# Patient Record
Sex: FEMALE | Race: BLACK OR AFRICAN AMERICAN | NOT HISPANIC OR LATINO | Employment: FULL TIME | URBAN - METROPOLITAN AREA
[De-identification: names, ages, dates, MRNs, and addresses within clinical notes are randomized per-mention and may not be internally consistent; named-entity substitution may affect disease eponyms.]

---

## 2018-04-16 ENCOUNTER — OFFICE VISIT (OUTPATIENT)
Dept: ALLERGY | Facility: CLINIC | Age: 43
End: 2018-04-16
Payer: COMMERCIAL

## 2018-04-16 VITALS
HEIGHT: 64 IN | BODY MASS INDEX: 29.5 KG/M2 | DIASTOLIC BLOOD PRESSURE: 74 MMHG | SYSTOLIC BLOOD PRESSURE: 110 MMHG | WEIGHT: 172.81 LBS

## 2018-04-16 DIAGNOSIS — J31.0 RHINITIS, UNSPECIFIED CHRONICITY, UNSPECIFIED TYPE: Primary | ICD-10-CM

## 2018-04-16 DIAGNOSIS — Z91.018 FOOD ALLERGY: ICD-10-CM

## 2018-04-16 PROCEDURE — 99244 OFF/OP CNSLTJ NEW/EST MOD 40: CPT | Mod: 25,S$GLB,, | Performed by: STUDENT IN AN ORGANIZED HEALTH CARE EDUCATION/TRAINING PROGRAM

## 2018-04-16 PROCEDURE — 99999 PR PBB SHADOW E&M-NEW PATIENT-LVL III: CPT | Mod: PBBFAC,,, | Performed by: STUDENT IN AN ORGANIZED HEALTH CARE EDUCATION/TRAINING PROGRAM

## 2018-04-16 PROCEDURE — 95004 PERQ TESTS W/ALRGNC XTRCS: CPT | Mod: S$GLB,,, | Performed by: STUDENT IN AN ORGANIZED HEALTH CARE EDUCATION/TRAINING PROGRAM

## 2018-04-16 RX ORDER — EPINEPHRINE 0.3 MG/.3ML
1 INJECTION SUBCUTANEOUS ONCE AS NEEDED
Qty: 1 ML | Refills: 0 | Status: SHIPPED | OUTPATIENT
Start: 2018-04-16 | End: 2018-05-10

## 2018-04-16 RX ORDER — AZELASTINE 1 MG/ML
2 SPRAY, METERED NASAL 2 TIMES DAILY PRN
Qty: 30 ML | Refills: 11 | Status: SHIPPED | OUTPATIENT
Start: 2018-04-16 | End: 2023-07-17

## 2018-04-16 NOTE — LETTER
April 16, 2018      Bernarda Alvarado, MICAH  [address]  [facility] [address]           [facility] - Allergy/ Immunology  [address]  [facility] [address]  Phone: [phone]  Fax: [phone]          Patient: Sara Franco   MR Number: [id number]   YOB: 1975   Date of Visit: 4/16/2018       Dear Bernarda Alvarado:    Thank you for referring Sara Franco to me for evaluation. Attached you will find relevant portions of my assessment and plan of care.    If you have questions, please do not hesitate to call me. I look forward to following Sara Franco along with you.    Sincerely,    Idania Philip MD    Enclosure  CC:  No Recipients    If you would like to receive this communication electronically, please contact [email or web address] or [phone] to request more information on [employer] Link access.    For providers and/or their staff who would like to refer a patient to [facility], please contact us through our one-stop-shop provider referral line, [facility], at [phone].    If you feel you have received this communication in error or would no longer like to receive these types of communications, please e-mail [email or web address]

## 2018-04-16 NOTE — PATIENT INSTRUCTIONS
Testing  Blood work for tilapia, catfish and a few other allergens today       Check portal in one week for results       Contact me with questions or concerns          Food allergies  Highly allergic to fish  Also showed a positive test to oyster  And positive to Hazelnut. -- which is probably a cross reaction from a pollen    Continue to avoid all fish, including canned tuna in oil.  Recommend an EpiPen    OK to eat hazelnut flavored coffees and candies.  Avoid eating tree nuts for now.          Airborne allergies  Recommend Astelin as needed before or during symptoms  2 squirts each nostril, up to twice a day        Do not snort (because it tastes bad)      Recommend these dust avoidance measures:  No stuffed animals on the bed at night.  No feathers or down on the bed  Dust proof covers on all pillows that stay on the bed at night.  Take a pillow cover (or your pillow) for vacations  Dust proof cover on mattress.    Recommend for pollen allergy  Do not mow grass  Take Zyrtec and/or Benadryl before outdoor activities    Return in 1-3 weeks  Call sooner if questions or problems

## 2018-05-08 NOTE — PROGRESS NOTES
Allergy Clinic Note  [facility]    Subjective:      Patient ID: Sara Franco is a 43 y.o. female.    Chief Complaint: Follow-up    Allergy problem list  Perennial AR:  Grass, dust > others  Fish allergy m/b mouth itching  Itching +/- rash  Positive skin tests and Immunocap to tree nuts and peanut    History of Present Illness: 43-year-old female with allergic rhinitis (grass, dust, ? others) and fish ALLERGY returns to follow-up symptoms, to follow-up test results, and to devise a longterm treatment plan.    Skin testing at last visit was complicated by overlapping flares, hence we decided to check serum specific IgE results for deciding on a treatment plan.    At last visit, I added Astelin (2 SEN BID), instructed her to avoid all fin fish, and prescribed an EpiPen. I also instructed her on dust and grass avoidance measures.    She reports improvement in all symptoms, particularly decreased runny nose.  The Zyrtec does cause drowsiness so she has altered the scheduled to 1 tablet every other night.  No adverse reactions to the Astelin.  Overall she is satisfied with her symptom level of control, but is interested in exploring options for sublingual immunotherapy to dust mite.    She was unable to fill EpiPen secondary to cost    No new problems or complaints    Additional History:   She is a lifetime nonsmoker and has no exposure to passive smoke. Past medical, family, and social histories are unchanged.    There is no problem list on file for this patient.    Current Outpatient Prescriptions on File Prior to Visit   Medication Sig Dispense Refill    azelastine (ASTELIN) 137 mcg (0.1 %) nasal spray 2 sprays (274 mcg total) by Nasal route 2 (two) times daily as needed for Rhinitis. Donot snort (because it tastes bad) 30 mL 11    [DISCONTINUED] EPINEPHrine (EPIPEN) 0.3 mg/0.3 mL AtIn Inject 0.3 mLs (0.3 mg total) into the muscle once as needed. 1 mL 0     No current facility-administered medications  "on file prior to visit.          Review of Systems   Constitutional: Negative for chills and fever.   HENT: Negative for ear discharge and nosebleeds.    Eyes: Negative for discharge and redness.   Respiratory: Negative for hemoptysis, sputum production and stridor.    Gastrointestinal: Negative for blood in stool, melena and vomiting.   Genitourinary: Negative for hematuria.   Skin: Negative for itching and rash.   Neurological: Negative for seizures and loss of consciousness.       Objective:   /76 (BP Location: Left arm, Patient Position: Sitting)   Ht 5' 4" (1.626 m)   Wt 78.4 kg (172 lb 13.5 oz)   BMI 29.67 kg/m²       Physical Exam   Constitutional: She is oriented to person, place, and time and well-developed, well-nourished, and in no distress.   HENT:   Head: Normocephalic and atraumatic.   Nose: Nose normal.   Mouth/Throat: No oropharyngeal exudate.   Nares pink with mild to moderate turbinate swelling.  No erosions   Eyes: Conjunctivae are normal. No scleral icterus.   Neck: Neck supple.   Cardiovascular: Normal rate, regular rhythm and intact distal pulses.    Pulmonary/Chest: Effort normal. No stridor. No respiratory distress.   Abdominal: She exhibits no distension.   Musculoskeletal: She exhibits no edema or deformity.   Neurological: She is alert and oriented to person, place, and time.   Skin: No rash noted. No erythema.   Psychiatric: Affect and judgment normal.       Data: aeroallergen testing by the serum Immunocap method  Class VI  Mahendra grass  Class V   Bermuda grass  Class IV   dust mite (DF)  Class III    dust mite (DP), English plantain A, oak, marsh elder, ragweed  Class II Cedar, pecan pollen, cockroach, Aspergillus mold  Class I dog  Class 0 cat, Alternaria mold  Total serum IgE 1475    Selected food aeroallergen testing by the serum Immunocap method  Class IV  catfish, codfish, tilapia, trout  Class III   peanut, tuna, salmon, flounder  Class II    hazelnut, almond, [place] " nut, cashew, walnut, oyster  Class 0   pecan  Assessment:     1. Seasonal allergic rhinitis due to pollen    2. Allergic rhinitis due to house dust mite    3. Elevated IgE level    4. Fish allergy    5. Abnormal laboratory test result        Plan:     Diagnoses and all orders for this visit:    Seasonal allergic rhinitis due to pollen - improved  Continue current meds    Allergic rhinitis due to house dust mite - improved  Request placed for Odactra  Request placed for AUVI Q    Elevated IgE level - noted    Fish allergy - confirmed by skin testing and serum  Avoid all fish except canned tuna in water    Abnormal laboratory test result  Low level reactivity to nuts appears to be false positive  This is not surprising given her total IgE of over 1400  No restrictions on tree nuts or peanuts        Patient Instructions   Avoid all fin fish        except canned tuna in water    No other food restrictions    I submitted a request for epinephrine injector.  Expect a call from an 274 area code.    I also submitted a request for the dust tablets taken under the tongue.  If approved, we can discuss further.  First dose would be given here.          Follow-up X if symptoms worsen or fail to improve,   or first Odactra dose.  or in about a year      Idania Philip MD

## 2018-05-10 ENCOUNTER — OFFICE VISIT (OUTPATIENT)
Dept: ALLERGY | Facility: CLINIC | Age: 43
End: 2018-05-10
Payer: COMMERCIAL

## 2018-05-10 VITALS
BODY MASS INDEX: 29.5 KG/M2 | DIASTOLIC BLOOD PRESSURE: 76 MMHG | HEIGHT: 64 IN | SYSTOLIC BLOOD PRESSURE: 118 MMHG | WEIGHT: 172.81 LBS

## 2018-05-10 DIAGNOSIS — R89.9 ABNORMAL LABORATORY TEST RESULT: ICD-10-CM

## 2018-05-10 DIAGNOSIS — J30.89 ALLERGIC RHINITIS DUE TO HOUSE DUST MITE: ICD-10-CM

## 2018-05-10 DIAGNOSIS — J30.1 SEASONAL ALLERGIC RHINITIS DUE TO POLLEN: Primary | ICD-10-CM

## 2018-05-10 DIAGNOSIS — R76.8 ELEVATED IGE LEVEL: ICD-10-CM

## 2018-05-10 DIAGNOSIS — Z91.013 FISH ALLERGY: ICD-10-CM

## 2018-05-10 PROCEDURE — 3008F BODY MASS INDEX DOCD: CPT | Mod: CPTII,S$GLB,, | Performed by: STUDENT IN AN ORGANIZED HEALTH CARE EDUCATION/TRAINING PROGRAM

## 2018-05-10 PROCEDURE — 99999 PR PBB SHADOW E&M-EST. PATIENT-LVL III: CPT | Mod: PBBFAC,,, | Performed by: STUDENT IN AN ORGANIZED HEALTH CARE EDUCATION/TRAINING PROGRAM

## 2018-05-10 PROCEDURE — 99213 OFFICE O/P EST LOW 20 MIN: CPT | Mod: S$GLB,,, | Performed by: STUDENT IN AN ORGANIZED HEALTH CARE EDUCATION/TRAINING PROGRAM

## 2018-05-10 RX ORDER — BIOTIN 1 MG
1000 TABLET ORAL 3 TIMES DAILY
COMMUNITY
End: 2023-12-04 | Stop reason: CLARIF

## 2018-05-10 RX ORDER — CETIRIZINE HYDROCHLORIDE 10 MG/1
10 TABLET ORAL DAILY
COMMUNITY

## 2018-05-10 RX ORDER — EPINEPHRINE 0.3 MG/.3ML
1 INJECTION SUBCUTANEOUS ONCE
Qty: 0.3 ML | Refills: 3 | Status: SHIPPED | OUTPATIENT
Start: 2018-05-10 | End: 2018-05-10

## 2018-05-10 NOTE — PATIENT INSTRUCTIONS
Avoid all fin fish        except canned tuna in water    No other food restrictions    I submitted a request for epinephrine injector.  Expect a call from an [id number] area code.    I also submitted a request for the dust tablets taken under the tongue.  If approved, we can discuss further.  First dose would be given here.

## 2019-07-18 ENCOUNTER — HOSPITAL ENCOUNTER (OUTPATIENT)
Dept: RADIOLOGY | Facility: OTHER | Age: 44
Discharge: HOME | End: 2019-07-18
Attending: NURSE PRACTITIONER
Payer: COMMERCIAL

## 2019-07-18 DIAGNOSIS — R10.2 PELVIC AND PERINEAL PAIN: ICD-10-CM

## 2019-07-18 PROCEDURE — 76830 TRANSVAGINAL US NON-OB: CPT | Mod: 26,,, | Performed by: RADIOLOGY

## 2019-07-18 PROCEDURE — 76856 US EXAM PELVIC COMPLETE: CPT | Mod: 26,,, | Performed by: RADIOLOGY

## 2019-07-18 PROCEDURE — 76830 TRANSVAGINAL US NON-OB: CPT | Mod: TC

## 2019-07-18 PROCEDURE — 76830 US PELVIS COMP WITH TRANSVAG NON-OB (XPD): ICD-10-PCS | Mod: 26,,, | Performed by: RADIOLOGY

## 2019-07-18 PROCEDURE — 76856 US PELVIS COMP WITH TRANSVAG NON-OB (XPD): ICD-10-PCS | Mod: 26,,, | Performed by: RADIOLOGY

## 2019-08-05 ENCOUNTER — HOSPITAL ENCOUNTER (OUTPATIENT)
Dept: RADIOLOGY | Facility: HOSPITAL | Age: 44
Discharge: HOME | End: 2019-08-05
Attending: NURSE PRACTITIONER
Payer: COMMERCIAL

## 2019-08-05 VITALS — BODY MASS INDEX: 29.52 KG/M2 | WEIGHT: 172 LBS

## 2019-08-05 DIAGNOSIS — Z12.31 ENCOUNTER FOR SCREENING MAMMOGRAM FOR MALIGNANT NEOPLASM OF BREAST: ICD-10-CM

## 2019-08-05 PROCEDURE — 77067 SCR MAMMO BI INCL CAD: CPT | Mod: TC

## 2019-08-05 PROCEDURE — 77067 SCR MAMMO BI INCL CAD: CPT | Mod: 26,,, | Performed by: RADIOLOGY

## 2019-08-05 PROCEDURE — 77067 MAMMO DIGITAL SCREENING BILAT WITH CAD: ICD-10-PCS | Mod: 26,,, | Performed by: RADIOLOGY

## 2019-08-29 ENCOUNTER — OFFICE VISIT (OUTPATIENT)
Dept: OBSTETRICS AND GYNECOLOGY | Facility: CLINIC | Age: 44
End: 2019-08-29
Payer: COMMERCIAL

## 2019-08-29 VITALS
BODY MASS INDEX: 28.81 KG/M2 | DIASTOLIC BLOOD PRESSURE: 66 MMHG | WEIGHT: 168.75 LBS | HEIGHT: 64 IN | SYSTOLIC BLOOD PRESSURE: 102 MMHG

## 2019-08-29 DIAGNOSIS — D21.9 LEIOMYOMA: Primary | ICD-10-CM

## 2019-08-29 PROCEDURE — 3008F BODY MASS INDEX DOCD: CPT | Mod: CPTII,S$GLB,, | Performed by: OBSTETRICS & GYNECOLOGY

## 2019-08-29 PROCEDURE — 3008F PR BODY MASS INDEX (BMI) DOCUMENTED: ICD-10-PCS | Mod: CPTII,S$GLB,, | Performed by: OBSTETRICS & GYNECOLOGY

## 2019-08-29 PROCEDURE — 99203 PR OFFICE/OUTPT VISIT, NEW, LEVL III, 30-44 MIN: ICD-10-PCS | Mod: S$GLB,,, | Performed by: OBSTETRICS & GYNECOLOGY

## 2019-08-29 PROCEDURE — 99999 PR PBB SHADOW E&M-EST. PATIENT-LVL III: ICD-10-PCS | Mod: PBBFAC,,, | Performed by: OBSTETRICS & GYNECOLOGY

## 2019-08-29 PROCEDURE — 99999 PR PBB SHADOW E&M-EST. PATIENT-LVL III: CPT | Mod: PBBFAC,,, | Performed by: OBSTETRICS & GYNECOLOGY

## 2019-08-29 PROCEDURE — 99203 OFFICE O/P NEW LOW 30 MIN: CPT | Mod: S$GLB,,, | Performed by: OBSTETRICS & GYNECOLOGY

## 2019-08-29 NOTE — PROGRESS NOTES
History & Physical  Gynecology      SUBJECTIVE:     Chief Complaint: Ovarian Cyst and Fibroids       History of Present Illness:  Ms. Franco is a 44 yr old female who presents for pelvic and perineal pain. She underwent an ultrasound which showed multiple small fibroids (largest 1.8cm) and a benign left adnexal cyst. Today she denies constant pelvic pain. She reports regular cycles with 5 days of bleeding and cramping. Currently sexually active with pain with different positions but overall tolerable. No other complaints.     FINDINGS:  Uterus:    Size: 9.3 x 5.1 x 5.2 cm    Masses: 3 fibroids are identified.  There is a 1.5 cm fibroid in an anterior subserosal location.  1.8 cm fibroid in the posterior uterus, intramural.  8 mm fibroid in the posterior subserosal uterus.  Endometrium: Normal in this pre menopausal patient, measuring 5 mm.  Right ovary:  Size: 2.3 x 1.5 x 2.2 cm  Appearance: Normal  Vascular flow: Normal.  Left ovary:  Size: 4.1 x 2.3 x 4.0 cm  Appearance: Benign appearing cyst.  Vascular Flow: Normal.  Free Fluid:  None.    Review of patient's allergies indicates:  No Known Allergies    Past Medical History:   Diagnosis Date    Allergy      Past Surgical History:   Procedure Laterality Date    TUBAL LIGATION       OB History        3    Para   3    Term   3            AB        Living           SAB        TAB        Ectopic        Multiple        Live Births                   Family History   Problem Relation Age of Onset    Eczema Daughter     Allergic rhinitis Daughter     Breast cancer Maternal Aunt     Colon cancer Neg Hx     Ovarian cancer Neg Hx      Social History     Tobacco Use    Smoking status: Never Smoker    Smokeless tobacco: Never Used   Substance Use Topics    Alcohol use: Yes     Comment: socially     Drug use: No       Current Outpatient Medications   Medication Sig    allerg xt,D.farinae-D.pteronys (ODACTRA) 12 SQ-HDM Subl Place 1 tablet under the tongue  once daily.    azelastine (ASTELIN) 137 mcg (0.1 %) nasal spray 2 sprays (274 mcg total) by Nasal route 2 (two) times daily as needed for Rhinitis. Donot snort (because it tastes bad)    biotin 1 mg tablet Take 1,000 mcg by mouth 3 (three) times daily.    cetirizine (ZYRTEC) 10 MG tablet Take 10 mg by mouth once daily.    EPINEPHrine (AUVI-Q) 0.3 mg/0.3 mL AtIn Inject 0.3 mLs (0.3 mg total) into the muscle once.    etodolac (LODINE) 400 MG tablet Take 1 tablet (400 mg total) by mouth 2 (two) times daily as needed (Pain). Take with food     No current facility-administered medications for this visit.          Review of Systems:  Review of Systems   Constitutional: Negative for activity change, fatigue, fever and unexpected weight change.   Respiratory: Negative for cough and shortness of breath.    Cardiovascular: Negative for chest pain and palpitations.   Gastrointestinal: Negative for abdominal pain, constipation, diarrhea and nausea.   Endocrine: Negative for hot flashes.   Genitourinary: Positive for pelvic pain. Negative for dyspareunia, dysuria, menorrhagia, menstrual problem and vaginal discharge.   Musculoskeletal: Negative for back pain.   Integumentary:  Negative for nipple discharge.   Neurological: Negative for headaches.   Psychiatric/Behavioral: The patient is not nervous/anxious.    Breast: Negative for nipple discharge       OBJECTIVE:     Physical Exam:  Physical Exam   Constitutional: She is oriented to person, place, and time. She appears well-developed and well-nourished.   HENT:   Head: Normocephalic and atraumatic.   Neck: Normal range of motion. Neck supple.   Cardiovascular: Normal rate, regular rhythm, normal heart sounds and intact distal pulses.   Pulmonary/Chest: Effort normal and breath sounds normal.   Abdominal: Soft. Bowel sounds are normal. There is no tenderness. There is no guarding.   Genitourinary: There is no rash, tenderness, lesion or injury on the right labia. There is  no rash, tenderness, lesion or injury on the left labia. Uterus is not deviated, not enlarged, not fixed and not tender. Cervix exhibits no motion tenderness, no discharge and no friability. Right adnexum displays no mass, no tenderness and no fullness. Left adnexum displays no mass, no tenderness and no fullness. No erythema, tenderness or bleeding in the vagina. No foreign body in the vagina. No signs of injury around the vagina. No vaginal discharge found.   Genitourinary Comments: Posterior uterine fibroid   Neurological: She is alert and oriented to person, place, and time.   Skin: Skin is warm and dry.   Psychiatric: She has a normal mood and affect.   Vitals reviewed.        ASSESSMENT:       ICD-10-CM ICD-9-CM    1. Leiomyoma D21.9 215.9           Plan:      Discussed treatment for uterine fibroids including expectant management, medical therapy and surgery  Currently without heavy bleeding and pain only with cycles. Will proceed with expectant management at this time  RTC PRN    Counseling time: 15 minutes    Agustin Rogers

## 2019-08-29 NOTE — LETTER
August 29, 2019      Taty Cat, NP  [address] [facility] [address]           [facility]  [address], [facility] [address]  [facility] [address]  Phone: [phone]  Fax: [phone]          Patient: Sara Franco   MR Number: [id number]   YOB: 1975   Date of Visit: 8/29/2019       Dear Taty Cat:    Thank you for referring Sara Franco to me for evaluation. Attached you will find relevant portions of my assessment and plan of care.    If you have questions, please do not hesitate to call me. I look forward to following Sara Franco along with you.    Sincerely,    Agustin Rogers MD    Enclosure  CC:  No Recipients    If you would like to receive this communication electronically, please contact [email or web address] or [phone] to request more information on [employer] Link access.    For providers and/or their staff who would like to refer a patient to [facility], please contact us through our one-stop-shop provider referral line, [facility], at [phone].    If you feel you have received this communication in error or would no longer like to receive these types of communications, please e-mail [email or web address]

## 2020-08-17 ENCOUNTER — HOSPITAL ENCOUNTER (OUTPATIENT)
Dept: RADIOLOGY | Facility: OTHER | Age: 45
Discharge: HOME | End: 2020-08-17
Attending: NURSE PRACTITIONER
Payer: COMMERCIAL

## 2020-08-17 DIAGNOSIS — M25.561 PAIN IN RIGHT KNEE: ICD-10-CM

## 2020-08-17 PROCEDURE — 73562 X-RAY EXAM OF KNEE 3: CPT | Mod: TC,FY,RT

## 2020-08-17 PROCEDURE — 73562 XR KNEE 3 VIEW RIGHT: ICD-10-PCS | Mod: 26,RT,, | Performed by: RADIOLOGY

## 2020-08-17 PROCEDURE — 73562 X-RAY EXAM OF KNEE 3: CPT | Mod: 26,RT,, | Performed by: RADIOLOGY

## 2021-04-28 ENCOUNTER — PATIENT MESSAGE (OUTPATIENT)
Dept: RESEARCH | Facility: HOSPITAL | Age: 46
End: 2021-04-28

## 2021-09-30 ENCOUNTER — HOSPITAL ENCOUNTER (OUTPATIENT)
Dept: RADIOLOGY | Facility: OTHER | Age: 46
Discharge: HOME | End: 2021-09-30
Attending: NURSE PRACTITIONER
Payer: COMMERCIAL

## 2021-09-30 DIAGNOSIS — Z12.31 ENCOUNTER FOR SCREENING MAMMOGRAM FOR MALIGNANT NEOPLASM OF BREAST: ICD-10-CM

## 2021-09-30 DIAGNOSIS — D25.9 LEIOMYOMA OF UTERUS, UNSPECIFIED: ICD-10-CM

## 2021-09-30 PROCEDURE — 76856 US EXAM PELVIC COMPLETE: CPT | Mod: 26,,, | Performed by: RADIOLOGY

## 2021-09-30 PROCEDURE — 77067 SCR MAMMO BI INCL CAD: CPT | Mod: TC

## 2021-09-30 PROCEDURE — 76856 US EXAM PELVIC COMPLETE: CPT | Mod: TC

## 2021-09-30 PROCEDURE — 77067 MAMMO DIGITAL SCREENING BILAT WITH TOMO: ICD-10-PCS | Mod: 26,,, | Performed by: RADIOLOGY

## 2021-09-30 PROCEDURE — 77063 MAMMO DIGITAL SCREENING BILAT WITH TOMO: ICD-10-PCS | Mod: 26,,, | Performed by: RADIOLOGY

## 2021-09-30 PROCEDURE — 76830 TRANSVAGINAL US NON-OB: CPT | Mod: 26,,, | Performed by: RADIOLOGY

## 2021-09-30 PROCEDURE — 76830 US PELVIS COMP WITH TRANSVAG NON-OB (XPD): ICD-10-PCS | Mod: 26,,, | Performed by: RADIOLOGY

## 2021-09-30 PROCEDURE — 77067 SCR MAMMO BI INCL CAD: CPT | Mod: 26,,, | Performed by: RADIOLOGY

## 2021-09-30 PROCEDURE — 77063 BREAST TOMOSYNTHESIS BI: CPT | Mod: 26,,, | Performed by: RADIOLOGY

## 2021-09-30 PROCEDURE — 76856 US PELVIS COMP WITH TRANSVAG NON-OB (XPD): ICD-10-PCS | Mod: 26,,, | Performed by: RADIOLOGY

## 2021-10-22 ENCOUNTER — OFFICE VISIT (OUTPATIENT)
Dept: OBSTETRICS AND GYNECOLOGY | Facility: CLINIC | Age: 46
End: 2021-10-22
Payer: COMMERCIAL

## 2021-10-22 VITALS
DIASTOLIC BLOOD PRESSURE: 70 MMHG | SYSTOLIC BLOOD PRESSURE: 130 MMHG | WEIGHT: 177.38 LBS | BODY MASS INDEX: 30.44 KG/M2

## 2021-10-22 DIAGNOSIS — D25.1 FIBROIDS, INTRAMURAL: Primary | ICD-10-CM

## 2021-10-22 PROCEDURE — 3075F SYST BP GE 130 - 139MM HG: CPT | Mod: CPTII,S$GLB,, | Performed by: OBSTETRICS & GYNECOLOGY

## 2021-10-22 PROCEDURE — 3078F PR MOST RECENT DIASTOLIC BLOOD PRESSURE < 80 MM HG: ICD-10-PCS | Mod: CPTII,S$GLB,, | Performed by: OBSTETRICS & GYNECOLOGY

## 2021-10-22 PROCEDURE — 3008F BODY MASS INDEX DOCD: CPT | Mod: CPTII,S$GLB,, | Performed by: OBSTETRICS & GYNECOLOGY

## 2021-10-22 PROCEDURE — 99999 PR PBB SHADOW E&M-EST. PATIENT-LVL III: CPT | Mod: PBBFAC,,, | Performed by: OBSTETRICS & GYNECOLOGY

## 2021-10-22 PROCEDURE — 3078F DIAST BP <80 MM HG: CPT | Mod: CPTII,S$GLB,, | Performed by: OBSTETRICS & GYNECOLOGY

## 2021-10-22 PROCEDURE — 3008F PR BODY MASS INDEX (BMI) DOCUMENTED: ICD-10-PCS | Mod: CPTII,S$GLB,, | Performed by: OBSTETRICS & GYNECOLOGY

## 2021-10-22 PROCEDURE — 99999 PR PBB SHADOW E&M-EST. PATIENT-LVL III: ICD-10-PCS | Mod: PBBFAC,,, | Performed by: OBSTETRICS & GYNECOLOGY

## 2021-10-22 PROCEDURE — 99213 PR OFFICE/OUTPT VISIT, EST, LEVL III, 20-29 MIN: ICD-10-PCS | Mod: S$GLB,,, | Performed by: OBSTETRICS & GYNECOLOGY

## 2021-10-22 PROCEDURE — 1159F PR MEDICATION LIST DOCUMENTED IN MEDICAL RECORD: ICD-10-PCS | Mod: CPTII,S$GLB,, | Performed by: OBSTETRICS & GYNECOLOGY

## 2021-10-22 PROCEDURE — 1160F PR REVIEW ALL MEDS BY PRESCRIBER/CLIN PHARMACIST DOCUMENTED: ICD-10-PCS | Mod: CPTII,S$GLB,, | Performed by: OBSTETRICS & GYNECOLOGY

## 2021-10-22 PROCEDURE — 1160F RVW MEDS BY RX/DR IN RCRD: CPT | Mod: CPTII,S$GLB,, | Performed by: OBSTETRICS & GYNECOLOGY

## 2021-10-22 PROCEDURE — 3075F PR MOST RECENT SYSTOLIC BLOOD PRESS GE 130-139MM HG: ICD-10-PCS | Mod: CPTII,S$GLB,, | Performed by: OBSTETRICS & GYNECOLOGY

## 2021-10-22 PROCEDURE — 1159F MED LIST DOCD IN RCRD: CPT | Mod: CPTII,S$GLB,, | Performed by: OBSTETRICS & GYNECOLOGY

## 2021-10-22 PROCEDURE — 99213 OFFICE O/P EST LOW 20 MIN: CPT | Mod: S$GLB,,, | Performed by: OBSTETRICS & GYNECOLOGY

## 2021-10-22 RX ORDER — FLUTICASONE PROPIONATE 50 MCG
SPRAY, SUSPENSION (ML) NASAL
COMMUNITY
End: 2023-07-17

## 2021-10-22 RX ORDER — ONDANSETRON 4 MG/1
TABLET, ORALLY DISINTEGRATING ORAL
COMMUNITY
End: 2023-07-17

## 2021-10-22 RX ORDER — IBUPROFEN 800 MG/1
TABLET ORAL
COMMUNITY
End: 2022-04-22

## 2022-05-18 ENCOUNTER — PATIENT MESSAGE (OUTPATIENT)
Dept: UROLOGY | Facility: CLINIC | Age: 47
End: 2022-05-18
Payer: COMMERCIAL

## 2022-05-18 ENCOUNTER — TELEPHONE (OUTPATIENT)
Dept: UROLOGY | Facility: CLINIC | Age: 47
End: 2022-05-18
Payer: COMMERCIAL

## 2022-06-08 ENCOUNTER — TELEPHONE (OUTPATIENT)
Dept: UROLOGY | Facility: CLINIC | Age: 47
End: 2022-06-08
Payer: COMMERCIAL

## 2022-06-16 ENCOUNTER — OFFICE VISIT (OUTPATIENT)
Dept: UROLOGY | Facility: CLINIC | Age: 47
End: 2022-06-16
Payer: COMMERCIAL

## 2022-06-16 VITALS — HEART RATE: 62 BPM | SYSTOLIC BLOOD PRESSURE: 134 MMHG | DIASTOLIC BLOOD PRESSURE: 67 MMHG

## 2022-06-16 DIAGNOSIS — R31.0 GROSS HEMATURIA: Primary | ICD-10-CM

## 2022-06-16 DIAGNOSIS — N39.0 FREQUENT UTI: ICD-10-CM

## 2022-06-16 LAB
BILIRUB SERPL-MCNC: NEGATIVE MG/DL
BLOOD URINE, POC: NEGATIVE
CLARITY, POC UA: CLEAR
COLOR, POC UA: YELLOW
GLUCOSE UR QL STRIP: NORMAL
KETONES UR QL STRIP: NEGATIVE
LEUKOCYTE ESTERASE URINE, POC: NEGATIVE
MICROSCOPIC COMMENT: NORMAL
NITRITE, POC UA: NEGATIVE
PH, POC UA: 5
POC RESIDUAL URINE VOLUME: 0 ML (ref 0–100)
PROTEIN, POC: NEGATIVE
RBC #/AREA URNS AUTO: 1 /HPF (ref 0–4)
SPECIFIC GRAVITY, POC UA: 1.03
SQUAMOUS #/AREA URNS AUTO: 4 /HPF
UROBILINOGEN, POC UA: NORMAL
WBC #/AREA URNS AUTO: 1 /HPF (ref 0–5)

## 2022-06-16 PROCEDURE — 51798 US URINE CAPACITY MEASURE: CPT | Mod: S$GLB,,, | Performed by: NURSE PRACTITIONER

## 2022-06-16 PROCEDURE — 1160F RVW MEDS BY RX/DR IN RCRD: CPT | Mod: CPTII,S$GLB,, | Performed by: NURSE PRACTITIONER

## 2022-06-16 PROCEDURE — 99203 PR OFFICE/OUTPT VISIT, NEW, LEVL III, 30-44 MIN: ICD-10-PCS | Mod: S$GLB,,, | Performed by: NURSE PRACTITIONER

## 2022-06-16 PROCEDURE — 51798 POCT BLADDER SCAN: ICD-10-PCS | Mod: S$GLB,,, | Performed by: NURSE PRACTITIONER

## 2022-06-16 PROCEDURE — 1159F PR MEDICATION LIST DOCUMENTED IN MEDICAL RECORD: ICD-10-PCS | Mod: CPTII,S$GLB,, | Performed by: NURSE PRACTITIONER

## 2022-06-16 PROCEDURE — 1159F MED LIST DOCD IN RCRD: CPT | Mod: CPTII,S$GLB,, | Performed by: NURSE PRACTITIONER

## 2022-06-16 PROCEDURE — 3075F PR MOST RECENT SYSTOLIC BLOOD PRESS GE 130-139MM HG: ICD-10-PCS | Mod: CPTII,S$GLB,, | Performed by: NURSE PRACTITIONER

## 2022-06-16 PROCEDURE — 3078F PR MOST RECENT DIASTOLIC BLOOD PRESSURE < 80 MM HG: ICD-10-PCS | Mod: CPTII,S$GLB,, | Performed by: NURSE PRACTITIONER

## 2022-06-16 PROCEDURE — 87086 URINE CULTURE/COLONY COUNT: CPT | Performed by: NURSE PRACTITIONER

## 2022-06-16 PROCEDURE — 81002 URINALYSIS NONAUTO W/O SCOPE: CPT | Mod: S$GLB,,, | Performed by: NURSE PRACTITIONER

## 2022-06-16 PROCEDURE — 3075F SYST BP GE 130 - 139MM HG: CPT | Mod: CPTII,S$GLB,, | Performed by: NURSE PRACTITIONER

## 2022-06-16 PROCEDURE — 99203 OFFICE O/P NEW LOW 30 MIN: CPT | Mod: S$GLB,,, | Performed by: NURSE PRACTITIONER

## 2022-06-16 PROCEDURE — 3078F DIAST BP <80 MM HG: CPT | Mod: CPTII,S$GLB,, | Performed by: NURSE PRACTITIONER

## 2022-06-16 PROCEDURE — 1160F PR REVIEW ALL MEDS BY PRESCRIBER/CLIN PHARMACIST DOCUMENTED: ICD-10-PCS | Mod: CPTII,S$GLB,, | Performed by: NURSE PRACTITIONER

## 2022-06-16 PROCEDURE — 81002 POCT URINE DIPSTICK WITHOUT MICROSCOPE: ICD-10-PCS | Mod: S$GLB,,, | Performed by: NURSE PRACTITIONER

## 2022-06-16 PROCEDURE — 81001 URINALYSIS AUTO W/SCOPE: CPT | Performed by: NURSE PRACTITIONER

## 2022-06-16 NOTE — PROGRESS NOTES
Subjective:      Sara Franco is a 47 y.o. female who was self-referred for evaluation of her hematuria.      Treated for a UTI in the ED in April with gross hematuria, dysuria, frequency and urgency. UA was + for RBCs but the urine was not cultured. Nitrite +; however was taking azo. Completed rx for Cipro.    Reports complete resolution of her symptoms today. Reports several UTIs/year. Distant history of pyelonephritis. Denies a history of nephrolithiasis.     Component      Latest Ref Rng & Units 4/22/2022   RBC, UA      0 - 4 /hpf 50 (H)   WBC, UA      0 - 5 /hpf 4   Bacteria, UA      None-Occ /hpf Rare   Squam Epithel, UA      /hpf 3   Hyaline Casts, UA      0-1/lpf /lpf 0   Microscopic Comment       SEE COMMENT     The following portions of the patient's history were reviewed and updated as appropriate: allergies, current medications, past family history, past medical history, past social history, past surgical history and problem list.    Review of Systems  Constitutional: no fever or chills  ENT: no nasal congestion or sore throat  Respiratory: no cough or shortness of breath  Cardiovascular: no chest pain or palpitations  Gastrointestinal: no nausea or vomiting, tolerating diet  Genitourinary: as per HPI  Hematologic/Lymphatic: no easy bruising or lymphadenopathy  Musculoskeletal: no arthralgias or myalgias  Neurological: no seizures or tremors  Behavioral/Psych: no auditory or visual hallucinations     Objective:   Vital Signs:   Vitals:    06/16/22 1148   BP: 134/67   Pulse: 62     Physical Exam   General: alert and oriented, no acute distress  Head: normocephalic, atraumatic  Neck: supple, normal ROM  Respiratory: Symmetric expansion, non-labored breathing  Cardiovascular: regular rate and rhythm  Abdomen: soft, non tender, non distended  Pelvic: deferred  Skin: normal coloration and turgor, no rashes, no suspicious skin lesions noted  Neuro: alert and oriented x3, no gross deficits  Psych: normal  judgment and insight, normal mood/affect and non-anxious    Lab Review   Urinalysis demonstrates negative for all components  PVR: 0 mL  No results found for: WBC, HGB, HCT, MCV, PLT  No results found for: CREATININE, BUN    Imaging   None    Assessment:   Gross hematuria  Frequent UTI    Plan:   Sara was seen today for dysuria, urinary tract infection and hematuria.    Diagnoses and all orders for this visit:    Gross hematuria  -     POCT URINE DIPSTICK WITHOUT MICROSCOPE  -     POCT Bladder Scan  -     Urine culture  -     Urinalysis Microscopic    Frequent UTI    Plan:  --UA neg today, sent for culture and micro UA   --Discussed the AUA guidelines for microhematuria  --Will notify with results!

## 2022-06-18 LAB
BACTERIA UR CULT: NORMAL
BACTERIA UR CULT: NORMAL

## 2022-08-30 NOTE — PROGRESS NOTES
Allergy Clinic Note  [facility]    Subjective:      Patient ID: Sara Franco is a 43 y.o. female.    Chief Complaint: Itching (face and chest) and Allergies (nasal congestion, itchy watery eyes)      Referring Provider: Bernarda Alvarado    History of Present Illness: 43-year-old female, who is new to the [facility], is referred by Dr. Alvarado for intermittent itching with or without rash x 2 months.    She describes three distinct incidents.  The first occurred while folding clothes at work.  The second occurred on Easter day while sweeping indoors.  The third occurred while walking out side.  She is helped by Benadryl.  She was seen in an outside.  She was ER and was treated with systemic steroids.    She also reports adverse reaction to fish.  She says that eating fish causes her mouth to itch.  She says the cooking fish causes her face and eyes to itch, a rash around her eyes and eyelid swelling especially at the medial canthus.  This has occurred with tilapia and catfish fresher.  She also reports some map out itching after eating canned tuna in oil, but states no problems with canned tuna in water.  She has not eaten fresh tuna.    She also reports intermittent rhinitis symptoms manifested by nasal congestion, runny nose, itchy eyes and red eyes.  She is unsure of the seasonal pattern.  She believes indoor dusts is a precipitant.  She had ALLERGY skin testing in the past which was evidently positive for right grass.  More recently she had some Immunocaps which showed several positives; however, these results are not presently available.  In retrospect, she admits to avoiding outdoor activities.    Additional History:   Past medical history is unremarkable.  No Hx of ENT surgery. Family history is significant for a daughter with eczema and rhinoconjunctivitis as well as a mother with asthma. Client   reports that she has never smoked. She has never used smokeless tobacco.  Exposures are  "notable for children at home.  No exposure to passive smoke or unusual substances.  She is , works at [employer], and lives in [place]    There is no problem list on file for this patient.    No current outpatient prescriptions on file prior to visit.     No current facility-administered medications on file prior to visit.          Review of Systems   Constitutional: Negative for chills, fever and malaise/fatigue.   HENT: Negative for ear discharge.    Eyes: Negative for pain and discharge.   Respiratory: Negative for hemoptysis, shortness of breath, wheezing and stridor.    Cardiovascular: Negative for chest pain and palpitations.   Gastrointestinal: Negative for abdominal pain, blood in stool, nausea and vomiting.   Genitourinary: Negative for dysuria, flank pain and hematuria.   Musculoskeletal: Negative for joint pain and myalgias.   Skin: Negative for itching and rash.   Neurological: Negative for seizures and loss of consciousness.       Objective:   /74 (BP Location: Left arm, Patient Position: Sitting)   Ht 5' 4" (1.626 m)   Wt 78.4 kg (172 lb 13.5 oz)   BMI 29.67 kg/m²       Physical Exam   Constitutional: She is oriented to person, place, and time and well-developed, well-nourished, and in no distress.   HENT:   Head: Normocephalic and atraumatic.   Nose: Nose normal.   Eyes: Conjunctivae are normal. No scleral icterus.   Neck: Neck supple.   Cardiovascular: Normal rate, regular rhythm and intact distal pulses.    Pulmonary/Chest: Effort normal. No stridor. No respiratory distress.   Abdominal: She exhibits no distension.   Musculoskeletal: She exhibits no edema or deformity.   Neurological: She is alert and oriented to person, place, and time.   Skin: No rash noted. No erythema.   Psychiatric: Affect and judgment normal.       Data: Aeroallergen testing by the skin prick method strongly positive to dust mites and grasses, with lesser reactions to large number of other allergens.  Testing " complicated by dermatographism and also by a 4+ reaction to the negative control    Food aeroallergen testing by the skin prick method   Was positive to flounder, salmon and tuna. (i.e. all fin fish tested.  She was also positive to ouster, hazelnut, peanut and pecan, although these may have been spread from adjacent grass pollen.        Assessment:     1. Rhinitis, unspecified chronicity, unspecified type    2. Food allergy        Plan:     Medical Decision Making:  Pt has clear history of fish allergy and positive skin test.  Other positive food skin test are of uncertain significance.  Some may be due to food-pollen overlap syndrome (hazelnut), whereas others may reflect spread from adjacent grasses (peanut, pecan).  Will check Immunocaps before making diet recommendations.    Client's airborne skin tests correlate with her sx.  She is allergic to dust, which she expected.  Additionally, her red itchy eye Sx occur primarily outdoors, which probably reflects her grass allergy.  She is, therefore, an excellent candidate for immune-based therapies (SCIT, SLIT) as an alternative to longterm medications or if more conservative measures prove inadequate.      Sara was seen today for itching and allergies.    Diagnoses and all orders for this visit:    Rhinitis, unspecified chronicity, unspecified type  -     IgE; Future  -     Dermatophagoides [place]; Future  -     Dermatophagoides Pteronyssinus; Future  -     Bermuda; Future  -     Mahendra; Future  -     Perkins; Future  -     English Plantain; Future  -     Oak Pecan; Future  -     Pecan [place]; Future  -     Renae Elder; Future  -     Ragweed; Future  -     Alternaria; Future  -     Aspergillus; Future  -     Cat; Future  -     Cockroach; Future  -     Dog; Future  -     azelastine (ASTELIN) 137 mcg (0.1 %) nasal spray; 2 sprays (274 mcg total) by Nasal route 2 (two) times daily as needed for Rhinitis. Donot snort (because it tastes bad)        Food allergy  -      Tuna IgE; Future  -     Allergen-Catfish; Future  -     Allergen-Codfish; Future  -     Allergen-Tilapia; Future  -     Cancel: Flounder IgE; Future  -     [place] IgE; Future  -     Trout IgE; Future  -     Flounder IgE; Future  -     Oyster IgE; Future  -     Hazelnut IgE; Future  -     Almonds IgE; Future  -     Brazil nut IgE; Future  -     Cashew IgE; Future  -     Peanut IgE; Future  -     Pecan, nut; Future  -     Dexter IgE; Future  -     EPINEPHrine (EPIPEN) 0.3 mg/0.3 mL AtIn; Inject 0.3 mLs (0.3 mg total) into the muscle once as needed.      Patient Instructions   Testing  Blood work for tilapia, catfish and a few other allergens today       Check portal in one week for results       Contact me with questions or concerns          Food allergies  Highly allergic to fish  Also showed a positive test to oyster  And positive to Hazelnut. -- which is probably a cross reaction from a pollen    Continue to avoid all fish, including canned tuna in oil.  Recommend an EpiPen    OK to eat hazelnut flavored coffees and candies.  Avoid eating tree nuts for now.          Airborne allergies  Recommend Astelin as needed before or during symptoms  2 squirts each nostril, up to twice a day        Do not snort (because it tastes bad)      Recommend these dust avoidance measures:  No stuffed animals on the bed at night.  No feathers or down on the bed  Dust proof covers on all pillows that stay on the bed at night.  Take a pillow cover (or your pillow) for vacations  Dust proof cover on mattress.    Recommend for pollen allergy  Do not mow grass  Take Zyrtec and/or Benadryl before outdoor activities    Return in 1-3 weeks  Call sooner if questions or problems                    Follow-up in about 2 weeks (around 4/30/2018).    Idania Philip MD       decreased gita/increased time in double stance/decreased velocity of limb motion/decreased step length/decreased stride length

## 2023-01-19 ENCOUNTER — TELEPHONE (OUTPATIENT)
Dept: ORTHOPEDICS | Facility: CLINIC | Age: 48
End: 2023-01-19
Payer: COMMERCIAL

## 2023-01-19 NOTE — TELEPHONE ENCOUNTER
Attempted to reach pt. VM has not been set up. My O message sent requesting call back for assistance scheduling.

## 2023-01-24 DIAGNOSIS — Z12.11 SPECIAL SCREENING FOR MALIGNANT NEOPLASMS, COLON: Primary | ICD-10-CM

## 2023-01-27 ENCOUNTER — HOSPITAL ENCOUNTER (OUTPATIENT)
Dept: RADIOLOGY | Facility: OTHER | Age: 48
Discharge: HOME | End: 2023-01-27
Attending: NURSE PRACTITIONER
Payer: COMMERCIAL

## 2023-01-27 DIAGNOSIS — Z12.31 ENCOUNTER FOR SCREENING MAMMOGRAM FOR MALIGNANT NEOPLASM OF BREAST: ICD-10-CM

## 2023-01-27 PROCEDURE — 77067 SCR MAMMO BI INCL CAD: CPT | Mod: 26,,, | Performed by: RADIOLOGY

## 2023-01-27 PROCEDURE — 77067 SCR MAMMO BI INCL CAD: CPT | Mod: TC

## 2023-01-27 PROCEDURE — 77063 MAMMO DIGITAL SCREENING BILAT WITH TOMO: ICD-10-PCS | Mod: 26,,, | Performed by: RADIOLOGY

## 2023-01-27 PROCEDURE — 77067 MAMMO DIGITAL SCREENING BILAT WITH TOMO: ICD-10-PCS | Mod: 26,,, | Performed by: RADIOLOGY

## 2023-01-27 PROCEDURE — 77063 BREAST TOMOSYNTHESIS BI: CPT | Mod: 26,,, | Performed by: RADIOLOGY

## 2023-01-31 DIAGNOSIS — M79.644 PAIN OF RIGHT THUMB: Primary | ICD-10-CM

## 2023-02-20 ENCOUNTER — CLINICAL SUPPORT (OUTPATIENT)
Dept: REHABILITATION | Facility: HOSPITAL | Age: 48
End: 2023-02-20
Attending: NURSE PRACTITIONER
Payer: COMMERCIAL

## 2023-02-20 DIAGNOSIS — M79.644 PAIN OF RIGHT THUMB: ICD-10-CM

## 2023-02-20 DIAGNOSIS — M25.641 DECREASED RANGE OF MOTION OF RIGHT THUMB: ICD-10-CM

## 2023-02-20 PROCEDURE — 97165 OT EVAL LOW COMPLEX 30 MIN: CPT

## 2023-02-20 PROCEDURE — L3933 FO W/O JOINTS CF: HCPCS

## 2023-02-20 NOTE — PLAN OF CARE
[facility] Occupational Therapy  Initial Evaluation     Date: 2/20/2023  Patient: Sara Franco  Chart Number: [id number]    Medical Diagnosis: M79.644 (ICD-10-CM) - Pain of right thumb   Therapy Diagnosis:   1. Pain of right thumb  Ambulatory referral/consult to Physical/Occupational Therapy      2. Decreased range of motion of right thumb            Referring Physician: Dorota Corona NP  Physician Orders: Eval and treat    Date of Injury: couple months ago  Date of Surgery: NA  Date of Return to MD: PRN    Evaluation Date: 2/20/2023  Authorization Period: 1/31/23 - 12/30/23  Plan of Care Expiration: 5/15/23  Visit #/ Visits Authorized: 1 of 1  FOTO Completion: next visit    Time In: 9:05 am  Time Out: 9:45 am  Total Appointment Time (timed & untimed codes): 40 min    Precautions: Standard     Subjective     History of Current Condition: Sara Franco is a 47 y.o. year old Right hand dominant female who presents with right thumb pain. She reports it began a couple months ago and notices pain with forceful pinch and . The pain started at the tip of the thumb and now radiates throughout the thumb and sometimes into the wrist. She has trailed wearing a prefab thumb spica, not being able to tolerate it at night and wears it on and off during the day but has not noticed any improvement. Sara Franco is referred to Occupational Therapy for evaluation and treatment. Patient presents today alone.    Falls: none    Involved Side: Right  Dominant Side: Right    Date of Onset: couple months ago  Imaging: none  Previous Therapy: none    Pain:  Functional Pain Scale Rating 0-10:   At Best: 3/10  At Worst: 9-10/10  Current: 6/10    Location: throughout the right thumb  Description: throbbing, achy, burning  Aggravating Factors: twisting, turning,   Easing Factors: rest    Functional Limitations/Social History:  Lives with family  ADLs: independent - may require assistance with opening bottles  IADLs:  "assistance from family for cooking/cleaning  Leisure: relaxing, play cards with Aunt  Driving: Yes    Occupation:  [employer]  Working presently: employed  Duties: packing/unpacking, using computer/phone device, handling objects    Patient's Goals for Therapy: "to be able to move the thumb better again"    Past Medical History/Physical Systems Review:   Sara Franco  has a past medical history of Allergy, Fibroids, Iron deficiency anemia, unspecified, and Unspecified chronic bronchitis.    Sara Franco  has a past surgical history that includes Tubal ligation.    Sara has a current medication list which includes the following prescription(s): allerg xt,d.farinae-d.pteronys, azelastine, biotin, cetirizine, epinephrine, etodolac, fluticasone propionate, ibuprofen, and ondansetron.    Review of patient's allergies indicates:  No Known Allergies       Objective     Mental status: alert, oriented x3    Observation:   Mild swelling noted in palmar area of thumb MP   Palpable nodule in palm of thumb MP, area of A1 pulley  No triggering noted during session today but patient reports clicking and triggering at home      Sensation: numbness/tingling at night, burning pain at times      ACTIVE RANGE OF MOTION:  Measured in degrees of active motion with goniometer and/or distance measured from finger tip to the distal palmar crease (DPC)   Right  2/20/2023 Left  2/20/2023   Thumb: MP 0/70                 IP 0/22        Dist to DPC Touch to DPC Touch to DPC       STRENGTH: Not tested due to pain levels  (Measured in pounds using a Dynamometer and pinch meter)   Right  2/20/2023 Left  2/20/2023    Setting 2      Average     Key     3 Pt     Tip         Special Tests:  Thumb CMC grind test: negative    No pain with palpation to first dorsal compartment  Finkelstein's Test: negative bilaterally      Limitation/Restriction for FOTO Initial Survey    Therapist reviewed FOTO scores for Sara Franco on 2/20/2023.   FOTO documents " entered into [facility] - see Media section.    Limitation Score: Assess at next visit         Treatment     Treatment Time In: 9:30 am  Treatment Time Out: 9:45 am  Total Treatment time separate from Evaluation time: 15 min    L [id number]  Fabricated a thumb MP block orthotic, maintaining MP in extension and allowing thumb IP flexion  Also fabricated a thumb IP gutter splint in IP extension as a back up orthotic    Instructed patient to wear orthotic at night and during the day with functional activity  Instructed in gentle thumb IP flex/ext with MP block orthotic donned  Refrain from activity that causes triggering of the thumb and allow rest for the next several weeks  Recommended applying ice to the area of the nodule for pain and inflammation      Patient Education and Home Exercises     Patient/Family Education Provided:   - Role of OT, goals for OT, scheduling/cancellations - pt verbalized understanding. Discussed insurance limitations with patient.  - Anatomy and biomechanics related to symptoms  - Rationale and indication for ice application  - Orthotic wear and care, to be worn at night and during day with functional use    Written Home Exercises Provided: yes.  Exercises were reviewed and Joan was able to demonstrate them prior to the end of the session.  Joan demonstrated good  understanding of the education provided. See EMR under Patient Instructions for exercises provided during therapy sessions.     Pt was advised to perform these exercises free of pain, and to stop performing them if pain occurs.      Assessment     Sara Franco is a 47 y.o. female referred to outpatient occupational therapy and presents with the following therapy deficits: Decreased ROM, Decreased  strength, Decreased pinch strength, Decreased functional hand use, Increased pain, Edema, and Joint Stiffness and demonstrates limitations as described in the chart below. She has noted swelling, tenderness and palpable nodule of the A1  pulley of Right thumb but no noted triggering during evaluation today. She will benefit from period of rest and splinting, as well as refraining from activities requiring forceful or repetitive  and pinch. She will benefit from follow up in a couple weeks for reassessment and progress as appropriate.     Following medical record review it is determined that pt will benefit from occupational therapy services in order to maximize pain free and/or functional use of right thumb. The following goals were discussed with the patient and patient is in agreement with them as to be addressed in the treatment plan. The patient's rehab potential is Good.     Anticipated barriers to occupational therapy: none  Pt has no cultural, educational or language barriers to learning provided.    Profile and History Assessment of Occupational Performance Level of Clinical Decision Making Complexity Score   Occupational Profile:   Sara Franco is a 47 y.o. female who is currently employed Sara Franco has difficulty with  ADLs and IADLs as listed previously, which  affecting his/her daily functional abilities.      Comorbidities:    has a past medical history of Allergy, Fibroids, Iron deficiency anemia, unspecified, and Unspecified chronic bronchitis.    Medical and Therapy History Review:   Brief               Performance Deficits    Physical:  Joint Mobility  Muscle Power/Strength  Muscle Endurance  Edema   Strength  Pinch Strength  Pain    Cognitive:  No Deficits    Psychosocial:    Habits  Routines  Rituals     Clinical Decision Making:  low    Assessment Process:  Problem-Focused Assessments    Modification/Need for Assistance:  Minimal-Moderate Modifications/Assistance    Intervention Selection:  Several Treatment Options       low  Based on PMHX, co morbidities , data from assessments and functional level of assistance required with task and clinical presentation directly impacting function.         Long Term Goals (to be  met by discharge):  Date Goal Met:     1.) Sara Franco will demonstrate significantly improved functional performance from re-assessment as measured by a FOTO score of less than 20.    Goal Status:   In progress    2.) Sara Franco will return to near to prior level of function for ADLs and household management reporting independence or modified independence.    Goal Status:   In progress    3. Sara Franco will report pain 2 out of 10 at worst to increase functional use of Right hand for work and leisure tasks.    Goal Status:   In progress     Short Term Goals (to be met by 3/13/23):  Date Goal Met:     Sara Franco will be independent with home exercise program with written instructions.    Goal Status:   In progress    Sara Franco will demonstrate 35 degrees of thumb IP flexion to improve functional performance in ADLs/work/leisure tasks.    Goal Status:   In progress    Sara Franco will report relief of symptoms, such as no triggering noted, during functional use of the Right hand.    Goal Status:   In progress    Sara Franco will demonstrate the ability to complete ADL/IADL tasks with less than 4/10 pain.    Goal Status:   In progress       Plan     Pt to be treated by Occupational Therapy 2 times per week for 8 weeks during the certification period from 2/20/2023 to 5/15/23 to achieve the established goals.     Treatment to include: Paraffin, Manual therapy/joint mobilizations, Modalities for pain management, US 3 mhz, Therapeutic exercises/activities., Iontophoresis with 2.0 cc Dexamethasone, Strengthening, Orthotic Fabrication/Fit/Training, Edema Control, and Joint Protection, as well as any other treatments deemed necessary based on the patient's needs or progress.       Rosamaria Ochoa OTR/L

## 2023-02-20 NOTE — PROGRESS NOTES
[facility] Occupational Therapy  Initial Evaluation     Date: 2/20/2023  Patient: Sara Franco  Chart Number: [id number]    Medical Diagnosis: M79.644 (ICD-10-CM) - Pain of right thumb   Therapy Diagnosis:   1. Pain of right thumb  Ambulatory referral/consult to Physical/Occupational Therapy      2. Decreased range of motion of right thumb            Referring Physician: Dorota Corona NP  Physician Orders: Eval and treat    Date of Injury: couple months ago  Date of Surgery: NA  Date of Return to MD: PRN    Evaluation Date: 2/20/2023  Authorization Period: 1/31/23 - 12/30/23  Plan of Care Expiration: 5/15/23  Visit #/ Visits Authorized: 1 of 1  FOTO Completion: next visit    Time In: 9:05 am  Time Out: 9:45 am  Total Appointment Time (timed & untimed codes): 40 min    Precautions: Standard     Subjective     History of Current Condition: Sara Franco is a 47 y.o. year old Right hand dominant female who presents with right thumb pain. She reports it began a couple months ago and notices pain with forceful pinch and . The pain started at the tip of the thumb and now radiates throughout the thumb and sometimes into the wrist. She has trailed wearing a prefab thumb spica, not being able to tolerate it at night and wears it on and off during the day but has not noticed any improvement. Sara Franco is referred to Occupational Therapy for evaluation and treatment. Patient presents today alone.    Falls: none    Involved Side: Right  Dominant Side: Right    Date of Onset: couple months ago  Imaging: none  Previous Therapy: none    Pain:  Functional Pain Scale Rating 0-10:   At Best: 3/10  At Worst: 9-10/10  Current: 6/10    Location: throughout the right thumb  Description: throbbing, achy, burning  Aggravating Factors: twisting, turning,   Easing Factors: rest    Functional Limitations/Social History:  Lives with family  ADLs: independent - may require assistance with opening bottles  IADLs:  "assistance from family for cooking/cleaning  Leisure: relaxing, play cards with Aunt  Driving: Yes    Occupation:  [employer]  Working presently: employed  Duties: packing/unpacking, using computer/phone device, handling objects    Patient's Goals for Therapy: "to be able to move the thumb better again"    Past Medical History/Physical Systems Review:   Sara Franco  has a past medical history of Allergy, Fibroids, Iron deficiency anemia, unspecified, and Unspecified chronic bronchitis.    Sara Franco  has a past surgical history that includes Tubal ligation.    Sara has a current medication list which includes the following prescription(s): allerg xt,d.farinae-d.pteronys, azelastine, biotin, cetirizine, epinephrine, etodolac, fluticasone propionate, ibuprofen, and ondansetron.    Review of patient's allergies indicates:  No Known Allergies       Objective     Mental status: alert, oriented x3    Observation:   Mild swelling noted in palmar area of thumb MP   Palpable nodule in palm of thumb MP, area of A1 pulley  No triggering noted during session today but patient reports clicking and triggering at home      Sensation: numbness/tingling at night, burning pain at times      ACTIVE RANGE OF MOTION:  Measured in degrees of active motion with goniometer and/or distance measured from finger tip to the distal palmar crease (DPC)   Right  2/20/2023 Left  2/20/2023   Thumb: MP 0/70                 IP 0/22        Dist to DPC Touch to DPC Touch to DPC       STRENGTH: Not tested due to pain levels  (Measured in pounds using a Dynamometer and pinch meter)   Right  2/20/2023 Left  2/20/2023    Setting 2      Average     Key     3 Pt     Tip         Special Tests:  Thumb CMC grind test: negative    No pain with palpation to first dorsal compartment  Finkelstein's Test: negative bilaterally      Limitation/Restriction for FOTO Initial Survey    Therapist reviewed FOTO scores for Sara Franco on 2/20/2023.   FOTO documents " entered into [facility] - see Media section.    Limitation Score: Assess at next visit         Treatment     Treatment Time In: 9:30 am  Treatment Time Out: 9:45 am  Total Treatment time separate from Evaluation time: 15 min    L [id number]  Fabricated a thumb MP block orthotic, maintaining MP in extension and allowing thumb IP flexion  Also fabricated a thumb IP gutter splint in IP extension as a back up orthotic    Instructed patient to wear orthotic at night and during the day with functional activity  Instructed in gentle thumb IP flex/ext with MP block orthotic donned  Refrain from activity that causes triggering of the thumb and allow rest for the next several weeks  Recommended applying ice to the area of the nodule for pain and inflammation      Patient Education and Home Exercises     Patient/Family Education Provided:   - Role of OT, goals for OT, scheduling/cancellations - pt verbalized understanding. Discussed insurance limitations with patient.  - Anatomy and biomechanics related to symptoms  - Rationale and indication for ice application  - Orthotic wear and care, to be worn at night and during day with functional use    Written Home Exercises Provided: yes.  Exercises were reviewed and Joan was able to demonstrate them prior to the end of the session.  Joan demonstrated good  understanding of the education provided. See EMR under Patient Instructions for exercises provided during therapy sessions.     Pt was advised to perform these exercises free of pain, and to stop performing them if pain occurs.      Assessment     Sara Franco is a 47 y.o. female referred to outpatient occupational therapy and presents with the following therapy deficits: Decreased ROM, Decreased  strength, Decreased pinch strength, Decreased functional hand use, Increased pain, Edema, and Joint Stiffness and demonstrates limitations as described in the chart below. She has noted swelling, tenderness and palpable nodule of the A1  pulley of Right thumb but no noted triggering during evaluation today. She will benefit from period of rest and splinting, as well as refraining from activities requiring forceful or repetitive  and pinch. She will benefit from follow up in a couple weeks for reassessment and progress as appropriate.     Following medical record review it is determined that pt will benefit from occupational therapy services in order to maximize pain free and/or functional use of right thumb. The following goals were discussed with the patient and patient is in agreement with them as to be addressed in the treatment plan. The patient's rehab potential is Good.     Anticipated barriers to occupational therapy: none  Pt has no cultural, educational or language barriers to learning provided.    Profile and History Assessment of Occupational Performance Level of Clinical Decision Making Complexity Score   Occupational Profile:   Sara Franco is a 47 y.o. female who is currently employed Sara Franco has difficulty with  ADLs and IADLs as listed previously, which  affecting his/her daily functional abilities.      Comorbidities:    has a past medical history of Allergy, Fibroids, Iron deficiency anemia, unspecified, and Unspecified chronic bronchitis.    Medical and Therapy History Review:   Brief               Performance Deficits    Physical:  Joint Mobility  Muscle Power/Strength  Muscle Endurance  Edema   Strength  Pinch Strength  Pain    Cognitive:  No Deficits    Psychosocial:    Habits  Routines  Rituals     Clinical Decision Making:  low    Assessment Process:  Problem-Focused Assessments    Modification/Need for Assistance:  Minimal-Moderate Modifications/Assistance    Intervention Selection:  Several Treatment Options       low  Based on PMHX, co morbidities , data from assessments and functional level of assistance required with task and clinical presentation directly impacting function.         Long Term Goals (to be  met by discharge):  Date Goal Met:     1.) Sara Franco will demonstrate significantly improved functional performance from re-assessment as measured by a FOTO score of less than 20.    Goal Status:   In progress    2.) Sara Franco will return to near to prior level of function for ADLs and household management reporting independence or modified independence.    Goal Status:   In progress    3. Sara Franco will report pain 2 out of 10 at worst to increase functional use of Right hand for work and leisure tasks.    Goal Status:   In progress     Short Term Goals (to be met by 3/13/23):  Date Goal Met:     Sara Franco will be independent with home exercise program with written instructions.    Goal Status:   In progress    Saar Franco will demonstrate 35 degrees of thumb IP flexion to improve functional performance in ADLs/work/leisure tasks.    Goal Status:   In progress    Sara Franco will report relief of symptoms, such as no triggering noted, during functional use of the Right hand.    Goal Status:   In progress    Sara Franco will demonstrate the ability to complete ADL/IADL tasks with less than 4/10 pain.    Goal Status:   In progress       Plan     Pt to be treated by Occupational Therapy 2 times per week for 8 weeks during the certification period from 2/20/2023 to 5/15/23 to achieve the established goals.     Treatment to include: Paraffin, Manual therapy/joint mobilizations, Modalities for pain management, US 3 mhz, Therapeutic exercises/activities., Iontophoresis with 2.0 cc Dexamethasone, Strengthening, Orthotic Fabrication/Fit/Training, Edema Control, and Joint Protection, as well as any other treatments deemed necessary based on the patient's needs or progress.       Rosamaria Ochoa OTR/L

## 2023-03-13 ENCOUNTER — CLINICAL SUPPORT (OUTPATIENT)
Dept: REHABILITATION | Facility: HOSPITAL | Age: 48
End: 2023-03-13
Payer: COMMERCIAL

## 2023-03-13 DIAGNOSIS — M25.641 DECREASED RANGE OF MOTION OF RIGHT THUMB: ICD-10-CM

## 2023-03-13 DIAGNOSIS — M79.644 PAIN OF RIGHT THUMB: Primary | ICD-10-CM

## 2023-03-13 DIAGNOSIS — R52 PAIN: Primary | ICD-10-CM

## 2023-03-13 PROCEDURE — 97035 APP MDLTY 1+ULTRASOUND EA 15: CPT

## 2023-03-13 PROCEDURE — 97530 THERAPEUTIC ACTIVITIES: CPT

## 2023-03-13 NOTE — PROGRESS NOTES
Occupational Therapy Daily Treatment Note     Name: Sara Franco  Clinic Number: [id number]    Therapy Diagnosis:   Encounter Diagnoses   Name Primary?    Pain of right thumb Yes    Decreased range of motion of right thumb      Physician: Dorota Corona NP    Visit Date: 3/13/2023    Physician Orders: Eval and treat     Date of Injury: couple months ago  Date of Surgery: NA  Date of Return to MD: PRN     Evaluation Date: 2/20/2023  Authorization Period: 1/31/23 - 12/30/23  Plan of Care Expiration: 5/15/23  Visit #/ Visits Authorized: 1 of 19  FOTO Completion: next visit    Time In:2:30 pm  Time Out: 3:00 pm   Total Billable Time: 30 minutes    Precautions:  Standard      Subjective     Pt reports: She reports the trigger thumb symptoms had calmed down for a while, but recently exacerbated again  she was compliant with home exercise program given last session.   Response to previous treatment: minimized symptoms for a while with orthotic use  Functional change: she remains limited in activities of daily living by thumb pain/limited     Pain: 7/10  Location: right thumb     Objective       Mental status: alert, oriented x3     Observation:   Mild swelling noted in palmar area of thumb MP   Palpable nodule in palm of thumb MP, area of A1 pulley  No triggering noted during session today but patient reports clicking and triggering at home        Sensation: numbness/tingling at night, burning pain at times        ACTIVE RANGE OF MOTION:  Measured in degrees of active motion with goniometer and/or distance measured from finger tip to the distal palmar crease (DPC)    Right  2/20/2023 Left  2/20/2023   Thumb: MP 0/70                  IP 0/22         Dist to DPC Touch to DPC Touch to DPC         STRENGTH: Not tested due to pain levels  (Measured in pounds using a Dynamometer and pinch meter)    Right  2/20/2023 Left  2/20/2023    Setting 2        Average       Key       3 Pt       Tip             Special  Tests:  Thumb CMC grind test: negative     No pain with palpation to first dorsal compartment  Finkelstein's Test: negative bilaterally        Limitation/Restriction for FOTO Initial Survey     Therapist reviewed FOTO scores for Sara Franco on 2/20/2023.   FOTO documents entered into [facility] - see Media section.     Limitation Score: Assess at next visit           Treatment       Joan received the following direct contact modalities after being cleared for contraindications for 8 minutes:  - Patient received ultrasound to thumb A1 area to increase blood flow, circulation, tissue elasticity, pain management and for wound/scar management for 8 minutes @ 3.3 Mhz, Intensity 0.6 w/cm2 at 100% duty cycle.      Joan participated in dynamic functional therapeutic activities to improve functional performance for 22  minutes, including:  - Fabricated thumb IP block orthosis for continued conservative management of trigger thumb   - Instructed on passive range of motion only for thumb to reduce episodes of triggering. Checked for proper performance of passive IP flexion stretch with MP in extension, and CMC opposition stretch with IP and MP in extension  - discussed scheduling a visit with hand MD (pt was referred to therapy by PCP)      Home Exercises and Education Provided     Education provided:   - use of additional thumb IP block splint   - Progress towards goals     Written Home Exercises Provided: Patient instructed to cont prior HEP.  Exercises were reviewed and Joan was able to demonstrate them prior to the end of the session.  Joan demonstrated good  understanding of the HEP provided.   .   See EMR under Patient Instructions for exercises provided prior visit.        Assessment     Saar tolerated OT session well including US. Reports good fit of orthotic. Reports thumb is interfering with daily activities - she scheduled a visit with Dr. Grullon.     Joan is progressing well towards her goals and there are no  updates to goals at this time. Pt prognosis is Good.     Pt will continue to benefit from skilled outpatient occupational therapy to address the deficits listed in the problem list on initial evaluation provide pt/family education and to maximize pt's level of independence in the home and community environment.     Anticipated barriers to occupational therapy: none     Pt's spiritual, cultural and educational needs considered and pt agreeable to plan of care and goals.       Long Term Goals (to be met by discharge):  Date Goal Met:       1.) Sara Franco will demonstrate significantly improved functional performance from re-assessment as measured by a FOTO score of less than 20.     Goal Status:   In progress     2.) Sara Franco will return to near to prior level of function for ADLs and household management reporting independence or modified independence.     Goal Status:   In progress     3. Sara Franco will report pain 2 out of 10 at worst to increase functional use of Right hand for work and leisure tasks.     Goal Status:   In progress      Short Term Goals (to be met by 3/13/23):  Date Goal Met:       Sara Franco will be independent with home exercise program with written instructions.     Goal Status:   In progress     Sara Franco will demonstrate 35 degrees of thumb IP flexion to improve functional performance in ADLs/work/leisure tasks.     Goal Status:   In progress     Sara Franco will report relief of symptoms, such as no triggering noted, during functional use of the Right hand.     Goal Status:   In progress     Sara Franco will demonstrate the ability to complete ADL/IADL tasks with less than 4/10 pain.     Goal Status:   In progress         Plan      Pt to be treated by Occupational Therapy 2 times per week for 8 weeks during the certification period from 2/20/2023 to 5/15/23 to achieve the established goals.      Treatment to include: Paraffin, Manual therapy/joint mobilizations, Modalities  for pain management, US 3 mhz, Therapeutic exercises/activities., Iontophoresis with 2.0 cc Dexamethasone, Strengthening, Orthotic Fabrication/Fit/Training, Edema Control, and Joint Protection, as well as any other treatments deemed necessary based on the patient's needs or progress.          Lydia Cota, OT

## 2023-03-24 ENCOUNTER — TELEPHONE (OUTPATIENT)
Dept: ORTHOPEDICS | Facility: CLINIC | Age: 48
End: 2023-03-24
Payer: COMMERCIAL

## 2023-03-24 ENCOUNTER — PATIENT MESSAGE (OUTPATIENT)
Dept: SPORTS MEDICINE | Facility: HOSPITAL | Age: 48
End: 2023-03-24
Payer: COMMERCIAL

## 2023-03-24 NOTE — TELEPHONE ENCOUNTER
Attempted to reach patient but her voicemail is not setup. I will attempt to send her a my chart message

## 2023-03-29 ENCOUNTER — TELEPHONE (OUTPATIENT)
Dept: ORTHOPEDICS | Facility: CLINIC | Age: 48
End: 2023-03-29
Payer: COMMERCIAL

## 2023-03-29 NOTE — TELEPHONE ENCOUNTER
I attempted to call the patient to remind them of their appointment with Dr Reyes but their mailbox was either full or not set up

## 2023-03-31 ENCOUNTER — TELEPHONE (OUTPATIENT)
Dept: ORTHOPEDICS | Facility: CLINIC | Age: 48
End: 2023-03-31
Payer: COMMERCIAL

## 2023-03-31 NOTE — TELEPHONE ENCOUNTER
I attempted to call the patient to rescheduled their  appointment with Dr Reyes but their mailbox was either full or not set up

## 2023-04-06 ENCOUNTER — TELEPHONE (OUTPATIENT)
Dept: ORTHOPEDICS | Facility: CLINIC | Age: 48
End: 2023-04-06
Payer: COMMERCIAL

## 2023-04-06 DIAGNOSIS — M79.641 RIGHT HAND PAIN: Primary | ICD-10-CM

## 2023-04-14 ENCOUNTER — TELEPHONE (OUTPATIENT)
Dept: ORTHOPEDICS | Facility: CLINIC | Age: 48
End: 2023-04-14
Payer: COMMERCIAL

## 2023-04-19 ENCOUNTER — TELEPHONE (OUTPATIENT)
Dept: ORTHOPEDICS | Facility: CLINIC | Age: 48
End: 2023-04-19
Payer: COMMERCIAL

## 2023-04-19 DIAGNOSIS — M79.644 THUMB PAIN, RIGHT: Primary | ICD-10-CM

## 2023-04-20 ENCOUNTER — HOSPITAL ENCOUNTER (OUTPATIENT)
Dept: RADIOLOGY | Facility: OTHER | Age: 48
Discharge: HOME | End: 2023-04-20
Attending: ORTHOPAEDIC SURGERY
Payer: COMMERCIAL

## 2023-04-20 ENCOUNTER — OFFICE VISIT (OUTPATIENT)
Dept: ORTHOPEDICS | Facility: CLINIC | Age: 48
End: 2023-04-20
Payer: COMMERCIAL

## 2023-04-20 VITALS
DIASTOLIC BLOOD PRESSURE: 90 MMHG | BODY MASS INDEX: 29.53 KG/M2 | SYSTOLIC BLOOD PRESSURE: 139 MMHG | WEIGHT: 173 LBS | HEIGHT: 64 IN | HEART RATE: 64 BPM

## 2023-04-20 DIAGNOSIS — R52 PAIN: ICD-10-CM

## 2023-04-20 DIAGNOSIS — M65.30 TRIGGER FINGER OF RIGHT HAND, UNSPECIFIED FINGER: Primary | ICD-10-CM

## 2023-04-20 PROCEDURE — 3075F PR MOST RECENT SYSTOLIC BLOOD PRESS GE 130-139MM HG: ICD-10-PCS | Mod: CPTII,S$GLB,, | Performed by: ORTHOPAEDIC SURGERY

## 2023-04-20 PROCEDURE — 1159F PR MEDICATION LIST DOCUMENTED IN MEDICAL RECORD: ICD-10-PCS | Mod: CPTII,S$GLB,, | Performed by: ORTHOPAEDIC SURGERY

## 2023-04-20 PROCEDURE — 73140 X-RAY EXAM OF FINGER(S): CPT | Mod: 26,RT,, | Performed by: RADIOLOGY

## 2023-04-20 PROCEDURE — 99204 OFFICE O/P NEW MOD 45 MIN: CPT | Mod: 25,S$GLB,, | Performed by: ORTHOPAEDIC SURGERY

## 2023-04-20 PROCEDURE — 3075F SYST BP GE 130 - 139MM HG: CPT | Mod: CPTII,S$GLB,, | Performed by: ORTHOPAEDIC SURGERY

## 2023-04-20 PROCEDURE — 73140 X-RAY EXAM OF FINGER(S): CPT | Mod: TC,FY,RT

## 2023-04-20 PROCEDURE — 3008F PR BODY MASS INDEX (BMI) DOCUMENTED: ICD-10-PCS | Mod: CPTII,S$GLB,, | Performed by: ORTHOPAEDIC SURGERY

## 2023-04-20 PROCEDURE — 20550 NJX 1 TENDON SHEATH/LIGAMENT: CPT | Mod: RT,S$GLB,, | Performed by: ORTHOPAEDIC SURGERY

## 2023-04-20 PROCEDURE — 3008F BODY MASS INDEX DOCD: CPT | Mod: CPTII,S$GLB,, | Performed by: ORTHOPAEDIC SURGERY

## 2023-04-20 PROCEDURE — 99204 PR OFFICE/OUTPT VISIT, NEW, LEVL IV, 45-59 MIN: ICD-10-PCS | Mod: 25,S$GLB,, | Performed by: ORTHOPAEDIC SURGERY

## 2023-04-20 PROCEDURE — 99999 PR PBB SHADOW E&M-EST. PATIENT-LVL III: CPT | Mod: PBBFAC,,, | Performed by: ORTHOPAEDIC SURGERY

## 2023-04-20 PROCEDURE — 20550 TENDON SHEATH: ICD-10-PCS | Mod: RT,S$GLB,, | Performed by: ORTHOPAEDIC SURGERY

## 2023-04-20 PROCEDURE — 99999 PR PBB SHADOW E&M-EST. PATIENT-LVL III: ICD-10-PCS | Mod: PBBFAC,,, | Performed by: ORTHOPAEDIC SURGERY

## 2023-04-20 PROCEDURE — 3080F DIAST BP >= 90 MM HG: CPT | Mod: CPTII,S$GLB,, | Performed by: ORTHOPAEDIC SURGERY

## 2023-04-20 PROCEDURE — 1159F MED LIST DOCD IN RCRD: CPT | Mod: CPTII,S$GLB,, | Performed by: ORTHOPAEDIC SURGERY

## 2023-04-20 PROCEDURE — 73140 XR FINGER 2 OR MORE VIEWS RIGHT: ICD-10-PCS | Mod: 26,RT,, | Performed by: RADIOLOGY

## 2023-04-20 PROCEDURE — 3080F PR MOST RECENT DIASTOLIC BLOOD PRESSURE >= 90 MM HG: ICD-10-PCS | Mod: CPTII,S$GLB,, | Performed by: ORTHOPAEDIC SURGERY

## 2023-04-20 RX ADMIN — DEXAMETHASONE SODIUM PHOSPHATE 4 MG: 4 INJECTION, SOLUTION INTRA-ARTICULAR; INTRALESIONAL; INTRAMUSCULAR; INTRAVENOUS; SOFT TISSUE at 11:04

## 2023-04-20 NOTE — PROGRESS NOTES
Subjective:      Patient ID: Sara Franco is a 48 y.o. female.    Chief Complaint: Pain of the Right Hand      HPI    Patient is a 48 y.o. right hand dominant female who presents today with complaints of right thumb pain.     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    Symptoms are aggravated by during the day with work activities, opening bottles and using instruments with grasping and pinching or turning.     Symptoms are alleviated by therapy.    Symptoms consist of sharp pain, swelling, and locking . The pain sometimes awaken her at night.     The patient rates pain as a 9/10.    The patient describes pain as more tolerable today dull and achy    Attempted treatment(s) and/or interventions include activity modification, physical and/or occupational therapy, and immobilization.     The patient denies any fevers, chills, N/V, D/C and presents for evaluation.      Review of patient's allergies indicates:  No Known Allergies      Current Outpatient Medications   Medication Sig Dispense Refill    allerg xt,D.farinae-D.pteronys (ODACTRA) 12 SQ-HDM Subl Place 1 tablet under the tongue once daily. 30 tablet 11    biotin 1 mg tablet Take 1,000 mcg by mouth 3 (three) times daily.      cetirizine (ZYRTEC) 10 MG tablet Take 10 mg by mouth once daily.      etodolac (LODINE) 400 MG tablet Take 1 tablet (400 mg total) by mouth 2 (two) times daily as needed (Pain). Take with food 10 tablet 0    fluticasone propionate (FLONASE) 50 mcg/actuation nasal spray fluticasone propionate 50 mcg/actuation nasal spray,suspension   USE 2 SPRAY(S) IN EACH NOSTRIL ONCE DAILY AS NEEDED      ibuprofen (ADVIL,MOTRIN) 600 MG tablet Take 1 tablet (600 mg total) by mouth every 6 (six) hours as needed for Pain. 20 tablet 0    ondansetron (ZOFRAN-ODT) 4 MG TbDL ondansetron 4 mg disintegrating tablet   DISSOLVE 1 TABLET IN MOUTH EVERY 4 TO 6 HOURS AS NEEDED       "azelastine (ASTELIN) 137 mcg (0.1 %) nasal spray 2 sprays (274 mcg total) by Nasal route 2 (two) times daily as needed for Rhinitis. Donot snort (because it tastes bad) 30 mL 11    EPINEPHrine (AUVI-Q) 0.3 mg/0.3 mL AtIn Inject 0.3 mLs (0.3 mg total) into the muscle once. 0.3 mL 3     No current facility-administered medications for this visit.       Past Medical History:   Diagnosis Date    Allergy     Fibroids     Iron deficiency anemia, unspecified     Unspecified chronic bronchitis        Past Surgical History:   Procedure Laterality Date    TUBAL LIGATION         Review of Systems:  Constitutional: Negative for chills and fever.   Respiratory: Negative for cough and shortness of breath.    Gastrointestinal: Negative for nausea and vomiting.   Skin: Negative for rash.   Neurological: Negative for dizziness and headaches.   Psychiatric/Behavioral: Negative for depression.   MSK as in HPI       OBJECTIVE:     PHYSICAL EXAM:  BP (!) 139/90   Pulse 64   Ht 5' 4" (1.626 m)   Wt 78.5 kg (173 lb)   BMI 29.70 kg/m²     GEN:  NAD, well-developed, well-groomed.  NEURO: Awake, alert, and oriented. Normal attention and concentration.    PSYCH: Normal mood and affect. Behavior is normal.  HEENT: No cervical lymphadenopathy noted.  CARDIOVASCULAR: Radial pulses 2+ bilaterally. No LE edema noted.  PULMONARY: Breath sounds normal. No respiratory distress.  SKIN: Intact, no rashes.      MSK:     RUE:  Good active ROM of the wrist and fingers. AIN/PIN/Radial/Median/Ulnar Nerves assessed in isolation without deficit. Radial & Ulnar arteries palpated 2+. Capillary Refill <3s. TTP at A1 pulley, right thumb    LUE:  Good active ROM of the wrist and fingers. AIN/PIN/Radial/Median/Ulnar Nerves assessed in isolation without deficit. Radial & Ulnar arteries palpated 2+. Capillary Refill <3s.      RADIOGRAPHS:  04/20/23 Right thumb  FINDINGS:  No acute fracture or dislocation seen.  Cartilage spaces are fairly well maintained.  No " significant soft tissue edema.     Comments: I have personally reviewed the imaging and I agree with the above radiologist's report.    ASSESSMENT/PLAN:   No diagnosis found.       No orders of the defined types were placed in this encounter.      Plan:     We discussed the patient's pathology treatment options bracing therapy anti-inflammatory cream injections surgical options      The patient indicates understanding of these issues and agrees to the plan.        This note has been scribed in part by Jennifer Quinonez, ATC/L, OTC, my Sports Medicine Assistant (SMA). This SMA performed & documented a complete history pre-assessment including the history of present illness, which I, Lida Reyes MD, explored & confirmed personally with the patient. The SMA has scribed portions of this note including my physical exanimation, diagnostic imaging interpretation, procedures performed, my plan of care & diagnosis. I agree that the scribed documentation is accurate & complete.

## 2023-04-20 NOTE — PROCEDURES
Tendon Sheath    Date/Time: 4/20/2023 11:00 AM  Performed by: Lida Reyes MD  Authorized by: Lida Reyes MD     Consent Done?:  Yes (Verbal)  Indications:  Pain  Timeout: prior to procedure the correct patient, procedure, and site was verified    Prep: patient was prepped and draped in usual sterile fashion      Local anesthesia used?: Yes    Anesthesia:  Local infiltration  Local anesthetic:  Lidocaine 1% without epinephrine  Location:  Thumb  Site:  R thumb MCP  Ultrasonic guidance for needle placement?: Yes    Needle size:  25 G  Medications:  4 mg dexAMETHasone 4 mg/mL

## 2023-04-21 RX ORDER — DEXAMETHASONE SODIUM PHOSPHATE 4 MG/ML
4 INJECTION, SOLUTION INTRA-ARTICULAR; INTRALESIONAL; INTRAMUSCULAR; INTRAVENOUS; SOFT TISSUE
Status: DISCONTINUED | OUTPATIENT
Start: 2023-04-20 | End: 2023-04-21 | Stop reason: HOSPADM

## 2023-06-27 ENCOUNTER — PATIENT MESSAGE (OUTPATIENT)
Dept: SURGERY | Facility: CLINIC | Age: 48
End: 2023-06-27
Payer: COMMERCIAL

## 2023-06-27 ENCOUNTER — TELEPHONE (OUTPATIENT)
Dept: SURGERY | Facility: CLINIC | Age: 48
End: 2023-06-27
Payer: COMMERCIAL

## 2023-06-27 DIAGNOSIS — Z12.11 SCREENING FOR COLON CANCER: Primary | ICD-10-CM

## 2023-06-27 RX ORDER — SODIUM, POTASSIUM,MAG SULFATES 17.5-3.13G
1 SOLUTION, RECONSTITUTED, ORAL ORAL DAILY
Qty: 1 KIT | Refills: 0 | Status: SHIPPED | OUTPATIENT
Start: 2023-06-27 | End: 2023-06-29

## 2023-06-27 RX ORDER — SODIUM CHLORIDE 0.9 % (FLUSH) 0.9 %
3 SYRINGE (ML) INJECTION
Status: CANCELLED | OUTPATIENT
Start: 2023-06-27

## 2023-06-27 NOTE — TELEPHONE ENCOUNTER
Called patient in reference to a referral to Colorectal Surgery for colon cancer screening. Patient verbally consented to a Colonoscopy and requested to be scheduled for a Colonoscopy on 08/16/2023 Patient was advised a designated  is required on the day of the Colonoscopy to drive the patient home and the  must be at least. 18 years old.Colonoscopy Prep instructions were thoroughly explained and discussed with the patient.It was emphasized, and reiterated to the patient, to please not to follow the bowel prep instructions that comes with the bowel prep package.However, to please follow the prep instructions that will be received in the mail,or via the [employer] portal, or by both modes of delivery, which ever method of delivery the patient prefers,from the [facility]   Patient acknowledges understanding Prep instructions as explained and discussed on the phone.. Patient was advised the Colonoscopy Prep instructions discussed and explained on the phone,are being mailed out to the patient's verified address on file,or put onto the [employer] portal,or both methods of delivery, whichever the patient prefers. Patient's address on file was verified with the patient for accuracy of mailing. Patient's medications on file was reviewed with the patient for accuracy of information. Patient denies taking  any other medications other than those listed and verified on medication profile.Patient was explained the Colonoscopy will be performed here at [facility]. Patient was further explained the Pre-Op will call one day prior to the procedure date, to discuss Pre-Op instructions;and what time to report for the Colonoscopy. The patient was given the opportunity to ask any questions about the Colonoscopy. No further issues were discussed.

## 2023-06-27 NOTE — TELEPHONE ENCOUNTER
The patient has been advised the Colonoscopy Prep Kit will be ordered from the patient's verified preferred pharmacy on file. The medication can  be picked up by the patient, or the patient's designated representative.The patient was further explained the Colonoscopy Prep instructions will be mailed to the patient verified mailing address on file, or put onto the [employer] portal, whichever method of delivery the patient prefers.Additionally this patient was informed,not to follow the instructions that comes with the bowel prep medication. However, the patient was instructed to please follow the Colonoscopy Bowel Prep instructions that's being provided by the . The patient was asked to please to follow the Colonoscopy Prep instructions being provided as precisely,and  meticulously as possible.The patient was advised you  will receive a follow up phone call to summarize the Colonoscopy Prep instructions prior to the scheduled Colonoscopy procedure date. At this time the patient will be given an opportunity to ask any questions regarding the Colonoscopy procedure, and it's associated Bowel Prep instructions.

## 2023-06-29 ENCOUNTER — TELEPHONE (OUTPATIENT)
Dept: VASCULAR SURGERY | Facility: CLINIC | Age: 48
End: 2023-06-29
Payer: COMMERCIAL

## 2023-06-29 DIAGNOSIS — I83.813 VARICOSE VEINS OF BILATERAL LOWER EXTREMITIES WITH PAIN: ICD-10-CM

## 2023-06-29 DIAGNOSIS — I83.819 VARICOSE VEINS WITH PAIN, UNSPECIFIED LATERALITY: Primary | ICD-10-CM

## 2023-06-29 NOTE — TELEPHONE ENCOUNTER
Attempted to contact the pt to schedule the EVLT us but no answer and no voicemail setup.Spoke with the pt's  and informed him of u/s needed prior to the appt on 7/05/23.Pt's  verbalized understanding of information received.

## 2023-06-30 ENCOUNTER — OFFICE VISIT (OUTPATIENT)
Dept: DERMATOLOGY | Facility: CLINIC | Age: 48
End: 2023-06-30
Payer: COMMERCIAL

## 2023-06-30 DIAGNOSIS — L72.0 EIC (EPIDERMAL INCLUSION CYST): ICD-10-CM

## 2023-06-30 DIAGNOSIS — D48.5 NEOPLASM OF UNCERTAIN BEHAVIOR OF SKIN: Primary | ICD-10-CM

## 2023-06-30 DIAGNOSIS — L91.8 ST (SKIN TAG): ICD-10-CM

## 2023-06-30 PROCEDURE — 1159F MED LIST DOCD IN RCRD: CPT | Mod: CPTII,S$GLB,, | Performed by: DERMATOLOGY

## 2023-06-30 PROCEDURE — 99202 PR OFFICE/OUTPT VISIT, NEW, LEVL II, 15-29 MIN: ICD-10-PCS | Mod: 25,S$GLB,, | Performed by: DERMATOLOGY

## 2023-06-30 PROCEDURE — 1159F PR MEDICATION LIST DOCUMENTED IN MEDICAL RECORD: ICD-10-PCS | Mod: CPTII,S$GLB,, | Performed by: DERMATOLOGY

## 2023-06-30 PROCEDURE — 99999 PR PBB SHADOW E&M-EST. PATIENT-LVL III: ICD-10-PCS | Mod: PBBFAC,,, | Performed by: DERMATOLOGY

## 2023-06-30 PROCEDURE — 88305 TISSUE EXAM BY PATHOLOGIST: CPT | Performed by: STUDENT IN AN ORGANIZED HEALTH CARE EDUCATION/TRAINING PROGRAM

## 2023-06-30 PROCEDURE — 88305 TISSUE EXAM BY PATHOLOGIST: ICD-10-PCS | Mod: 26,,, | Performed by: STUDENT IN AN ORGANIZED HEALTH CARE EDUCATION/TRAINING PROGRAM

## 2023-06-30 PROCEDURE — 1160F RVW MEDS BY RX/DR IN RCRD: CPT | Mod: CPTII,S$GLB,, | Performed by: DERMATOLOGY

## 2023-06-30 PROCEDURE — 1160F PR REVIEW ALL MEDS BY PRESCRIBER/CLIN PHARMACIST DOCUMENTED: ICD-10-PCS | Mod: CPTII,S$GLB,, | Performed by: DERMATOLOGY

## 2023-06-30 PROCEDURE — 11102 TANGNTL BX SKIN SINGLE LES: CPT | Mod: S$GLB,,, | Performed by: DERMATOLOGY

## 2023-06-30 PROCEDURE — 99999 PR PBB SHADOW E&M-EST. PATIENT-LVL III: CPT | Mod: PBBFAC,,, | Performed by: DERMATOLOGY

## 2023-06-30 PROCEDURE — 11102 PR TANGENTIAL BIOPSY, SKIN, SINGLE LESION: ICD-10-PCS | Mod: S$GLB,,, | Performed by: DERMATOLOGY

## 2023-06-30 PROCEDURE — 99202 OFFICE O/P NEW SF 15 MIN: CPT | Mod: 25,S$GLB,, | Performed by: DERMATOLOGY

## 2023-06-30 PROCEDURE — 88305 TISSUE EXAM BY PATHOLOGIST: CPT | Mod: 26,,, | Performed by: STUDENT IN AN ORGANIZED HEALTH CARE EDUCATION/TRAINING PROGRAM

## 2023-06-30 NOTE — PATIENT INSTRUCTIONS
Shave Biopsy Wound Care    Your doctor has performed a shave biopsy today.  A band aid and vaseline ointment has been placed over the site.  This should remain in place for NO LONGER THAN 48 hours.  It is fine to remove the bandaid after 24 hours, if the area is no longer bleeding. It is recommended that you keep the area dry (do not wet)) for the first 24 hours.  After 24 hours, wash the area with warm soap and water and apply Vaseline jelly.  Many patients prefer to use Neosporin or Bacitracin ointment.  This is acceptable; however, know that you can develop an allergy to this medication even if you have used it safely for years.  It is important to keep the area moist.  Letting it dry out and get air slows healing time, and will worsen the scar.        If you notice increasing redness, tenderness, pain, or yellow drainage at the biopsy site, please notify your doctor.  These are signs of an infection.    If your biopsy site is bleeding, apply firm pressure for 15 minutes straight.  Repeat for another 15 minutes, if it is still bleeding.   If the surgical site continues to bleed, then please contact your doctor.      For [facility] users:   You will receive your biopsy results in [facility] as soon as they are available. Please be assured that your physician/provider will review your results and will then determine what further treatment, evaluation, or planning is required. You should be contacted by your physician's/provider's office within 5 business days of receiving your results; If not, please reach out to directly. This is one more way [facility] is putting you first.     [facility] [place] [address]/ [phone] FAX/ [email or web address]     Sun Protection      The [facility] would like to remind you of the importance of sun protection all year round and particularly during the summer when the suns rays are the strongest. It has been proven that both acute  and chronic sun exposure damages our cells and leads to skin cancer. Beyond skin cancer, the sun causes 90% of the symptoms of premature skin aging, including wrinkles, lentigines (brown spots), and thin, easily bruised skin. Proper sun protection can help prevent these unwanted conditions.    Many patients report that they dont go in the sun. It has been shown that the average person receives 18 hours of incidental sun exposure per week during activities such as walking through parking lots, driving, or sitting next to windows. This accumulates to several bad sunburns per year!    In choosing sunscreen, you want one that protects against both UVA and UVB rays (broad spectrum). It is recommended that you use one of SPF 30 or higher. It is important to apply the sunscreen about 20 minutes prior to sun exposure. Most sunscreens are chemical sunscreens and a reaction must take place in the skin so that they are effective. If they are applied and then you are immediately exposed to the sun or start sweating, this reaction has not had time to take place and you are therefore unprotected. Sunscreen needs to be reapplied every 2 hours if you are participating in water sports or sweating. We recommend Elta MD or CeraVe sunscreens for daily use; however there are many options and it is most important for you to find one that you will use on a consistent basis.    If you have sensitive skin, you may do best with a sunscreen that contains only physical blockers in the active ingredient section. The only physical blockers available in the [place] currently are titanium dioxide or zinc oxide. These are typically thicker and harder to apply, however they afford very good protection. Neutrogena Sensitive Skin, Blue Lizard Sensitive Skin (pink top) or Neutrogena Pure and Free are popular ones.     Aside from sunscreen, clothes with UV protection (UPF), wide brimmed hats, and sunglasses are other means of sun protection that we  recommend.      Based on a recent study (6/2021) and out of an abundance of caution, we are recommending that you AVOID the following sunscreens as they may contain the carcinogen, benzene:    Spray and gel sunscreens  Any [facility] or [facility] brands as well as Max Block and TopCare brands   Neutrogena Ultra Sheer Dry-touch Water Resistant Sunscreen LOTION SPF 70   Neutrogena Sheer Zinc Dry-touch Face Sunscreen LOTION SPF 50   5.   Aveeno Baby Continuous Protection Sensitive Skin Sunscreen LOTION - Broad Spectrum SPF 50    Please note that Benzene is not an ingredient or the degradation product of any ingredient in any sunscreen. This study suggested that the findings are a result of contamination in the manufacturing process. At this point, we don't know how effectively Benzene gets through the skin, if it gets absorbed systemically, and what effects it may have.     We do know that ultraviolet radiation is a well-established carcinogen. Please use daily sun protection/avoidance and use of at least SPF 30, broad-spectrum sunscreen not listed above.                       [facility]  [address]  [facility] [address]  Dept: [phone]  Dept Fax: [phone]

## 2023-06-30 NOTE — PROGRESS NOTES
Subjective:      Patient ID:  Sara Franco is a 48 y.o. female who presents for   Chief Complaint   Patient presents with    Lesion     L leg      HPI  Patient with new complaint of lesion(s)  Location: L leg  Duration: >1yr  Symptoms: pain, tender  Relieving factors/Previous treatments: none  States lately, it's been sensitive to touch.    Pt also c/o varicose vein to L leg as well near lesion of concern.    Also c/o bumps on side of neck and in axillae, Asymptomatic and no prior treatment.    Review of Systems   Constitutional:  Negative for fever and chills.   Skin:  Negative for itching and rash.     Objective:   Physical Exam   Constitutional: She appears well-developed and well-nourished. No distress.   Neurological: She is alert and oriented to person, place, and time. She is not disoriented.   Psychiatric: She has a normal mood and affect.   Skin:                   Diagram Legend     Erythematous scaling macule/papule c/w actinic keratosis       Vascular papule c/w angioma      Pigmented verrucoid papule/plaque c/w seborrheic keratosis      Yellow umbilicated papule c/w sebaceous hyperplasia      Irregularly shaped tan macule c/w lentigo     1-2 mm smooth white papules consistent with Milia      Movable subcutaneous cyst with punctum c/w epidermal inclusion cyst      Subcutaneous movable cyst c/w pilar cyst      Firm pink to brown papule c/w dermatofibroma      Pedunculated fleshy papule(s) c/w skin tag(s)      Evenly pigmented macule c/w junctional nevus     Mildly variegated pigmented, slightly irregular-bordered macule c/w mildly atypical nevus      Flesh colored to evenly pigmented papule c/w intradermal nevus       Pink pearly papule/plaque c/w basal cell carcinoma      Erythematous hyperkeratotic cursted plaque c/w SCC      Surgical scar with no sign of skin cancer recurrence      Open and closed comedones      Inflammatory papules and pustules      Verrucoid papule consistent consistent with wart      Erythematous eczematous patches and plaques     Dystrophic onycholytic nail with subungual debris c/w onychomycosis     Umbilicated papule    Erythematous-base heme-crusted tan verrucoid plaque consistent with inflamed seborrheic keratosis     Erythematous Silvery Scaling Plaque c/w Psoriasis     See annotation      Assessment / Plan:    Neoplasm of uncertain behavior of skin  Shave biopsy procedure note:    Shave biopsy performed after verbal consent including risk of infection, scar, recurrence, need for additional treatment of site. Area prepped with alcohol, anesthetized with approximately 1.0cc of 1% lidocaine with epinephrine. Lesional tissue shaved with razor blade. Hemostasis achieved with application of aluminum chloride followed by hyfrecation. No complications. Dressing applied. Wound care explained.    -     Specimen to Pathology, Dermatology  Pathology Orders:       Normal Orders This Visit    Specimen to Pathology, Dermatology     Comments:    Number of Specimens:->1  ------------------------->-------------------------  Spec 1 Procedure:->Biopsy  Spec 1 Clinical Impression:->r/o pyogenic granuloma vs other  hemangioma vs inflamed nevus vs other  Spec 1 Source:->L lateral thigh    Questions:    Procedure Type: Dermatology and skin neoplasms    Number of Specimens: 1    ------------------------: -------------------------    Spec 1 Procedure: Biopsy    Spec 1 Clinical Impression: r/o pyogenic granuloma vs other hemangioma vs inflamed nevus vs other    Spec 1 Source: L lateral thigh    Release to patient:           Consider punch excision vs vascular surgery consult (based on bx results) if lesion recurs.    EIC (epidermal inclusion cyst)  This is a benign cyst of the hair follicle. Reassurance provided. No treatment is necessary unless it is symptomatic.     ST (skin tag)  Reassurance given to patient. No treatment is necessary.   Treatment of benign, asymptomatic lesions may be considered  cosmetic.      Follow up if symptoms worsen or fail to improve.

## 2023-07-03 ENCOUNTER — HOSPITAL ENCOUNTER (OUTPATIENT)
Dept: VASCULAR SURGERY | Facility: CLINIC | Age: 48
Discharge: HOME | End: 2023-07-03
Attending: SURGERY
Payer: COMMERCIAL

## 2023-07-03 DIAGNOSIS — I87.2 VENOUS INSUFFICIENCY: Primary | ICD-10-CM

## 2023-07-03 DIAGNOSIS — I83.813 VARICOSE VEINS OF BILATERAL LOWER EXTREMITIES WITH PAIN: ICD-10-CM

## 2023-07-03 DIAGNOSIS — M79.605 LEFT LEG PAIN: ICD-10-CM

## 2023-07-03 PROCEDURE — 93970 EXTREMITY STUDY: CPT | Mod: S$GLB,,, | Performed by: SURGERY

## 2023-07-03 PROCEDURE — 93970 PR US DUPLEX, UPPER OR LOWER EXT VENOUS,COMPLETE BILAT: ICD-10-PCS | Mod: S$GLB,,, | Performed by: SURGERY

## 2023-07-05 ENCOUNTER — OFFICE VISIT (OUTPATIENT)
Dept: VASCULAR SURGERY | Facility: CLINIC | Age: 48
End: 2023-07-05
Payer: COMMERCIAL

## 2023-07-05 VITALS
WEIGHT: 176.38 LBS | SYSTOLIC BLOOD PRESSURE: 109 MMHG | DIASTOLIC BLOOD PRESSURE: 65 MMHG | HEART RATE: 68 BPM | HEIGHT: 64 IN | BODY MASS INDEX: 30.11 KG/M2 | TEMPERATURE: 98 F

## 2023-07-05 DIAGNOSIS — I83.813 VARICOSE VEINS OF BILATERAL LOWER EXTREMITIES WITH PAIN: Primary | ICD-10-CM

## 2023-07-05 PROCEDURE — 99204 PR OFFICE/OUTPT VISIT, NEW, LEVL IV, 45-59 MIN: ICD-10-PCS | Mod: S$GLB,,, | Performed by: SURGERY

## 2023-07-05 PROCEDURE — 3008F PR BODY MASS INDEX (BMI) DOCUMENTED: ICD-10-PCS | Mod: CPTII,S$GLB,, | Performed by: SURGERY

## 2023-07-05 PROCEDURE — 99999 PR PBB SHADOW E&M-EST. PATIENT-LVL III: CPT | Mod: PBBFAC,,, | Performed by: SURGERY

## 2023-07-05 PROCEDURE — 99204 OFFICE O/P NEW MOD 45 MIN: CPT | Mod: S$GLB,,, | Performed by: SURGERY

## 2023-07-05 PROCEDURE — 3078F DIAST BP <80 MM HG: CPT | Mod: CPTII,S$GLB,, | Performed by: SURGERY

## 2023-07-05 PROCEDURE — 3074F SYST BP LT 130 MM HG: CPT | Mod: CPTII,S$GLB,, | Performed by: SURGERY

## 2023-07-05 PROCEDURE — 1159F MED LIST DOCD IN RCRD: CPT | Mod: CPTII,S$GLB,, | Performed by: SURGERY

## 2023-07-05 PROCEDURE — 3078F PR MOST RECENT DIASTOLIC BLOOD PRESSURE < 80 MM HG: ICD-10-PCS | Mod: CPTII,S$GLB,, | Performed by: SURGERY

## 2023-07-05 PROCEDURE — 3074F PR MOST RECENT SYSTOLIC BLOOD PRESSURE < 130 MM HG: ICD-10-PCS | Mod: CPTII,S$GLB,, | Performed by: SURGERY

## 2023-07-05 PROCEDURE — 1159F PR MEDICATION LIST DOCUMENTED IN MEDICAL RECORD: ICD-10-PCS | Mod: CPTII,S$GLB,, | Performed by: SURGERY

## 2023-07-05 PROCEDURE — 99999 PR PBB SHADOW E&M-EST. PATIENT-LVL III: ICD-10-PCS | Mod: PBBFAC,,, | Performed by: SURGERY

## 2023-07-05 PROCEDURE — 3008F BODY MASS INDEX DOCD: CPT | Mod: CPTII,S$GLB,, | Performed by: SURGERY

## 2023-07-05 NOTE — PROGRESS NOTES
Sara Franco  07/05/2023    HPI:  Patient is a 48 y.o. female with a h/o chronic bronchitis on albuterol, anemia who is here today for evaluation of persistent leg discomfort secondary to significant superficial venous insufficiency: she notes L leg giving symptoms such as aching pain, swelling after a long day at work. She works at [employer] so is on her feet all day. Reports that R leg swelling does not correspond to long work hours, but does not have pain in the R leg. Has not tried compression stockings yet.    - MI, - stroke  Tobacco use: Never smoker  History of DVT/PE: Neither    Gene Kaur Jr, NP   Employment: Employed: Work at [employer]    Pain does not interfere with daily activities; has not attempted elevation and analgesics with minimal relief and pain persists.      Current Outpatient Medications:     allerg xt,D.farinae-Milton (ODACTRA) 12 SQ-HDM Subl, Place 1 tablet under the tongue once daily., Disp: 30 tablet, Rfl: 11    biotin 1 mg tablet, Take 1,000 mcg by mouth 3 (three) times daily., Disp: , Rfl:     cetirizine (ZYRTEC) 10 MG tablet, Take 10 mg by mouth once daily., Disp: , Rfl:     etodolac (LODINE) 400 MG tablet, Take 1 tablet (400 mg total) by mouth 2 (two) times daily as needed (Pain). Take with food, Disp: 10 tablet, Rfl: 0    fluticasone propionate (FLONASE) 50 mcg/actuation nasal spray, fluticasone propionate 50 mcg/actuation nasal spray,suspension  USE 2 SPRAY(S) IN EACH NOSTRIL ONCE DAILY AS NEEDED, Disp: , Rfl:     ibuprofen (ADVIL,MOTRIN) 600 MG tablet, Take 1 tablet (600 mg total) by mouth every 6 (six) hours as needed for Pain., Disp: 20 tablet, Rfl: 0    ondansetron (ZOFRAN-ODT) 4 MG TbDL, ondansetron 4 mg disintegrating tablet  DISSOLVE 1 TABLET IN MOUTH EVERY 4 TO 6 HOURS AS NEEDED, Disp: , Rfl:     azelastine (ASTELIN) 137 mcg (0.1 %) nasal spray, 2 sprays (274 mcg total) by Nasal route 2 (two) times daily as needed for Rhinitis. Donot snort (because it tastes bad), Disp:  30 mL, Rfl: 11    EPINEPHrine (AUVI-Q) 0.3 mg/0.3 mL AtIn, Inject 0.3 mLs (0.3 mg total) into the muscle once., Disp: 0.3 mL, Rfl: 3    PHYSICAL EXAM:   Right Arm BP - Sittin/65 (23 1415)  Left Arm BP - Sittin/69 (23 1415)  Pulse: 68  Temp: 98.2 °F (36.8 °C)            Abdomen: Soft, nontender, nondistended, no masses or organomegaly     No rebound tenderness noted; bowel sounds normal     Pulsatile aortic mass is not palpable.      Extremities:   2+ femoral pulses bilaterally     Posterior pedal pulses palpable.     Edema/leg swelling:  Slight swelling in the Left leg     No hyperpigmentation.    LAB RESULTS:  No results found for: K, CREATININE  No results found for: WBC, HCT, PLT  No results found for: HGBA1C    IMAGING (I have independently reviewed and interpreted the following tests):  Venous U/S (7/3/23):  R GSV: 3.9 mm prox thigh, 2.5 mm mid calf  L GSV: no reflux  R, L SSV: no reflux  No DVT    IMP/PLAN:     48 y.o. female with a h/o chronic bronchitis on albuterol, anemia with Chronic venous insufficiency - CEAP C1a -- L leg   No reflux in L leg; asymptomatic R GSV reflux in R leg  Spider veins - cosmeticv in L thigh; she prefers to rx medically w compression  F/u PRN with vein clinic       Meliton Null MD DFSVS FACS   Vascular/Endovascular Surgery

## 2023-07-07 LAB
FINAL PATHOLOGIC DIAGNOSIS: NORMAL
Lab: NORMAL

## 2023-07-17 ENCOUNTER — OFFICE VISIT (OUTPATIENT)
Dept: OBSTETRICS AND GYNECOLOGY | Facility: CLINIC | Age: 48
End: 2023-07-17
Attending: OBSTETRICS & GYNECOLOGY
Payer: COMMERCIAL

## 2023-07-17 VITALS
DIASTOLIC BLOOD PRESSURE: 83 MMHG | SYSTOLIC BLOOD PRESSURE: 129 MMHG | BODY MASS INDEX: 30.42 KG/M2 | WEIGHT: 178.19 LBS | HEIGHT: 64 IN

## 2023-07-17 DIAGNOSIS — N95.0 POSTMENOPAUSAL BLEEDING: Primary | ICD-10-CM

## 2023-07-17 DIAGNOSIS — N89.8 VAGINAL DISCHARGE: ICD-10-CM

## 2023-07-17 PROCEDURE — 3074F PR MOST RECENT SYSTOLIC BLOOD PRESSURE < 130 MM HG: ICD-10-PCS | Mod: CPTII,S$GLB,, | Performed by: OBSTETRICS & GYNECOLOGY

## 2023-07-17 PROCEDURE — 99999 PR PBB SHADOW E&M-EST. PATIENT-LVL III: CPT | Mod: PBBFAC,,, | Performed by: OBSTETRICS & GYNECOLOGY

## 2023-07-17 PROCEDURE — 3008F PR BODY MASS INDEX (BMI) DOCUMENTED: ICD-10-PCS | Mod: CPTII,S$GLB,, | Performed by: OBSTETRICS & GYNECOLOGY

## 2023-07-17 PROCEDURE — 3074F SYST BP LT 130 MM HG: CPT | Mod: CPTII,S$GLB,, | Performed by: OBSTETRICS & GYNECOLOGY

## 2023-07-17 PROCEDURE — 99214 OFFICE O/P EST MOD 30 MIN: CPT | Mod: S$GLB,,, | Performed by: OBSTETRICS & GYNECOLOGY

## 2023-07-17 PROCEDURE — 3079F PR MOST RECENT DIASTOLIC BLOOD PRESSURE 80-89 MM HG: ICD-10-PCS | Mod: CPTII,S$GLB,, | Performed by: OBSTETRICS & GYNECOLOGY

## 2023-07-17 PROCEDURE — 99214 PR OFFICE/OUTPT VISIT, EST, LEVL IV, 30-39 MIN: ICD-10-PCS | Mod: S$GLB,,, | Performed by: OBSTETRICS & GYNECOLOGY

## 2023-07-17 PROCEDURE — 1159F MED LIST DOCD IN RCRD: CPT | Mod: CPTII,S$GLB,, | Performed by: OBSTETRICS & GYNECOLOGY

## 2023-07-17 PROCEDURE — 3008F BODY MASS INDEX DOCD: CPT | Mod: CPTII,S$GLB,, | Performed by: OBSTETRICS & GYNECOLOGY

## 2023-07-17 PROCEDURE — 3079F DIAST BP 80-89 MM HG: CPT | Mod: CPTII,S$GLB,, | Performed by: OBSTETRICS & GYNECOLOGY

## 2023-07-17 PROCEDURE — 1159F PR MEDICATION LIST DOCUMENTED IN MEDICAL RECORD: ICD-10-PCS | Mod: CPTII,S$GLB,, | Performed by: OBSTETRICS & GYNECOLOGY

## 2023-07-17 PROCEDURE — 81514 NFCT DS BV&VAGINITIS DNA ALG: CPT | Performed by: OBSTETRICS & GYNECOLOGY

## 2023-07-17 PROCEDURE — 99999 PR PBB SHADOW E&M-EST. PATIENT-LVL III: ICD-10-PCS | Mod: PBBFAC,,, | Performed by: OBSTETRICS & GYNECOLOGY

## 2023-07-17 RX ORDER — KETOCONAZOLE 20 MG/G
CREAM TOPICAL
COMMUNITY
Start: 2023-05-17

## 2023-07-17 RX ORDER — NYSTATIN 100000 [USP'U]/G
POWDER TOPICAL
COMMUNITY
Start: 2023-06-07

## 2023-07-17 RX ORDER — IBUPROFEN 200 MG
200 TABLET ORAL EVERY 6 HOURS PRN
COMMUNITY
End: 2023-12-04 | Stop reason: CLARIF

## 2023-07-17 RX ORDER — TRIAMCINOLONE ACETONIDE 1 MG/G
CREAM TOPICAL
COMMUNITY
Start: 2023-06-07

## 2023-07-17 RX ORDER — ALBUTEROL SULFATE 90 UG/1
2 AEROSOL, METERED RESPIRATORY (INHALATION) EVERY 4 HOURS PRN
COMMUNITY
Start: 2023-06-07

## 2023-07-19 LAB
BACTERIAL VAGINOSIS DNA: POSITIVE
CANDIDA GLABRATA DNA: NEGATIVE
CANDIDA KRUSEI DNA: NEGATIVE
CANDIDA RRNA VAG QL PROBE: POSITIVE
T VAGINALIS RRNA GENITAL QL PROBE: NEGATIVE

## 2023-07-19 NOTE — PROGRESS NOTES
SUBJECTIVE:   48 y.o. female  presents today complaining of vaginal bleeding. . No LMP recorded. Patient is premenopausal..  She reports no period in over 1 year. She had spotting in  for 4 days. She has a known history of fibroids. .        Past Medical History:   Diagnosis Date    Allergy     Fibroids     Iron deficiency anemia, unspecified     Unspecified chronic bronchitis      Past Surgical History:   Procedure Laterality Date    TUBAL LIGATION       Social History     Socioeconomic History    Marital status:    Tobacco Use    Smoking status: Never    Smokeless tobacco: Never   Substance and Sexual Activity    Alcohol use: Yes     Comment: socially - monthly    Drug use: No    Sexual activity: Yes     Partners: Male     Birth control/protection: See Surgical Hx, Condom     Comment: occasionaly uses condoms     Family History   Problem Relation Age of Onset    Eczema Daughter     Allergic rhinitis Daughter     Breast cancer Maternal Aunt     Colon cancer Neg Hx     Ovarian cancer Neg Hx      OB History    Para Term  AB Living   3 3 3         SAB IAB Ectopic Multiple Live Births                  # Outcome Date GA Lbr Rafa/2nd Weight Sex Delivery Anes PTL Lv   3 Term            2 Term            1 Term                    Current Outpatient Medications   Medication Sig Dispense Refill    albuterol (PROVENTIL/VENTOLIN HFA) 90 mcg/actuation inhaler 2 puffs every 4 (four) hours as needed.      allerg xt,D.farinae-D.pteronys (ODACTRA) 12 SQ-HDM Subl Place 1 tablet under the tongue once daily. 30 tablet 11    biotin 1 mg tablet Take 1,000 mcg by mouth 3 (three) times daily.      cetirizine (ZYRTEC) 10 MG tablet Take 10 mg by mouth once daily.      EPINEPHrine (AUVI-Q) 0.3 mg/0.3 mL AtIn Inject 0.3 mLs (0.3 mg total) into the muscle once. 0.3 mL 3    ibuprofen (ADVIL,MOTRIN) 200 MG tablet Take 200 mg by mouth every 6 (six) hours as needed for Pain.      ketoconazole (NIZORAL) 2 % cream  SMARTSI Application Topical 1 to 2 Times Daily      nystatin (MYCOSTATIN) powder SMARTSIG:Packet(s) Topical Twice Daily      triamcinolone acetonide 0.1% (KENALOG) 0.1 % cream SMARTSIG:sparingly Topical Twice Daily       No current facility-administered medications for this visit.     Allergies: Patient has no known allergies.     The ASCVD Risk score (Iftikhar DK, et al., 2019) failed to calculate for the following reasons:    Cannot find a previous HDL lab    Cannot find a previous total cholesterol lab      ROS:  Constitutional: no weight loss, weight gain, fever, fatigue  Eyes:  No vision changes, glasses/contacts  ENT/Mouth: No ulcers, sinus problems, ears ringing, headache  Cardiovascular: No inability to lie flat, chest pain, exercise intolerance, swelling, heart palpitations  Respiratory: No wheezing, coughing blood, shortness of breath, or cough  Gastrointestinal: No diarrhea, bloody stool, nausea/vomiting, constipation, gas, hemorrhoids  Genitourinary: No blood in urine, painful urination, urgency of urination, frequency of urination, incomplete emptying, incontinence, abnormal bleeding, painful periods, heavy periods, vaginal discharge, vaginal odor, painful intercourse, sexual problems, bleeding after intercourse.  Musculoskeletal: No muscle weakness  Skin/Breast: No painful breasts, nipple discharge, masses, rash, ulcers  Neurological: No passing out, seizures, numbness, headache  Endocrine: No diabetes, hypothyroid, hyperthyroid, hot flashes, hair loss, abnormal hair growth, acne  Psychiatric: No depression, crying  Hematologic: No bruises, bleeding, swollen lymph nodes, anemia.      Physical Exam  General- well developed, well nourished  Vulva- no masses, no lesions  Vagina-  no masses, no lesions, +discharge  Cervix- no Cervical motion tenderness, no lesions  Uterus- normal size, nontender  Adnexa- nontender, no masses      ASSESSMENT:   Postmenopausal bleeding  Vaginal discharge  PLAN:   Pelvic  ultrasound  Follow up affirm  Return for endometrial biopsy

## 2023-07-20 ENCOUNTER — TELEPHONE (OUTPATIENT)
Dept: OBSTETRICS AND GYNECOLOGY | Facility: CLINIC | Age: 48
End: 2023-07-20
Payer: COMMERCIAL

## 2023-07-20 RX ORDER — FLUCONAZOLE 150 MG/1
150 TABLET ORAL ONCE
Qty: 1 TABLET | Refills: 0 | Status: SHIPPED | OUTPATIENT
Start: 2023-07-20 | End: 2023-07-20

## 2023-07-20 RX ORDER — METRONIDAZOLE 7.5 MG/G
1 GEL VAGINAL DAILY
Qty: 70 G | Refills: 0 | Status: SHIPPED | OUTPATIENT
Start: 2023-07-20 | End: 2023-07-25

## 2023-07-24 ENCOUNTER — HOSPITAL ENCOUNTER (OUTPATIENT)
Dept: RADIOLOGY | Facility: OTHER | Age: 48
Discharge: HOME | End: 2023-07-24
Attending: OBSTETRICS & GYNECOLOGY
Payer: COMMERCIAL

## 2023-07-24 DIAGNOSIS — N95.0 POSTMENOPAUSAL BLEEDING: ICD-10-CM

## 2023-07-24 PROCEDURE — 76856 US EXAM PELVIC COMPLETE: CPT | Mod: 26,,, | Performed by: RADIOLOGY

## 2023-07-24 PROCEDURE — 76830 TRANSVAGINAL US NON-OB: CPT | Mod: 26,,, | Performed by: RADIOLOGY

## 2023-07-24 PROCEDURE — 76830 US PELVIS COMP WITH TRANSVAG NON-OB (XPD): ICD-10-PCS | Mod: 26,,, | Performed by: RADIOLOGY

## 2023-07-24 PROCEDURE — 76856 US EXAM PELVIC COMPLETE: CPT | Mod: TC

## 2023-07-24 PROCEDURE — 76856 US PELVIS COMP WITH TRANSVAG NON-OB (XPD): ICD-10-PCS | Mod: 26,,, | Performed by: RADIOLOGY

## 2023-07-25 ENCOUNTER — PATIENT MESSAGE (OUTPATIENT)
Dept: OBSTETRICS AND GYNECOLOGY | Facility: CLINIC | Age: 48
End: 2023-07-25
Payer: COMMERCIAL

## 2023-07-25 DIAGNOSIS — N95.0 PMB (POSTMENOPAUSAL BLEEDING): Primary | ICD-10-CM

## 2023-07-25 RX ORDER — MISOPROSTOL 200 UG/1
TABLET ORAL
Qty: 2 TABLET | Refills: 0 | Status: ON HOLD | OUTPATIENT
Start: 2023-07-25 | End: 2023-08-16

## 2023-08-03 ENCOUNTER — PROCEDURE VISIT (OUTPATIENT)
Dept: OBSTETRICS AND GYNECOLOGY | Facility: CLINIC | Age: 48
End: 2023-08-03
Attending: OBSTETRICS & GYNECOLOGY
Payer: COMMERCIAL

## 2023-08-03 VITALS
WEIGHT: 179 LBS | SYSTOLIC BLOOD PRESSURE: 118 MMHG | BODY MASS INDEX: 30.56 KG/M2 | HEIGHT: 64 IN | HEART RATE: 67 BPM | DIASTOLIC BLOOD PRESSURE: 79 MMHG

## 2023-08-03 DIAGNOSIS — N95.0 PMB (POSTMENOPAUSAL BLEEDING): Primary | ICD-10-CM

## 2023-08-03 DIAGNOSIS — R93.89 THICKENED ENDOMETRIUM: ICD-10-CM

## 2023-08-03 PROCEDURE — 88305 TISSUE EXAM BY PATHOLOGIST: ICD-10-PCS | Mod: 26,,, | Performed by: PATHOLOGY

## 2023-08-03 PROCEDURE — 88305 TISSUE EXAM BY PATHOLOGIST: CPT | Performed by: PATHOLOGY

## 2023-08-03 PROCEDURE — 58100 BIOPSY OF UTERUS LINING: CPT | Mod: S$GLB,,, | Performed by: OBSTETRICS & GYNECOLOGY

## 2023-08-03 PROCEDURE — 88305 TISSUE EXAM BY PATHOLOGIST: CPT | Mod: 26,,, | Performed by: PATHOLOGY

## 2023-08-03 PROCEDURE — 58100 ENDOMETRIAL BIOPSY- TODAY: ICD-10-PCS | Mod: S$GLB,,, | Performed by: OBSTETRICS & GYNECOLOGY

## 2023-08-03 NOTE — PROCEDURES
Endometrial Biopsy- Today    Date/Time: 8/3/2023 1:00 PM    Performed by: Zaida Land MD  Authorized by: Zaida Land MD    Consent:     Consent obtained:  Written    Consent given by:  Patient    Patient questions answered: yes      Patient agrees, verbalizes understanding, and wants to proceed: yes      Educational handouts given: yes      Instructions and paperwork completed: yes    Indication:     Indications: Post-menopausal bleeding      Chronicity of post-menopausal bleeding:  New    Progression of post-menopausal bleeding:  Resolved  Pre-procedure:     Pre-procedure timeout performed: yes    Procedure:     Procedure: endometrial biopsy with Pipelle      Cervix cleaned and prepped: yes      A paracervical block was performed: no      An intracervical block was performed: no      The cervix was dilated: no      Uterus sounded: yes      Uterus sound depth (cm):  7    Specimen collected: specimen collected and sent to pathology      Patient tolerated procedure well with no complications: yes    Comments:     Procedure comments:  TVUS showed 6mm endometrial stripe and 2 small fibroids.

## 2023-08-07 LAB
FINAL PATHOLOGIC DIAGNOSIS: NORMAL
GROSS: NORMAL
Lab: NORMAL

## 2023-09-18 ENCOUNTER — TELEPHONE (OUTPATIENT)
Dept: OBSTETRICS AND GYNECOLOGY | Facility: CLINIC | Age: 48
End: 2023-09-18
Payer: COMMERCIAL

## 2023-09-18 NOTE — TELEPHONE ENCOUNTER
Called pt to see if she seen the message Dr Land sent to her. Pt states she has not and she is having problems with her log in. Advised pt what was the message that Dr Land sent her. Pt states what ever Dr Land thinks she should do she is fine. Advised pt will check with Dr Land and let pt know

## 2023-09-18 NOTE — TELEPHONE ENCOUNTER
----- Message from Zaida Land MD sent at 9/16/2023  1:54 PM CDT -----  Can you reach out and see if she saw her results and my message?

## 2023-09-25 ENCOUNTER — PATIENT MESSAGE (OUTPATIENT)
Dept: OBSTETRICS AND GYNECOLOGY | Facility: CLINIC | Age: 48
End: 2023-09-25
Payer: COMMERCIAL

## 2023-09-28 PROBLEM — J02.9 PHARYNGITIS: Status: ACTIVE | Noted: 2023-09-28

## 2023-09-28 PROBLEM — J06.9 VIRAL URI WITH COUGH: Status: ACTIVE | Noted: 2023-09-28

## 2023-10-19 ENCOUNTER — TELEPHONE (OUTPATIENT)
Dept: OBSTETRICS AND GYNECOLOGY | Facility: CLINIC | Age: 48
End: 2023-10-19

## 2023-10-19 DIAGNOSIS — N95.0 PMB (POSTMENOPAUSAL BLEEDING): Primary | ICD-10-CM

## 2023-10-19 DIAGNOSIS — R93.89 THICKENED ENDOMETRIUM: ICD-10-CM

## 2023-10-19 NOTE — TELEPHONE ENCOUNTER
Pt scheduled surgery D&C hyst on 12/27. Pl scheduled for preop and advised pt needs preop clearance from PCP  Please sign case request

## 2023-12-04 ENCOUNTER — HOSPITAL ENCOUNTER (OUTPATIENT)
Dept: PREADMISSION TESTING | Facility: OTHER | Age: 48
Discharge: HOME | End: 2023-12-04
Attending: OBSTETRICS & GYNECOLOGY
Payer: COMMERCIAL

## 2023-12-04 ENCOUNTER — OFFICE VISIT (OUTPATIENT)
Dept: OBSTETRICS AND GYNECOLOGY | Facility: CLINIC | Age: 48
End: 2023-12-04
Attending: OBSTETRICS & GYNECOLOGY
Payer: COMMERCIAL

## 2023-12-04 ENCOUNTER — ANESTHESIA EVENT (OUTPATIENT)
Dept: SURGERY | Facility: OTHER | Age: 48
End: 2023-12-04
Payer: COMMERCIAL

## 2023-12-04 VITALS
HEIGHT: 64 IN | RESPIRATION RATE: 16 BRPM | TEMPERATURE: 98 F | SYSTOLIC BLOOD PRESSURE: 120 MMHG | DIASTOLIC BLOOD PRESSURE: 73 MMHG | WEIGHT: 177 LBS | BODY MASS INDEX: 30.22 KG/M2 | OXYGEN SATURATION: 96 % | HEART RATE: 91 BPM

## 2023-12-04 VITALS
HEIGHT: 64 IN | HEART RATE: 75 BPM | WEIGHT: 179 LBS | SYSTOLIC BLOOD PRESSURE: 130 MMHG | DIASTOLIC BLOOD PRESSURE: 89 MMHG | BODY MASS INDEX: 30.56 KG/M2

## 2023-12-04 DIAGNOSIS — N95.0 POSTMENOPAUSE BLEEDING: Primary | ICD-10-CM

## 2023-12-04 DIAGNOSIS — N95.0 POSTMENOPAUSAL BLEEDING: ICD-10-CM

## 2023-12-04 PROCEDURE — 99999 PR PBB SHADOW E&M-EST. PATIENT-LVL III: CPT | Mod: PBBFAC,,, | Performed by: OBSTETRICS & GYNECOLOGY

## 2023-12-04 PROCEDURE — 3075F SYST BP GE 130 - 139MM HG: CPT | Mod: CPTII,S$GLB,, | Performed by: OBSTETRICS & GYNECOLOGY

## 2023-12-04 PROCEDURE — 99214 OFFICE O/P EST MOD 30 MIN: CPT | Mod: S$GLB,,, | Performed by: OBSTETRICS & GYNECOLOGY

## 2023-12-04 PROCEDURE — 1159F PR MEDICATION LIST DOCUMENTED IN MEDICAL RECORD: ICD-10-PCS | Mod: CPTII,S$GLB,, | Performed by: OBSTETRICS & GYNECOLOGY

## 2023-12-04 PROCEDURE — 99214 PR OFFICE/OUTPT VISIT, EST, LEVL IV, 30-39 MIN: ICD-10-PCS | Mod: S$GLB,,, | Performed by: OBSTETRICS & GYNECOLOGY

## 2023-12-04 PROCEDURE — 99999 PR PBB SHADOW E&M-EST. PATIENT-LVL III: ICD-10-PCS | Mod: PBBFAC,,, | Performed by: OBSTETRICS & GYNECOLOGY

## 2023-12-04 PROCEDURE — 3075F PR MOST RECENT SYSTOLIC BLOOD PRESS GE 130-139MM HG: ICD-10-PCS | Mod: CPTII,S$GLB,, | Performed by: OBSTETRICS & GYNECOLOGY

## 2023-12-04 PROCEDURE — 3008F BODY MASS INDEX DOCD: CPT | Mod: CPTII,S$GLB,, | Performed by: OBSTETRICS & GYNECOLOGY

## 2023-12-04 PROCEDURE — 1159F MED LIST DOCD IN RCRD: CPT | Mod: CPTII,S$GLB,, | Performed by: OBSTETRICS & GYNECOLOGY

## 2023-12-04 PROCEDURE — 3079F DIAST BP 80-89 MM HG: CPT | Mod: CPTII,S$GLB,, | Performed by: OBSTETRICS & GYNECOLOGY

## 2023-12-04 PROCEDURE — 3079F PR MOST RECENT DIASTOLIC BLOOD PRESSURE 80-89 MM HG: ICD-10-PCS | Mod: CPTII,S$GLB,, | Performed by: OBSTETRICS & GYNECOLOGY

## 2023-12-04 PROCEDURE — 3008F PR BODY MASS INDEX (BMI) DOCUMENTED: ICD-10-PCS | Mod: CPTII,S$GLB,, | Performed by: OBSTETRICS & GYNECOLOGY

## 2023-12-04 RX ORDER — HYDROCODONE BITARTRATE AND ACETAMINOPHEN 5; 325 MG/1; MG/1
1 TABLET ORAL EVERY 6 HOURS PRN
Qty: 6 TABLET | Refills: 0 | Status: SHIPPED | OUTPATIENT
Start: 2023-12-04

## 2023-12-04 RX ORDER — ACETAMINOPHEN 500 MG
1000 TABLET ORAL
Status: CANCELLED | OUTPATIENT
Start: 2023-12-04 | End: 2023-12-04

## 2023-12-04 RX ORDER — SODIUM CHLORIDE 9 MG/ML
INJECTION, SOLUTION INTRAVENOUS CONTINUOUS
Status: CANCELLED | OUTPATIENT
Start: 2023-12-04

## 2023-12-04 RX ORDER — LIDOCAINE HYDROCHLORIDE 10 MG/ML
0.5 INJECTION, SOLUTION EPIDURAL; INFILTRATION; INTRACAUDAL; PERINEURAL ONCE
Status: CANCELLED | OUTPATIENT
Start: 2023-12-04 | End: 2023-12-04

## 2023-12-04 RX ORDER — ALBUTEROL SULFATE 2.5 MG/.5ML
2.5 SOLUTION RESPIRATORY (INHALATION)
Status: CANCELLED | OUTPATIENT
Start: 2023-12-04 | End: 2023-12-04

## 2023-12-04 RX ORDER — FAMOTIDINE 20 MG/1
20 TABLET, FILM COATED ORAL
Status: CANCELLED | OUTPATIENT
Start: 2023-12-04

## 2023-12-04 RX ORDER — SODIUM CHLORIDE, SODIUM LACTATE, POTASSIUM CHLORIDE, CALCIUM CHLORIDE 600; 310; 30; 20 MG/100ML; MG/100ML; MG/100ML; MG/100ML
INJECTION, SOLUTION INTRAVENOUS CONTINUOUS
Status: CANCELLED | OUTPATIENT
Start: 2023-12-04

## 2023-12-04 NOTE — DISCHARGE INSTRUCTIONS
Information to Prepare you for your Surgery    PRE-ADMIT TESTING -  [phone]    [address] [facility]          Your surgery has been scheduled at [facility]. We are pleased to have the opportunity to serve you. For Further Information please call [phone].    On the day of surgery please report to the Information Desk on the 1st floor.    CONTACT YOUR PHYSICIAN'S OFFICE THE DAY PRIOR TO YOUR SURGERY TO OBTAIN YOUR ARRIVAL TIME.     The evening before surgery do not eat anything after 9 p.m. ( this includes hard candy, chewing gum and mints).  You may only have GATORADE, POWERADE AND WATER  from 9 p.m. until you leave your home.   DO NOT DRINK ANY LIQUIDS ON THE WAY TO THE HOSPITAL.      Why does your anesthesiologist allow you to drink Gatorade/Powerade before surgery?  Gatorade/Powerade helps to increase your comfort before surgery and to decrease your nausea after surgery. The carbohydrates in Gatorade/Powerade help reduce your body's stress response to surgery.  If you are a diabetic-drink only water prior to surgery.    Outpatient Surgery- May allow 2 adult (18 and older) Support Persons (1 being the designated ) for all surgical/procedural patients. A breastfeeding mother will be allowed her infant and 2 adult Support Persons. No one under the age of 18 will be allowed in the building.      SPECIAL MEDICATION INSTRUCTIONS: TAKE medications checked off by the Anesthesiologist on your Medication List.    Angiogram Patients: Take medications as instructed by your physician, including aspirin.     Surgery Patients:    If you take ASPIRIN - Your PHYSICIAN/SURGEON will need to inform you IF/OR when you need to stop taking aspirin prior to your surgery.     The week prior to surgery do not ot take any medications containing IBUPROFEN or NSAIDS ( Advil, Motrin, Goodys, BC, Aleve, Naproxen etc) If you are not sure if you should take a medicine  please call your surgeon's office.  Ok to take Tylenol    Do Not Wear any make-up (especially eye make-up) to surgery. Please remove any false eyelashes or eyelash extensions. If you arrive the day of surgery with makeup/eyelashes on you will be required to remove prior to surgery. (There is a risk of corneal abrasions if eye makeup/eyelash extensions are not removed)      Leave all valuables at home.   Do Not wear any jewelry or watches, including any metal in body piercings. Jewelry must be removed prior to coming to the hospital.  There is a possibility that rings that are unable to be removed may be cut off if they are on the surgical extremity.    Please remove all hair extensions, wigs, clips and any other metal accessories/ ornaments from your hair.  These items may pose a flammable/fire risk in Surgery and must be removed.    Do not shave your surgical area at least 5 days prior to your surgery. The surgical prep will be performed at the hospital according to Infection Control regulations.    Contact Lens must be removed before surgery. Either do not wear the contact lens or bring a case and solution for storage.  Please bring a container for eyeglasses or dentures as required.  Bring any paperwork your physician has provided, such as consent forms,  history and physicals, doctor's orders, etc.   Bring comfortable clothes that are loose fitting to wear upon discharge. Take into consideration the type of surgery being performed.  Maintain your diet as advised per your physician the day prior to surgery.      Adequate rest the night before surgery is advised.   Park in the Parking lot behind the hospital or in the [facility] across the street from the parking lot. Parking is complimentary.  If you will be discharged the same day as your procedure, please arrange for a responsible adult to drive you home or to accompany you if traveling by taxi.   YOU WILL NOT BE PERMITTED TO DRIVE OR TO LEAVE THE  HOSPITAL ALONE AFTER SURGERY.   If you are being discharged the same day, it is strongly recommended that you arrange for someone to remain with you for the first 24 hrs following your surgery.    The Surgeon will speak to your family/visitor after your surgery regarding the outcome of your surgery and post op care.  The Surgeon may speak to you after your surgery, but there is a possibility you may not remember the details.  Please check with your family members regarding the conversation with the Surgeon.    We strongly recommend whoever is bringing you home be present for discharge instructions.  This will ensure a thorough understanding for your post op home care.          Thank you for your cooperation.  The Staff of [facility].            Bathing Instructions with Hibiclens    Shower the evening before and morning of your procedure with Chlorhexidine (Hibiclens)  do not use Chlorhexidine on your face or genitals. Do not get in your eyes.  Wash your face with water and your regular face wash/soap  Use your regular shampoo  Apply Chlorhexidine (Hibiclens) directly on your skin or on a wet washcloth and wash gently. When showering: Move away from the shower stream when applying Chlorhexidine (Hibiclens) to avoid rinsing off too soon.  Rinse thoroughly with warm water  Do not dilute Chlorhexidine (Hibiclens)   Dry off as usual, do not use any deodorant, powder, body lotions, perfume, after shave or cologne.

## 2023-12-04 NOTE — ANESTHESIA PREPROCEDURE EVALUATION
12/04/2023  Sara Alcaraz is a 48 y.o., female.      Pre-op Assessment    I have reviewed the Patient Summary Reports.     I have reviewed the Nursing Notes. I have reviewed the NPO Status.   I have reviewed the Medications.     Review of Systems  Anesthesia Hx:             Denies Family Hx of Anesthesia complications.    Denies Personal Hx of Anesthesia complications.                    Social:  Non-Smoker       Hematology/Oncology:    Oncology Normal    -- Anemia:                                  Cardiovascular:  Cardiovascular Normal                   Venous insufficiency                         Pulmonary:   COPD (Chronic bronchitis)                     Musculoskeletal:  Musculoskeletal Normal                Neurological:  Neurology Normal                                      Endocrine:        Obesity / BMI > 30      Physical Exam  General: Well nourished, Cooperative, Alert and Oriented    Airway:  Mallampati: II   Mouth Opening: Normal  TM Distance: Normal  Tongue: Normal  Neck ROM: Normal ROM    Dental:  Intact, Caps / Implants        Anesthesia Plan  Type of Anesthesia, risks & benefits discussed:    Anesthesia Type: Gen Supraglottic Airway, Gen ETT  Intra-op Monitoring Plan: Standard ASA Monitors  Post Op Pain Control Plan: multimodal analgesia  Induction:  IV  Airway Plan: Video, Post-Induction  Informed Consent: Informed consent signed with the Patient and all parties understand the risks and agree with anesthesia plan.  All questions answered.   ASA Score: 2  Anesthesia Plan Notes: CBC  Pre-op eval with labs on paper chart.    Ready For Surgery From Anesthesia Perspective.     .

## 2023-12-05 NOTE — PROGRESS NOTES
History & Physical            OBGYN    C.C Pre-op Exam      HPI : Sara Alcaraz is a 48 y.o. female  for evaluation and discussion of treatment options for Pre-op Exam  She had postmenopausal bleeding and thickened endometrial stripe on ultrasound.  Endometrial biopsy in the office was insufficient     EXAMINATION:  US PELVIS COMP WITH TRANSVAG NON-OB (XPD)     CLINICAL HISTORY:  Postmenopausal bleeding     TECHNIQUE:  Transabdominal sonography of the pelvis was performed, followed by transvaginal sonography to better evaluate the uterus and ovaries.     COMPARISON:  2021     FINDINGS:  Uterus:     Size: 7.7 x 4.2 x 6 cm     Endometrium is diffusely thickened measuring 0.6 cm.     Intramural leiomyoma anterior uterine body measures 1.7 cm, stable.     Subserosal leiomyoma posterior fundus measures 0.7 cm, stable.     Right ovary:     Not visualized     Left ovary:     Not visualized     Free Fluid:     None.     Impression:     Diffuse endometrial thickening measuring 0.6 cm.  This is considered abnormal in a postmenopausal female.  Correlation with endometrial sampling may be considered in further evaluating.     Stable presumed uterine leiomyomas.   .     Past Medical History:   Diagnosis Date    Allergy     Fibroids     Iron deficiency anemia, unspecified     Unspecified chronic bronchitis      Past Surgical History:   Procedure Laterality Date    BUNIONECTOMY Bilateral     COLONOSCOPY N/A 2023    Procedure: COLONOSCOPY;  Surgeon: Jacques Bautista MD;  Location: [facility];  Service: Endoscopy;  Laterality: N/A;    COLONOSCOPY W/ POLYPECTOMY  2023    TUBAL LIGATION       Family History   Problem Relation Age of Onset    Eczema Daughter     Allergic rhinitis Daughter     Breast cancer Maternal Aunt     Colon cancer Neg Hx     Ovarian cancer Neg Hx     Cancer Neg Hx     Uterine cancer Neg Hx     Cervical cancer Neg Hx      Social History     Tobacco Use    Smoking status:  "Never    Smokeless tobacco: Never   Substance Use Topics    Alcohol use: Yes     Comment: socially - monthly    Drug use: No     OB History    Para Term  AB Living   3 3 3         SAB IAB Ectopic Multiple Live Births                  # Outcome Date GA Lbr Rafa/2nd Weight Sex Delivery Anes PTL Lv   3 Term      Vag-Spont      2 Term      Vag-Spont      1 Term      Vag-Spont          /89   Pulse 75   Ht 5' 4" (1.626 m)   Wt 81.2 kg (179 lb)   BMI 30.73 kg/m²     ROS:    GENERAL: Denies weight gain or weight loss. Feeling well overall.   SKIN: Denies rash or lesions.   HEAD: Denies head injury or headache.   NODES: Denies enlarged lymph nodes.   CHEST: Denies chest pain or shortness of breath.   CARDIOVASCULAR: Denies palpitations or left sided chest pain.   ABDOMEN: No abdominal pain, constipation, diarrhea, nausea, vomiting or rectal bleeding.   URINARY: No frequency, dysuria, hematuria, or burning on urination.  REPRODUCTIVE: See HPI.   BREASTS: The patient performs breast self-examination and denies pain, lumps, or nipple discharge.   HEMATOLOGIC: No easy bruisability or excessive bleeding with the exception of menstrual cycles.  MUSCULOSKELETAL: Denies joint pain or swelling.   NEUROLOGIC: Denies syncope or weakness.   PSYCHIATRIC: Denies depression, anxiety or mood swings.    PHYSICAL EXAM:    APPEARANCE: Well nourished, well developed, in no acute distress.  AFFECT: WNL, alert and oriented x 3  SKIN: No acne or hirsutism  NECK: Neck symmetric without masses or thyromegaly  NODES: No inguinal, cervical, axillary, or femoral lymph node enlargement  CHEST: Good respiratory effect  ABDOMEN: Soft.  No tenderness or masses.  No hepatosplenomegaly.  No hernias.  PELVIC: Normal external genitalia without lesions.  Normal hair distribution.  Adequate perineal body, normal urethral meatus.  Vagina moist and well rugated without lesions or discharge.  Cervix pink, without lesions, discharge or " tenderness.  No significant cystocele or rectocele.  Bimanual exam shows uterus to be normal size, regular, mobile and nontender.  Adnexa without masses or tenderness.    EXTREMITIES: No edema.    ASSESSMENT & PLAN:    1. Postmenopause bleeding    - Full code; Standing  - Insert peripheral IV; Standing  - Oliver to Gravity; Standing  - Notify physician if BS > 180 for hysterectomy patients; Standing  - Chlorhexidine (CHG) 2% Wipes; Standing  - Notify Physician/Vital Signs Parameters Urine output less than 0.5mL/kg/hr (with indwelling catheter) or 30 mL/hr (without indwelling catheter) or blood glucose greater than 200 mg/dL; Standing  - Notify Physician - Potential Need of Opioid Reversal; Standing  - Diet NPO; Standing  - Chlorohexidine Gluconate Bath; Standing  - Pregnancy, urine rapid; Standing  - Place in Outpatient; Standing  - Place JACKIE hose; Standing  - Place sequential compression device; Standing    2. Postmenopausal bleeding        I have discussed the risks, benefits, indications, and alternatives of the procedure in detail.  The patient verbalizes her understanding.  All questions answered.  Consents signed.  The patient agrees to proceed to proceed as planned.

## 2023-12-05 NOTE — H&P (VIEW-ONLY)
History & Physical            OBGYN    C.C Pre-op Exam      HPI : Sraa Alcaraz is a 48 y.o. female  for evaluation and discussion of treatment options for Pre-op Exam  She had postmenopausal bleeding and thickened endometrial stripe on ultrasound.  Endometrial biopsy in the office was insufficient     EXAMINATION:  US PELVIS COMP WITH TRANSVAG NON-OB (XPD)     CLINICAL HISTORY:  Postmenopausal bleeding     TECHNIQUE:  Transabdominal sonography of the pelvis was performed, followed by transvaginal sonography to better evaluate the uterus and ovaries.     COMPARISON:  2021     FINDINGS:  Uterus:     Size: 7.7 x 4.2 x 6 cm     Endometrium is diffusely thickened measuring 0.6 cm.     Intramural leiomyoma anterior uterine body measures 1.7 cm, stable.     Subserosal leiomyoma posterior fundus measures 0.7 cm, stable.     Right ovary:     Not visualized     Left ovary:     Not visualized     Free Fluid:     None.     Impression:     Diffuse endometrial thickening measuring 0.6 cm.  This is considered abnormal in a postmenopausal female.  Correlation with endometrial sampling may be considered in further evaluating.     Stable presumed uterine leiomyomas.   .     Past Medical History:   Diagnosis Date    Allergy     Fibroids     Iron deficiency anemia, unspecified     Unspecified chronic bronchitis      Past Surgical History:   Procedure Laterality Date    BUNIONECTOMY Bilateral     COLONOSCOPY N/A 2023    Procedure: COLONOSCOPY;  Surgeon: Jacques Bautista MD;  Location: [facility];  Service: Endoscopy;  Laterality: N/A;    COLONOSCOPY W/ POLYPECTOMY  2023    TUBAL LIGATION       Family History   Problem Relation Age of Onset    Eczema Daughter     Allergic rhinitis Daughter     Breast cancer Maternal Aunt     Colon cancer Neg Hx     Ovarian cancer Neg Hx     Cancer Neg Hx     Uterine cancer Neg Hx     Cervical cancer Neg Hx      Social History     Tobacco Use    Smoking status:  "Never    Smokeless tobacco: Never   Substance Use Topics    Alcohol use: Yes     Comment: socially - monthly    Drug use: No     OB History    Para Term  AB Living   3 3 3         SAB IAB Ectopic Multiple Live Births                  # Outcome Date GA Lbr Rafa/2nd Weight Sex Delivery Anes PTL Lv   3 Term      Vag-Spont      2 Term      Vag-Spont      1 Term      Vag-Spont          /89   Pulse 75   Ht 5' 4" (1.626 m)   Wt 81.2 kg (179 lb)   BMI 30.73 kg/m²     ROS:    GENERAL: Denies weight gain or weight loss. Feeling well overall.   SKIN: Denies rash or lesions.   HEAD: Denies head injury or headache.   NODES: Denies enlarged lymph nodes.   CHEST: Denies chest pain or shortness of breath.   CARDIOVASCULAR: Denies palpitations or left sided chest pain.   ABDOMEN: No abdominal pain, constipation, diarrhea, nausea, vomiting or rectal bleeding.   URINARY: No frequency, dysuria, hematuria, or burning on urination.  REPRODUCTIVE: See HPI.   BREASTS: The patient performs breast self-examination and denies pain, lumps, or nipple discharge.   HEMATOLOGIC: No easy bruisability or excessive bleeding with the exception of menstrual cycles.  MUSCULOSKELETAL: Denies joint pain or swelling.   NEUROLOGIC: Denies syncope or weakness.   PSYCHIATRIC: Denies depression, anxiety or mood swings.    PHYSICAL EXAM:    APPEARANCE: Well nourished, well developed, in no acute distress.  AFFECT: WNL, alert and oriented x 3  SKIN: No acne or hirsutism  NECK: Neck symmetric without masses or thyromegaly  NODES: No inguinal, cervical, axillary, or femoral lymph node enlargement  CHEST: Good respiratory effect  ABDOMEN: Soft.  No tenderness or masses.  No hepatosplenomegaly.  No hernias.  PELVIC: Normal external genitalia without lesions.  Normal hair distribution.  Adequate perineal body, normal urethral meatus.  Vagina moist and well rugated without lesions or discharge.  Cervix pink, without lesions, discharge or " tenderness.  No significant cystocele or rectocele.  Bimanual exam shows uterus to be normal size, regular, mobile and nontender.  Adnexa without masses or tenderness.    EXTREMITIES: No edema.    ASSESSMENT & PLAN:    1. Postmenopause bleeding    - Full code; Standing  - Insert peripheral IV; Standing  - Oliver to Gravity; Standing  - Notify physician if BS > 180 for hysterectomy patients; Standing  - Chlorhexidine (CHG) 2% Wipes; Standing  - Notify Physician/Vital Signs Parameters Urine output less than 0.5mL/kg/hr (with indwelling catheter) or 30 mL/hr (without indwelling catheter) or blood glucose greater than 200 mg/dL; Standing  - Notify Physician - Potential Need of Opioid Reversal; Standing  - Diet NPO; Standing  - Chlorohexidine Gluconate Bath; Standing  - Pregnancy, urine rapid; Standing  - Place in Outpatient; Standing  - Place JACKIE hose; Standing  - Place sequential compression device; Standing    2. Postmenopausal bleeding        I have discussed the risks, benefits, indications, and alternatives of the procedure in detail.  The patient verbalizes her understanding.  All questions answered.  Consents signed.  The patient agrees to proceed to proceed as planned.

## 2023-12-27 ENCOUNTER — HOSPITAL ENCOUNTER (OUTPATIENT)
Facility: OTHER | Age: 48
Discharge: HOME | End: 2023-12-27
Attending: OBSTETRICS & GYNECOLOGY | Admitting: OBSTETRICS & GYNECOLOGY
Payer: COMMERCIAL

## 2023-12-27 ENCOUNTER — ANESTHESIA (OUTPATIENT)
Dept: SURGERY | Facility: OTHER | Age: 48
End: 2023-12-27
Payer: COMMERCIAL

## 2023-12-27 DIAGNOSIS — Z98.890 STATUS POST HYSTEROSCOPY: Primary | ICD-10-CM

## 2023-12-27 DIAGNOSIS — N95.0 POSTMENOPAUSE BLEEDING: ICD-10-CM

## 2023-12-27 DIAGNOSIS — N95.0 POSTMENOPAUSAL BLEEDING: ICD-10-CM

## 2023-12-27 LAB
B-HCG UR QL: NEGATIVE
CTP QC/QA: YES

## 2023-12-27 PROCEDURE — 25000003 PHARM REV CODE 250: Performed by: OBSTETRICS & GYNECOLOGY

## 2023-12-27 PROCEDURE — 88305 TISSUE EXAM BY PATHOLOGIST: CPT | Mod: 26,,, | Performed by: PATHOLOGY

## 2023-12-27 PROCEDURE — 25000242 PHARM REV CODE 250 ALT 637 W/ HCPCS: Performed by: ANESTHESIOLOGY

## 2023-12-27 PROCEDURE — 58558 HYSTEROSCOPY BIOPSY: CPT | Mod: ,,, | Performed by: OBSTETRICS & GYNECOLOGY

## 2023-12-27 PROCEDURE — 81025 URINE PREGNANCY TEST: CPT | Performed by: ANESTHESIOLOGY

## 2023-12-27 PROCEDURE — 88305 TISSUE EXAM BY PATHOLOGIST: CPT | Performed by: PATHOLOGY

## 2023-12-27 PROCEDURE — 63600175 PHARM REV CODE 636 W HCPCS: Performed by: ANESTHESIOLOGY

## 2023-12-27 PROCEDURE — 36000707: Performed by: OBSTETRICS & GYNECOLOGY

## 2023-12-27 PROCEDURE — 63600175 PHARM REV CODE 636 W HCPCS: Performed by: NURSE ANESTHETIST, CERTIFIED REGISTERED

## 2023-12-27 PROCEDURE — 36000706: Performed by: OBSTETRICS & GYNECOLOGY

## 2023-12-27 PROCEDURE — 37000008 HC ANESTHESIA 1ST 15 MINUTES: Performed by: OBSTETRICS & GYNECOLOGY

## 2023-12-27 PROCEDURE — D9220A PRA ANESTHESIA: ICD-10-PCS | Mod: CRNA,,, | Performed by: NURSE ANESTHETIST, CERTIFIED REGISTERED

## 2023-12-27 PROCEDURE — 25000003 PHARM REV CODE 250: Performed by: ANESTHESIOLOGY

## 2023-12-27 PROCEDURE — 25000003 PHARM REV CODE 250: Performed by: NURSE ANESTHETIST, CERTIFIED REGISTERED

## 2023-12-27 PROCEDURE — 94640 AIRWAY INHALATION TREATMENT: CPT

## 2023-12-27 PROCEDURE — 71000015 HC POSTOP RECOV 1ST HR: Performed by: OBSTETRICS & GYNECOLOGY

## 2023-12-27 PROCEDURE — 71000033 HC RECOVERY, INTIAL HOUR: Performed by: OBSTETRICS & GYNECOLOGY

## 2023-12-27 PROCEDURE — 71000016 HC POSTOP RECOV ADDL HR: Performed by: OBSTETRICS & GYNECOLOGY

## 2023-12-27 PROCEDURE — 37000009 HC ANESTHESIA EA ADD 15 MINS: Performed by: OBSTETRICS & GYNECOLOGY

## 2023-12-27 PROCEDURE — D9220A PRA ANESTHESIA: Mod: CRNA,,, | Performed by: NURSE ANESTHETIST, CERTIFIED REGISTERED

## 2023-12-27 PROCEDURE — 27201423 OPTIME MED/SURG SUP & DEVICES STERILE SUPPLY: Performed by: OBSTETRICS & GYNECOLOGY

## 2023-12-27 PROCEDURE — D9220A PRA ANESTHESIA: ICD-10-PCS | Mod: ANES,,, | Performed by: ANESTHESIOLOGY

## 2023-12-27 PROCEDURE — D9220A PRA ANESTHESIA: Mod: ANES,,, | Performed by: ANESTHESIOLOGY

## 2023-12-27 RX ORDER — MEPERIDINE HYDROCHLORIDE 25 MG/ML
12.5 INJECTION INTRAMUSCULAR; INTRAVENOUS; SUBCUTANEOUS ONCE AS NEEDED
Status: DISCONTINUED | OUTPATIENT
Start: 2023-12-27 | End: 2023-12-27 | Stop reason: HOSPADM

## 2023-12-27 RX ORDER — SODIUM CHLORIDE, SODIUM LACTATE, POTASSIUM CHLORIDE, CALCIUM CHLORIDE 600; 310; 30; 20 MG/100ML; MG/100ML; MG/100ML; MG/100ML
INJECTION, SOLUTION INTRAVENOUS CONTINUOUS
Status: DISCONTINUED | OUTPATIENT
Start: 2023-12-27 | End: 2023-12-27 | Stop reason: HOSPADM

## 2023-12-27 RX ORDER — OXYCODONE HYDROCHLORIDE 5 MG/1
5 TABLET ORAL
Status: DISCONTINUED | OUTPATIENT
Start: 2023-12-27 | End: 2023-12-27 | Stop reason: HOSPADM

## 2023-12-27 RX ORDER — PROPOFOL 10 MG/ML
VIAL (ML) INTRAVENOUS
Status: DISCONTINUED | OUTPATIENT
Start: 2023-12-27 | End: 2023-12-27

## 2023-12-27 RX ORDER — FENTANYL CITRATE 50 UG/ML
INJECTION, SOLUTION INTRAMUSCULAR; INTRAVENOUS
Status: DISCONTINUED | OUTPATIENT
Start: 2023-12-27 | End: 2023-12-27

## 2023-12-27 RX ORDER — SODIUM CHLORIDE 9 MG/ML
INJECTION, SOLUTION INTRAVENOUS CONTINUOUS
Status: DISCONTINUED | OUTPATIENT
Start: 2023-12-27 | End: 2023-12-27 | Stop reason: HOSPADM

## 2023-12-27 RX ORDER — LIDOCAINE HYDROCHLORIDE 10 MG/ML
0.5 INJECTION, SOLUTION EPIDURAL; INFILTRATION; INTRACAUDAL; PERINEURAL ONCE
Status: DISCONTINUED | OUTPATIENT
Start: 2023-12-27 | End: 2023-12-27 | Stop reason: HOSPADM

## 2023-12-27 RX ORDER — DEXAMETHASONE SODIUM PHOSPHATE 4 MG/ML
INJECTION, SOLUTION INTRA-ARTICULAR; INTRALESIONAL; INTRAMUSCULAR; INTRAVENOUS; SOFT TISSUE
Status: DISCONTINUED | OUTPATIENT
Start: 2023-12-27 | End: 2023-12-27

## 2023-12-27 RX ORDER — DIPHENHYDRAMINE HYDROCHLORIDE 50 MG/ML
25 INJECTION, SOLUTION INTRAMUSCULAR; INTRAVENOUS EVERY 6 HOURS PRN
Status: DISCONTINUED | OUTPATIENT
Start: 2023-12-27 | End: 2023-12-27 | Stop reason: HOSPADM

## 2023-12-27 RX ORDER — FAMOTIDINE 20 MG/1
20 TABLET, FILM COATED ORAL
Status: COMPLETED | OUTPATIENT
Start: 2023-12-27 | End: 2023-12-27

## 2023-12-27 RX ORDER — LIDOCAINE HYDROCHLORIDE 20 MG/ML
INJECTION INTRAVENOUS
Status: DISCONTINUED | OUTPATIENT
Start: 2023-12-27 | End: 2023-12-27

## 2023-12-27 RX ORDER — PROCHLORPERAZINE EDISYLATE 5 MG/ML
5 INJECTION INTRAMUSCULAR; INTRAVENOUS EVERY 30 MIN PRN
Status: DISCONTINUED | OUTPATIENT
Start: 2023-12-27 | End: 2023-12-27 | Stop reason: HOSPADM

## 2023-12-27 RX ORDER — PHENYLEPHRINE HYDROCHLORIDE 10 MG/ML
INJECTION INTRAVENOUS
Status: DISCONTINUED | OUTPATIENT
Start: 2023-12-27 | End: 2023-12-27

## 2023-12-27 RX ORDER — ACETAMINOPHEN 500 MG
1000 TABLET ORAL
Status: COMPLETED | OUTPATIENT
Start: 2023-12-27 | End: 2023-12-27

## 2023-12-27 RX ORDER — MIDAZOLAM HYDROCHLORIDE 1 MG/ML
INJECTION INTRAMUSCULAR; INTRAVENOUS
Status: DISCONTINUED | OUTPATIENT
Start: 2023-12-27 | End: 2023-12-27

## 2023-12-27 RX ORDER — SODIUM CHLORIDE 0.9 % (FLUSH) 0.9 %
3 SYRINGE (ML) INJECTION
Status: DISCONTINUED | OUTPATIENT
Start: 2023-12-27 | End: 2023-12-27 | Stop reason: HOSPADM

## 2023-12-27 RX ORDER — ALBUTEROL SULFATE 2.5 MG/.5ML
2.5 SOLUTION RESPIRATORY (INHALATION)
Status: COMPLETED | OUTPATIENT
Start: 2023-12-27 | End: 2023-12-27

## 2023-12-27 RX ORDER — HYDROMORPHONE HYDROCHLORIDE 2 MG/ML
0.4 INJECTION, SOLUTION INTRAMUSCULAR; INTRAVENOUS; SUBCUTANEOUS EVERY 5 MIN PRN
Status: DISCONTINUED | OUTPATIENT
Start: 2023-12-27 | End: 2023-12-27 | Stop reason: HOSPADM

## 2023-12-27 RX ORDER — ONDANSETRON 2 MG/ML
INJECTION INTRAMUSCULAR; INTRAVENOUS
Status: DISCONTINUED | OUTPATIENT
Start: 2023-12-27 | End: 2023-12-27

## 2023-12-27 RX ADMIN — DEXAMETHASONE SODIUM PHOSPHATE 4 MG: 4 INJECTION, SOLUTION INTRAMUSCULAR; INTRAVENOUS at 09:12

## 2023-12-27 RX ADMIN — ONDANSETRON HYDROCHLORIDE 4 MG: 2 INJECTION INTRAMUSCULAR; INTRAVENOUS at 09:12

## 2023-12-27 RX ADMIN — SODIUM CHLORIDE, SODIUM LACTATE, POTASSIUM CHLORIDE, AND CALCIUM CHLORIDE: 600; 310; 30; 20 INJECTION, SOLUTION INTRAVENOUS at 09:12

## 2023-12-27 RX ADMIN — MIDAZOLAM HYDROCHLORIDE 2 MG: 1 INJECTION, SOLUTION INTRAMUSCULAR; INTRAVENOUS at 09:12

## 2023-12-27 RX ADMIN — CARBOXYMETHYLCELLULOSE SODIUM 2 DROP: 2.5 SOLUTION/ DROPS OPHTHALMIC at 09:12

## 2023-12-27 RX ADMIN — PHENYLEPHRINE HYDROCHLORIDE 100 MCG: 10 INJECTION INTRAVENOUS at 09:12

## 2023-12-27 RX ADMIN — ACETAMINOPHEN 1000 MG: 500 TABLET ORAL at 08:12

## 2023-12-27 RX ADMIN — FENTANYL CITRATE 100 MCG: 50 INJECTION, SOLUTION INTRAMUSCULAR; INTRAVENOUS at 09:12

## 2023-12-27 RX ADMIN — LIDOCAINE HYDROCHLORIDE 75 MG: 20 INJECTION, SOLUTION INTRAVENOUS at 09:12

## 2023-12-27 RX ADMIN — ALBUTEROL SULFATE 2.5 MG: 2.5 SOLUTION RESPIRATORY (INHALATION) at 07:12

## 2023-12-27 RX ADMIN — PROPOFOL 200 MG: 10 INJECTION, EMULSION INTRAVENOUS at 09:12

## 2023-12-27 RX ADMIN — FAMOTIDINE 20 MG: 20 TABLET ORAL at 08:12

## 2023-12-27 NOTE — PLAN OF CARE
Sara Alcaraz has met all discharge criteria from Phase II. Vital Signs are stable, ambulating  without difficulty. Discharge instructions given, patient verbalized understanding. Discharged from facility via wheelchair in stable condition.

## 2023-12-27 NOTE — BRIEF OP NOTE
[facility] - Surgery ([place])  Brief Operative Note    Surgery Date: 12/27/2023     Surgeon(s) and Role:     * Zaida Land MD - Primary    Assisting Surgeon: None    Pre-op Diagnosis:  PMB (postmenopausal bleeding) [N95.0]  Thickened endometrium [R93.89]    Post-op Diagnosis:  Post-Op Diagnosis Codes:     * PMB (postmenopausal bleeding) [N95.0]     * Thickened endometrium [R93.89]    Procedure(s) (LRB):  HYSTEROSCOPY, WITH DILATION AND CURETTAGE OF UTERUS (N/A)    Anesthesia: General    Operative Findings: atrophic appearing endometrium, minimal lush tissue on superior aspect of endometrial cavity, bilateral tubal ostia visualized    Estimated Blood Loss: minimal          Specimens: 1 specimen - endometrial curettage  Specimen (24h ago, onward)      None              Discharge Note    OUTCOME: Patient tolerated treatment/procedure well without complication and is now ready for discharge.    DISPOSITION: Home or Self Care    FINAL DIAGNOSIS:  Status post hysteroscopy    FOLLOWUP: In clinic    DISCHARGE INSTRUCTIONS:    Discharge Procedure Orders   Diet Adult Regular     Pelvic Rest   Order Comments: Pelvic rest until follow up visit. Nothing in vagina -no sex, tampons, douching, etc.     No dressing needed     Notify your health care provider if you experience any of the following:  temperature >100.4     Notify your health care provider if you experience any of the following:  persistent nausea and vomiting or diarrhea     Notify your health care provider if you experience any of the following:  severe uncontrolled pain     Notify your health care provider if you experience any of the following:  redness, tenderness, or signs of infection (pain, swelling, redness, odor or green/yellow discharge around incision site)     Notify your health care provider if you experience any of the following:  difficulty breathing or increased cough     Notify your health care provider if you experience any of the  following:  severe persistent headache     Notify your health care provider if you experience any of the following:  worsening rash     Notify your health care provider if you experience any of the following:  persistent dizziness, light-headedness, or visual disturbances     Notify your health care provider if you experience any of the following:  increased confusion or weakness     Notify your health care provider if you experience any of the following:   Order Comments: Vaginal bleeding greater than 2 pads per 1 hour for 2 consecutive hours     Activity as tolerated

## 2023-12-27 NOTE — OP NOTE
Dictation #1  MRN:[id number]  [facility]:[id number]   Operative Report    Procedure: Dilation and Curettage, Hysteroscopy    Date of Surgery: 12/27/2023    Pre-Op Diagnosis: PMB (postmenopausal bleeding) [N95.0]  Thickened endometrium [R93.89]  Postmenopausal bleeding [N95.0]    Post-op Diagnosis: PMB (postmenopausal bleeding) [N95.0]  Thickened endometrium [R93.89]  Postmenopausal bleeding [N95.0]         Surgeon: Zaida Land MD    Assistant: none    Anesthesia: LMA    EBL: less than 50     IVF: lactated Ringer's          Total: 300cc    Urine Output: 75  Hysteroscopic fluid deficit 90cc    Specimen: endometrial curettage    Findings: Uterus sounded to 9, cervical os dilated to 18 by the preet Dilators.  Hysteroscopic findings include: Specimens obtained and sent to pathology.  Hemostasis obtained at the end of the procedure with pressure    Procedure in Detail:  The patient was taken to the Operating Room where general was obtained, the patient was placed in the lithotomy position, with her legs in the yellow fin stirrups.  The patient was then prepped and draped in the normal sterile fashion.  The weighted speculum was placed in the posterior aspect of the vagina and the right angle retractor was placed in the anterior aspect of the vagina.  The cervix was visualized and a single-tooth tenaculum was placed on the anterior aspect of the cervix.  The uterus was sounded to 9 cm with the uterine sound.    The hysteroscope was then white balanced and the line cleared of air, it was then inserted into the cervical os and the uterine cavity was directly visualized, atrophic appearing endometrium with minimal lush appearing tissue on superior aspect of the uterus were noted. Bilateral tubal ostia were visualized   The hysteroscope was then removed and currettage of the uterus of the uterus was performed and specimens were sent to pathology for evaluation.  The single tooth tenaculum was then removed and the cervix was  made hemostatic with pressure.  Sponge, lap and needle counts were correct x 2.    The patient tolerated the procedure well and was taken to recovery in stable condition.

## 2023-12-27 NOTE — TRANSFER OF CARE
"Anesthesia Transfer of Care Note    Patient: Sara Alcaraz    Procedure(s) Performed: Procedure(s) (LRB):  HYSTEROSCOPY, WITH DILATION AND CURETTAGE OF UTERUS (N/A)    Patient location: PACU    Anesthesia Type: general    Transport from OR: Transported from OR on 2-3 L/min O2 by NC with adequate spontaneous ventilation    Post pain: adequate analgesia    Post assessment: no apparent anesthetic complications    Post vital signs: stable    Level of consciousness: awake    Nausea/Vomiting: no nausea/vomiting    Complications: none    Transfer of care protocol was followed    Last vitals: Visit Vitals  /76 (BP Location: Right arm, Patient Position: Lying)   Pulse (!) 59   Temp 36.8 °C (98.2 °F) (Oral)   Resp 16   Ht 5' 4" (1.626 m)   Wt 80.3 kg (177 lb)   SpO2 98%   Breastfeeding No   BMI 30.38 kg/m²     "

## 2023-12-27 NOTE — ANESTHESIA PROCEDURE NOTES
Intubation    Date/Time: 12/27/2023 9:28 AM    Performed by: Dash Alicea CRNA  Authorized by: Elli Aquino MD    Intubation:     Induction:  Intravenous    Intubated:  Postinduction    Mask Ventilation:  Easy mask    Attempts:  1    Attempted By:  CRNA    Difficult Airway Encountered?: No      Complications:  None    Airway Device:  Supraglottic airway/LMA    Airway Device Size:  4.0    Style/Cuff Inflation:  Uncuffed    Secured at:  The lips    Placement Verified By:  Capnometry    Complicating Factors:  None    Findings Post-Intubation:  BS equal bilateral

## 2023-12-27 NOTE — DISCHARGE SUMMARY
"Discharge Summary  Gynecology      Admit Date: 2023    Discharge Date and Time: 2023     Attending Physician: Zaida Land MD    Principal Diagnoses: Status post hysteroscopy    Active Hospital Problems    Diagnosis  POA    *Status post hysteroscopy, dilation and curettage [Z98.890]  Not Applicable      Resolved Hospital Problems   No resolved problems to display.       Procedures: Procedure(s) (LRB):  HYSTEROSCOPY, WITH DILATION AND CURETTAGE OF UTERUS (N/A)    Discharged Condition: good    Hospital Course: Patient presented for scheduled procedure. Patient was passed back to OR for hysteroscopy, dilation and curettage. Please see OP note for further details. Tolerated procedure well and patient was taken to recovery in a stable condition. Prior to discharge patient was able to void, ambulate, tolerate PO and pain was well controlled with PO meds. Patient was given routine post-op instructions for which patient voiced understanding. Patient was subsequently discharged home.     Consults: None    Significant Diagnostic Studies:  No results for input(s): "WBC", "HGB", "HCT", "MCV", "PLT" in the last 168 hours.     Treatments:   1. Surgery as above    Disposition: Home or Self Care    Patient Instructions:   Current Discharge Medication List        CONTINUE these medications which have NOT CHANGED    Details   albuterol (PROVENTIL/VENTOLIN HFA) 90 mcg/actuation inhaler 2 puffs every 4 (four) hours as needed.      cetirizine (ZYRTEC) 10 MG tablet Take 10 mg by mouth once daily.      fluticasone propionate (FLONASE) 50 mcg/actuation nasal spray 1 spray (50 mcg total) by Each Nostril route 2 (two) times daily as needed for Rhinitis.  Qty: 15 g, Refills: 0      ketoconazole (NIZORAL) 2 % cream SMARTSI Application Topical 1 to 2 Times Daily      triamcinolone acetonide 0.1% (KENALOG) 0.1 % cream SMARTSIG:sparingly Topical Twice Daily      allerg xt,D.farinae-D.pteronys (ODACTRA) 12 SQ-HDM Subl " Place 1 tablet under the tongue once daily.  Qty: 30 tablet, Refills: 11    Associated Diagnoses: Allergic rhinitis due to house dust mite      EPINEPHrine (AUVI-Q) 0.3 mg/0.3 mL AtIn Inject 0.3 mLs (0.3 mg total) into the muscle once.  Qty: 0.3 mL, Refills: 3      HYDROcodone-acetaminophen (NORCO) 5-325 mg per tablet Take 1 tablet by mouth every 6 (six) hours as needed for Pain.  Qty: 6 tablet, Refills: 0    Comments: Quantity prescribed more than 7 day supply? No      ibuprofen (ADVIL,MOTRIN) 600 MG tablet Take 1 tablet (600 mg total) by mouth every 6 (six) hours as needed for Pain.  Qty: 20 tablet, Refills: 0      nystatin (MYCOSTATIN) powder SMARTSIG:Packet(s) Topical Twice Daily             Discharge Procedure Orders   Diet Adult Regular     Pelvic Rest   Order Comments: Pelvic rest until follow up visit. Nothing in vagina -no sex, tampons, douching, etc.     No dressing needed     Notify your health care provider if you experience any of the following:  temperature >100.4     Notify your health care provider if you experience any of the following:  persistent nausea and vomiting or diarrhea     Notify your health care provider if you experience any of the following:  severe uncontrolled pain     Notify your health care provider if you experience any of the following:  redness, tenderness, or signs of infection (pain, swelling, redness, odor or green/yellow discharge around incision site)     Notify your health care provider if you experience any of the following:  difficulty breathing or increased cough     Notify your health care provider if you experience any of the following:  severe persistent headache     Notify your health care provider if you experience any of the following:  worsening rash     Notify your health care provider if you experience any of the following:  persistent dizziness, light-headedness, or visual disturbances     Notify your health care provider if you experience any of the  following:  increased confusion or weakness     Notify your health care provider if you experience any of the following:   Order Comments: Vaginal bleeding greater than 2 pads per 1 hour for 2 consecutive hours     Activity as tolerated        Follow-up Information       Zaida Land MD Follow up in 6 week(s).    Specialties: Obstetrics, Obstetrics and Gynecology  Why: Postpartum  Contact information:  [address] [id number] [phone]                           Apurva Fernandes MD/MPH  OB/GYN PGY-3  [facility]

## 2023-12-27 NOTE — INTERVAL H&P NOTE
Sara Alcaraz is 48 y.o.  presenting for scheduled Hysteroscopy D&C for the treatment of postmenopausal bleeding.         General: NAD, alert, oriented, cooperative  HEENT: NCAT, EOM grossly intact  Lungs: Normal WOB  Heart: regular rate  Abdomen: soft, nondistended, nontender, no rebound or guarding    Consents in chart. Pre-operative heparin not indicated. All questions answered and concerns addressed. To OR for planned procedure.      The patient has been examined and the H&P has been reviewed:    I concur with the findings and no changes have occurred since H&P was written.    Surgery risks, benefits and alternative options discussed and understood by patient/family.      Naina Quinteros MD  Obstetrics and Gynecology - PGY1

## 2023-12-27 NOTE — ANESTHESIA POSTPROCEDURE EVALUATION
Anesthesia Post Evaluation    Patient: Sara Alcaraz    Procedure(s) Performed: Procedure(s) (LRB):  HYSTEROSCOPY, WITH DILATION AND CURETTAGE OF UTERUS (N/A)    Final Anesthesia Type: general      Patient location during evaluation: PACU  Patient participation: Yes- Able to Participate  Level of consciousness: awake and alert  Post-procedure vital signs: reviewed and stable  Pain management: adequate  Airway patency: patent    PONV status at discharge: No PONV  Anesthetic complications: no      Cardiovascular status: blood pressure returned to baseline and stable  Respiratory status: unassisted, spontaneous ventilation and room air  Hydration status: euvolemic  Follow-up not needed.              Vitals Value Taken Time   /69 12/27/23 1055   Temp 36.4 °C (97.5 °F) 12/27/23 1055   Pulse 59 12/27/23 1055   Resp 16 12/27/23 1055   SpO2 100 % 12/27/23 1055         Event Time   Out of Recovery 10:52:00         Pain/Rita Score: Pain Rating Prior to Med Admin: 0 (12/27/2023  8:20 AM)  Rita Score: 10 (12/27/2023 10:55 AM)

## 2023-12-28 VITALS
RESPIRATION RATE: 16 BRPM | HEIGHT: 64 IN | SYSTOLIC BLOOD PRESSURE: 109 MMHG | HEART RATE: 74 BPM | WEIGHT: 177 LBS | TEMPERATURE: 98 F | DIASTOLIC BLOOD PRESSURE: 64 MMHG | BODY MASS INDEX: 30.22 KG/M2 | OXYGEN SATURATION: 96 %

## 2023-12-29 ENCOUNTER — OFFICE VISIT (OUTPATIENT)
Dept: VASCULAR SURGERY | Facility: CLINIC | Age: 48
End: 2023-12-29
Payer: COMMERCIAL

## 2023-12-29 ENCOUNTER — TELEPHONE (OUTPATIENT)
Dept: OBSTETRICS AND GYNECOLOGY | Facility: CLINIC | Age: 48
End: 2023-12-29
Payer: COMMERCIAL

## 2023-12-29 VITALS — BODY MASS INDEX: 30.22 KG/M2 | HEIGHT: 64 IN | WEIGHT: 177 LBS

## 2023-12-29 DIAGNOSIS — I87.2 VENOUS INSUFFICIENCY: Primary | ICD-10-CM

## 2023-12-29 PROCEDURE — 1159F MED LIST DOCD IN RCRD: CPT | Mod: CPTII,S$GLB,, | Performed by: SURGERY

## 2023-12-29 PROCEDURE — 99214 OFFICE O/P EST MOD 30 MIN: CPT | Mod: S$GLB,,, | Performed by: SURGERY

## 2023-12-29 PROCEDURE — 3008F PR BODY MASS INDEX (BMI) DOCUMENTED: ICD-10-PCS | Mod: CPTII,S$GLB,, | Performed by: SURGERY

## 2023-12-29 PROCEDURE — 1159F PR MEDICATION LIST DOCUMENTED IN MEDICAL RECORD: ICD-10-PCS | Mod: CPTII,S$GLB,, | Performed by: SURGERY

## 2023-12-29 PROCEDURE — 3008F BODY MASS INDEX DOCD: CPT | Mod: CPTII,S$GLB,, | Performed by: SURGERY

## 2023-12-29 PROCEDURE — 99214 PR OFFICE/OUTPT VISIT, EST, LEVL IV, 30-39 MIN: ICD-10-PCS | Mod: S$GLB,,, | Performed by: SURGERY

## 2023-12-29 NOTE — PROGRESS NOTES
Demetrio Vega MD, RPVI                                 [facility]                           [facility]                             12/29/2023    HPI:  Sara Alcaraz is a 48 y.o. female with   Patient Active Problem List   Diagnosis    Pain of right thumb    Decreased range of motion of right thumb    Varicose veins of bilateral lower extremities with pain    Venous insufficiency    Left leg pain    Viral URI with cough    Pharyngitis    Status post hysteroscopy, dilation and curettage    being managed by PCP and specialists who is here today for evaluation of RLE edema.  Patient states location is RLE occurring for yrs.  Associated signs and symptoms include pain.  Quality is heavy and severity is 6/10.  Symptoms began yrs ago.  Alleviating factors include elevation.  Worsening factors include dependency.  Patient has been wearing compression stockings for greater than 3 months.  No FH of venous disease.  Symptoms do limit patient's functional status and daily activities.  no DVT history.  no venous interventions.  no low sodium diet.  no excessive water intake.    Migraine with aura: no  PFO/ASD/right to left shunt: no  Pregnant: no  Breastfeeding: no    no MI  no Stroke  Tobacco use: denies    Past Medical History:   Diagnosis Date    Allergy     Fibroids     Iron deficiency anemia, unspecified     Unspecified chronic bronchitis      Past Surgical History:   Procedure Laterality Date    BUNIONECTOMY Bilateral     COLONOSCOPY N/A 8/16/2023    Procedure: COLONOSCOPY;  Surgeon: Jacques Bautista MD;  Location: [facility];  Service: Endoscopy;  Laterality: N/A;    COLONOSCOPY W/ POLYPECTOMY  08/16/2023    HYSTEROSCOPY WITH DILATION AND CURETTAGE OF UTERUS N/A 12/27/2023    Procedure: HYSTEROSCOPY, WITH DILATION AND CURETTAGE OF UTERUS;  Surgeon: Zaida Land MD;  Location: [facility] OR;  Service: OB/GYN;  Laterality: N/A;    TUBAL LIGATION       Family History   Problem  Relation Age of Onset    Eczema Daughter     Allergic rhinitis Daughter     Breast cancer Maternal Aunt     Colon cancer Neg Hx     Ovarian cancer Neg Hx     Cancer Neg Hx     Uterine cancer Neg Hx     Cervical cancer Neg Hx      Social History     Socioeconomic History    Marital status:    Tobacco Use    Smoking status: Never    Smokeless tobacco: Never   Substance and Sexual Activity    Alcohol use: Yes     Comment: socially - monthly    Drug use: No    Sexual activity: Yes     Partners: Male     Birth control/protection: See Surgical Hx, Condom     Comment: occasionaly uses condoms       Current Outpatient Medications:     albuterol (PROVENTIL/VENTOLIN HFA) 90 mcg/actuation inhaler, 2 puffs every 4 (four) hours as needed., Disp: , Rfl:     cetirizine (ZYRTEC) 10 MG tablet, Take 10 mg by mouth once daily., Disp: , Rfl:     fluticasone propionate (FLONASE) 50 mcg/actuation nasal spray, 1 spray (50 mcg total) by Each Nostril route 2 (two) times daily as needed for Rhinitis., Disp: 15 g, Rfl: 0    HYDROcodone-acetaminophen (NORCO) 5-325 mg per tablet, Take 1 tablet by mouth every 6 (six) hours as needed for Pain., Disp: 6 tablet, Rfl: 0    ibuprofen (ADVIL,MOTRIN) 600 MG tablet, Take 1 tablet (600 mg total) by mouth every 6 (six) hours as needed for Pain., Disp: 20 tablet, Rfl: 0    ketoconazole (NIZORAL) 2 % cream, SMARTSI Application Topical 1 to 2 Times Daily, Disp: , Rfl:     nystatin (MYCOSTATIN) powder, SMARTSIG:Packet(s) Topical Twice Daily, Disp: , Rfl:     triamcinolone acetonide 0.1% (KENALOG) 0.1 % cream, SMARTSIG:sparingly Topical Twice Daily, Disp: , Rfl:     allerg xt,D.farinae-D.pteronys (ODACTRA) 12 SQ-HDM Subl, Place 1 tablet under the tongue once daily. (Patient not taking: Reported on 2023), Disp: 30 tablet, Rfl: 11    EPINEPHrine (AUVI-Q) 0.3 mg/0.3 mL AtIn, Inject 0.3 mLs (0.3 mg total) into the muscle once., Disp: 0.3 mL, Rfl: 3    REVIEW OF SYSTEMS:  General: No fevers or chills;  "ENT: No sore throat; Allergy and Immunology: no persistent infections; Hematological and Lymphatic: No history of bleeding or easy bruising; Endocrine: negative; Respiratory: no cough, shortness of breath, or wheezing; Cardiovascular: no chest pain or dyspnea on exertion; Gastrointestinal: no abdominal pain/back, change in bowel habits, or bloody stools; Genito-Urinary: no dysuria, trouble voiding, or hematuria; Musculoskeletal: edema; Neurological: no TIA or stroke symptoms; Psychiatric: no nervousness, anxiety or depression.    PHYSICAL EXAM:                General appearance:  Alert, well-appearing, and in no distress.  Oriented to person, place, and time                    Neurological: Normal speech, no focal findings noted; CN II - XII grossly intact. RLE with sensation to light touch, LLE with sensation to light touch.            Musculoskeletal: Digits/nail without cyanosis/clubbing.  Strength 5/5 BLE.                    Neck: Supple, no significant adenopathy                  Chest:  No wheezes, symmetric air entry. No use of accessory muscles               Cardiac: Normal rate and regular rhythm            Abdomen: Soft, nontender, nondistended      Extremities:   2+ R DP pulse, 2+ L DP pulse      1+ RLE edema, 1+ LLE edema    Skin:  RLE no ulcer; LLE no ulcer      RLE no spider veins, LLE no spider veins      RLE no varicose veins, LLE no varicose veins    CEAP 3/3    VCSS 7    LAB RESULTS:  No results found for: "CBC"  No results found for: "LABPROT", "INR"  No results found for: "NA", "K", "CL", "CO2", "GLU", "BUN", "CREATININE", "CALCIUM", "ANIONGAP", "EGFRNONAA"  No results found for: "WBC", "RBC", "HGB", "HCT", "MCV", "MCH", "MCHC", "RDW", "PLT", "MPV", "GRAN", "LYMPH", "MONO", "EOS", "BASO", "EOSINOPHIL", "BASOPHIL", "DIFFMETHOD"  .No results found for: "HGBA1C"    IMAGING:  All pertinent imaging has been reviewed and interpreted independently.    Venous US   Lower Extremity Veins "   =================     Rt CFV:    complete compression, complete color fill   Rt prox femoral V: complete compression, complete color fill   Rt mid femoral V:  complete compression, complete color fill   Rt distal femoral V:   complete compression, complete color fill   Rt mid popliteal V:    complete compression, complete color fill   Rt post tibial V:  complete compression, complete color fill   Rt peroneal V: complete compression, complete color fill   Rt GSV:    complete compression, complete color fill, significant reflux   Rt SSV:    complete compression, complete color fill   Add rt lower V details:    complete compression, complete color fill   Lt CFV:    complete compression, complete color fill   Lt prox femoral V: complete compression, complete color fill   Lt mid femoral V:  complete compression, complete color fill   Lt distal femoral V:   complete compression, complete color fill   Lt mid popliteal V:    complete compression, complete color fill   Lt post tibial V:  complete compression, complete color fill   Lt peroneal V: complete compression, complete color fill   Lt GSV:    complete compression, complete color fill   Lt SSV:    complete compression, complete color fill   Add lt lower V details:    complete compression, complete color fill   Additional lower vein: Saphenofemoral Junction   Other: No reflux noted in the Accessory Veins     Lower Extremity Vein Mapping   =======================     Rt prox GSV thigh AP diam  3.7 mm   Rt mid GSV thigh AP diam   3.9 mm   Rt distal GSV thigh AP diam    3.2 mm   Rt GSV knee AP diam    2.7 mm   Rt prox GSV calf AP diam   2.3 mm   Rt mid GSV calf AP diam    2.5 mm     Comment   ========     -The diagnostic criteria used in our lab: Significant reflux is present when retrograde flow is noted both in the vein and at the   saphenofemoral junction (SFJ) or saphenopopliteal junction (SPJ)for 500 milliseconds (.5 second) following provocative maneuvers   when  the patient is in the reverse trendelenburg position.     Impression   =========     Right Leg: Duplex imaging of the right lower extremity veins demonstrates patent and compressible veins.   There is no evidence of a venous thrombosis in the deep or superficial veins. Branching is noted throughout the course of the GSV.   Significant reflux noted in the right GSV including the Saphenofemoral Junction (SFJ). No significant reflux noted in the SSV or   accessory vein.     Left Leg: Duplex imaging of the left lower extremity veins demonstrates patent and compressible veins.   There is no evidence of a venous thrombosis in the deep or superficial veins.   No significant reflux noted in the left GSV including the Saphenofemoral Junction (SFJ) or the SSV including the Saphenopopliteal   Junction (SPJ). No reflux noted in the Accessory Vein.       DATE OF SERVICE: 07/03/2023     IMP/PLAN:  48 y.o. female with   Patient Active Problem List   Diagnosis    Pain of right thumb    Decreased range of motion of right thumb    Varicose veins of bilateral lower extremities with pain    Venous insufficiency    Left leg pain    Viral URI with cough    Pharyngitis    Status post hysteroscopy, dilation and curettage    being managed by PCP and specialists who is here today for evaluation of RLE edema and pain.    -rec R GSV EVLT  -recommend compression with Rx stockings, elevation, dietary changes associated with water and sodium intake discussed at length with patient  -Exercise     I spent 12 minutes evaluating this patient and greater than 50% of the time was spent counseling, coordinator care and discussing the plan of care.  All questions were answered and patient stated understanding with agreement with the above treatment plan.    Demetrio Vega MD [facility]  Vascular and Endovascular Surgery

## 2023-12-29 NOTE — TELEPHONE ENCOUNTER
Called pt to check on her after surgery. Pt states she is doing good just some mild cramps. Advised pt to let us know if she needs anything

## 2023-12-31 LAB
COMMENT: NORMAL
FINAL PATHOLOGIC DIAGNOSIS: NORMAL
GROSS: NORMAL
Lab: NORMAL

## 2024-01-02 DIAGNOSIS — I87.2 VENOUS INSUFFICIENCY: Primary | ICD-10-CM

## 2024-03-18 ENCOUNTER — HOSPITAL ENCOUNTER (OUTPATIENT)
Dept: RADIOLOGY | Facility: OTHER | Age: 49
Discharge: HOME | End: 2024-03-18
Attending: ORTHOPAEDIC SURGERY
Payer: COMMERCIAL

## 2024-03-18 DIAGNOSIS — M79.641 RIGHT HAND PAIN: ICD-10-CM

## 2024-03-18 PROCEDURE — 73130 X-RAY EXAM OF HAND: CPT | Mod: TC,FY,RT

## 2024-03-18 PROCEDURE — 73130 X-RAY EXAM OF HAND: CPT | Mod: 26,RT,, | Performed by: RADIOLOGY

## 2024-03-21 ENCOUNTER — PATIENT MESSAGE (OUTPATIENT)
Dept: ORTHOPEDICS | Facility: CLINIC | Age: 49
End: 2024-03-21
Payer: COMMERCIAL

## 2024-03-21 DIAGNOSIS — G89.18 PAIN ASSOCIATED WITH SURGICAL PROCEDURE: Primary | ICD-10-CM

## 2024-03-21 DIAGNOSIS — F41.9 ANXIETY DUE TO INVASIVE PROCEDURE: ICD-10-CM

## 2024-03-21 RX ORDER — ALPRAZOLAM 0.5 MG/1
TABLET ORAL
Qty: 2 TABLET | Refills: 0 | Status: SHIPPED | OUTPATIENT
Start: 2024-03-21

## 2024-03-21 RX ORDER — MELOXICAM 7.5 MG/1
7.5 TABLET ORAL 2 TIMES DAILY
Qty: 14 TABLET | Refills: 0 | Status: SHIPPED | OUTPATIENT
Start: 2024-03-21 | End: 2024-03-28

## 2024-03-22 ENCOUNTER — PATIENT MESSAGE (OUTPATIENT)
Dept: VASCULAR SURGERY | Facility: CLINIC | Age: 49
End: 2024-03-22
Payer: COMMERCIAL

## 2024-03-22 DIAGNOSIS — M25.511 CHRONIC RIGHT SHOULDER PAIN: Primary | ICD-10-CM

## 2024-03-22 DIAGNOSIS — G89.29 CHRONIC RIGHT SHOULDER PAIN: Primary | ICD-10-CM

## 2024-03-22 DIAGNOSIS — Z98.890 STATUS POST ABLATION OF INCOMPETENT VEIN USING LASER: Primary | ICD-10-CM

## 2024-03-25 ENCOUNTER — PROCEDURE VISIT (OUTPATIENT)
Dept: VASCULAR SURGERY | Facility: CLINIC | Age: 49
End: 2024-03-25
Payer: COMMERCIAL

## 2024-03-25 VITALS
WEIGHT: 177 LBS | HEART RATE: 69 BPM | SYSTOLIC BLOOD PRESSURE: 117 MMHG | DIASTOLIC BLOOD PRESSURE: 74 MMHG | BODY MASS INDEX: 30.39 KG/M2

## 2024-03-25 DIAGNOSIS — Z91.89 AT HIGH RISK FOR PAIN FROM PROCEDURE: Primary | ICD-10-CM

## 2024-03-25 DIAGNOSIS — I87.2 VENOUS INSUFFICIENCY: ICD-10-CM

## 2024-03-25 PROCEDURE — 36478 ENDOVENOUS LASER 1ST VEIN: CPT | Mod: RT,S$GLB,, | Performed by: SURGERY

## 2024-03-25 RX ORDER — LIDOCAINE HYDROCHLORIDE 10 MG/ML
5 INJECTION INFILTRATION; PERINEURAL
Status: SHIPPED | OUTPATIENT
Start: 2024-03-25

## 2024-03-25 NOTE — PROCEDURES
Sara Alcaraz  03/25/2024     Pre-Procedure Diagnosis: Right greater saphenous vein reflux; significant superficial venous insufficiency    Post-Procedure Diagnsosis: Same    Procedure: Laser endovenous ablation of the right greater saphenous vein    Surgeon: Demetrio Vega MD    Anesthesia: Local    The skin of the leg was prepped and draped in sterile fashion.  Ultrasound-guidance was used throughout the procedure with a portable duplex ultrasound machine.  The GSV was cannulated using a micro-puncture system.  An 0.035 wire J-tip followed by a 4-Fr Angiodynamics sheath was placed throughout the GSV.   Using tumescent anesthesia, the entire sheathed portion of the GSV was anesthesized keeping the vein at least one cm from the skin; Klein pump was used.  Position of the tip of the laser was then reconfirmed to be approximately 2 cm from the saphenofemoral junction.  The 1470 nm laser was then activated and the entire length of the vein was treated at ~ 49 J/cm.  The fiber and sheath were then removed intact.  Duplex confirmed occlusion of the GSV with continued patency of the common femoral vein.   The incision was closed with Steri-strips.   Sterile compression dressings and a compression stocking were applied and the patient was discharged to home in a satisfactory condition.    Cannulation site: calf    Sheath length: 47 cm    [employer] of power: 7 W    Laser time: 320 sec    Joules: 2246.3 J             Demetrio Vega MD   Vascular & Endovascular Surgery

## 2024-03-26 ENCOUNTER — HOSPITAL ENCOUNTER (OUTPATIENT)
Dept: RADIOLOGY | Facility: OTHER | Age: 49
Discharge: HOME | End: 2024-03-26
Attending: NURSE PRACTITIONER
Payer: COMMERCIAL

## 2024-03-26 DIAGNOSIS — Z12.31 ENCOUNTER FOR SCREENING MAMMOGRAM FOR BREAST CANCER: ICD-10-CM

## 2024-03-26 PROCEDURE — 77067 SCR MAMMO BI INCL CAD: CPT | Mod: TC

## 2024-03-26 PROCEDURE — 77067 SCR MAMMO BI INCL CAD: CPT | Mod: 26,,, | Performed by: RADIOLOGY

## 2024-03-26 PROCEDURE — 77063 BREAST TOMOSYNTHESIS BI: CPT | Mod: 26,,, | Performed by: RADIOLOGY

## 2024-03-27 ENCOUNTER — HOSPITAL ENCOUNTER (OUTPATIENT)
Dept: RADIOLOGY | Facility: OTHER | Age: 49
Discharge: HOME | End: 2024-03-27
Attending: SURGERY
Payer: COMMERCIAL

## 2024-03-27 DIAGNOSIS — Z98.890 STATUS POST ABLATION OF INCOMPETENT VEIN USING LASER: ICD-10-CM

## 2024-03-27 PROCEDURE — 93971 EXTREMITY STUDY: CPT | Mod: 26,RT,, | Performed by: RADIOLOGY

## 2024-03-27 PROCEDURE — 93970 EXTREMITY STUDY: CPT | Mod: TC,RT

## 2024-03-28 ENCOUNTER — TELEPHONE (OUTPATIENT)
Dept: VASCULAR SURGERY | Facility: CLINIC | Age: 49
End: 2024-03-28
Payer: COMMERCIAL

## 2024-03-28 NOTE — TELEPHONE ENCOUNTER
Notified pt her post procedure US WNL. Pt denies any issues w/access site, and stated she is taking Meloxicam and wearing compression stockings as directed.

## 2024-04-01 ENCOUNTER — TELEPHONE (OUTPATIENT)
Dept: UROLOGY | Facility: CLINIC | Age: 49
End: 2024-04-01
Payer: COMMERCIAL

## 2024-04-01 NOTE — TELEPHONE ENCOUNTER
Spoke with patient and informed that her ultrasound was normal, no blood clot, and ablated GSV. Pt states she still has a little numbness around the access site, and tenderness in the upper thigh. Informed that is normal and to make sure she continues to wear her compression stockings. Anticipate at least 2 weeks of tenderness. Informed to call if numbness does not resolve by one month out so that we can schedule her to be seen by Dr. Vega

## 2024-04-10 ENCOUNTER — TELEPHONE (OUTPATIENT)
Dept: ORTHOPEDICS | Facility: CLINIC | Age: 49
End: 2024-04-10
Payer: COMMERCIAL

## 2024-04-16 ENCOUNTER — HOSPITAL ENCOUNTER (OUTPATIENT)
Dept: RADIOLOGY | Facility: OTHER | Age: 49
Discharge: HOME | End: 2024-04-16
Attending: ORTHOPAEDIC SURGERY
Payer: COMMERCIAL

## 2024-04-16 ENCOUNTER — OFFICE VISIT (OUTPATIENT)
Dept: ORTHOPEDICS | Facility: CLINIC | Age: 49
End: 2024-04-16
Payer: COMMERCIAL

## 2024-04-16 VITALS — HEIGHT: 64 IN | BODY MASS INDEX: 30.22 KG/M2 | WEIGHT: 177 LBS

## 2024-04-16 DIAGNOSIS — M75.41 SUBACROMIAL IMPINGEMENT OF RIGHT SHOULDER: ICD-10-CM

## 2024-04-16 DIAGNOSIS — M75.21 TENDINITIS OF LONG HEAD OF BICEPS BRACHII OF RIGHT SHOULDER: Primary | ICD-10-CM

## 2024-04-16 DIAGNOSIS — M25.511 CHRONIC RIGHT SHOULDER PAIN: ICD-10-CM

## 2024-04-16 DIAGNOSIS — G89.29 CHRONIC RIGHT SHOULDER PAIN: ICD-10-CM

## 2024-04-16 PROCEDURE — 1159F MED LIST DOCD IN RCRD: CPT | Mod: CPTII,S$GLB,, | Performed by: ORTHOPAEDIC SURGERY

## 2024-04-16 PROCEDURE — 99999 PR PBB SHADOW E&M-EST. PATIENT-LVL III: CPT | Mod: PBBFAC,,, | Performed by: ORTHOPAEDIC SURGERY

## 2024-04-16 PROCEDURE — 3008F BODY MASS INDEX DOCD: CPT | Mod: CPTII,S$GLB,, | Performed by: ORTHOPAEDIC SURGERY

## 2024-04-16 PROCEDURE — 20610 DRAIN/INJ JOINT/BURSA W/O US: CPT | Mod: RT,S$GLB,, | Performed by: ORTHOPAEDIC SURGERY

## 2024-04-16 PROCEDURE — 73030 X-RAY EXAM OF SHOULDER: CPT | Mod: TC,FY,RT

## 2024-04-16 PROCEDURE — 99214 OFFICE O/P EST MOD 30 MIN: CPT | Mod: 25,S$GLB,, | Performed by: ORTHOPAEDIC SURGERY

## 2024-04-16 PROCEDURE — 73030 X-RAY EXAM OF SHOULDER: CPT | Mod: 26,RT,, | Performed by: RADIOLOGY

## 2024-04-16 RX ADMIN — TRIAMCINOLONE ACETONIDE 80 MG: 40 INJECTION, SUSPENSION INTRA-ARTICULAR; INTRAMUSCULAR at 10:04

## 2024-04-16 NOTE — PROGRESS NOTES
H&P  Orthopaedics    SUBJECTIVE:   Chief Complaint: R shoulder pain    History of Present Illness:  Patient is a 49 y.o. female who presents with several year history of atraumatic right shoulder pain.  Describes it primarily over the anterolateral aspect of the shoulder that is sharp with intermittent burning pain that radiates shelter down the upper arm.  Attempted a formal course of physical therapy without much improvement.    She reports that the pain is worse with overhead activity. It also bothers her at night. Denies numbness or neck pain.    No hx of HEP, bracing, CSI, or Sx to RUE. They are RHD. 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    Of note, she was last seen 1 year ago in our clinic for a right trigger thumb for which she endorses no further triggering with only mild pain.  Her primary complaint today is her right shoulder.    Scheduled Meds:  Current Outpatient Medications   Medication Sig Dispense Refill    albuterol (PROVENTIL/VENTOLIN HFA) 90 mcg/actuation inhaler 2 puffs every 4 (four) hours as needed.      allerg xt,D.farinae-[place] (ODACTRA) 12 SQ-HDM Subl Place 1 tablet under the tongue once daily. 30 tablet 11    ALPRAZolam (XANAX) 0.5 MG tablet Take one tablet by mouth 1 hour before procedure and bring other tablet with you to the procedure. 2 tablet 0    cetirizine (ZYRTEC) 10 MG tablet Take 10 mg by mouth once daily.      fluticasone propionate (FLONASE) 50 mcg/actuation nasal spray 1 spray (50 mcg total) by Each Nostril route 2 (two) times daily as needed for Rhinitis. 15 g 0    HYDROcodone-acetaminophen (NORCO) 5-325 mg per tablet Take 1 tablet by mouth every 6 (six) hours as needed for Pain. 6 tablet 0    ibuprofen (ADVIL,MOTRIN) 600 MG tablet Take 1 tablet (600 mg total) by mouth every 6 (six) hours as needed for Pain. 20 tablet 0    ketoconazole (NIZORAL) 2 % cream SMARTSI Application Topical 1 to 2 Times Daily      nystatin (MYCOSTATIN) powder SMARTSIG:Packet(s) Topical Twice Daily       triamcinolone acetonide 0.1% (KENALOG) 0.1 % cream SMARTSIG:sparingly Topical Twice Daily      EPINEPHrine (AUVI-Q) 0.3 mg/0.3 mL AtIn Inject 0.3 mLs (0.3 mg total) into the muscle once. 0.3 mL 3     Current Facility-Administered Medications   Medication Dose Route Frequency Provider Last Rate Last Admin    LIDOcaine HCL 10 mg/ml (1%) injection 5 mL  5 mL Other 1 time in Clinic/HOD Demetrio Vega MD        LIDOcaine-EPINEPHrine 1%-1:100,000 30 mL, LIDOcaine HCL 10 mg/ml (1%) 20 mL, sodium bicarbonate 10 mL in sodium chloride 0.9% 500 mL solution   MISCELLANEOUS 1 time in Clinic/HOD Demetrio Vega MD         Continuous Infusions:  Current Outpatient Medications   Medication Sig Dispense Refill    albuterol (PROVENTIL/VENTOLIN HFA) 90 mcg/actuation inhaler 2 puffs every 4 (four) hours as needed.      allerg xt,D.farinae-DFantasmapteronys (ODACTRA) 12 SQ-HDM Subl Place 1 tablet under the tongue once daily. 30 tablet 11    ALPRAZolam (XANAX) 0.5 MG tablet Take one tablet by mouth 1 hour before procedure and bring other tablet with you to the procedure. 2 tablet 0    cetirizine (ZYRTEC) 10 MG tablet Take 10 mg by mouth once daily.      fluticasone propionate (FLONASE) 50 mcg/actuation nasal spray 1 spray (50 mcg total) by Each Nostril route 2 (two) times daily as needed for Rhinitis. 15 g 0    HYDROcodone-acetaminophen (NORCO) 5-325 mg per tablet Take 1 tablet by mouth every 6 (six) hours as needed for Pain. 6 tablet 0    ibuprofen (ADVIL,MOTRIN) 600 MG tablet Take 1 tablet (600 mg total) by mouth every 6 (six) hours as needed for Pain. 20 tablet 0    ketoconazole (NIZORAL) 2 % cream SMARTSI Application Topical 1 to 2 Times Daily      nystatin (MYCOSTATIN) powder SMARTSIG:Packet(s) Topical Twice Daily      triamcinolone acetonide 0.1% (KENALOG) 0.1 % cream SMARTSIG:sparingly Topical Twice Daily      EPINEPHrine (AUVI-Q) 0.3 mg/0.3 mL AtIn Inject 0.3 mLs (0.3 mg total) into the muscle once. 0.3 mL 3      Current Facility-Administered Medications   Medication Dose Route Frequency Provider Last Rate Last Admin    LIDOcaine HCL 10 mg/ml (1%) injection 5 mL  5 mL Other 1 time in Clinic/HOD Demetrio Vega MD        LIDOcaine-EPINEPHrine 1%-1:100,000 30 mL, LIDOcaine HCL 10 mg/ml (1%) 20 mL, sodium bicarbonate 10 mL in sodium chloride 0.9% 500 mL solution   MISCELLANEOUS 1 time in Clinic/HOD Demetrio Vega MD         PRN Meds:  Current Outpatient Medications   Medication Sig Dispense Refill    albuterol (PROVENTIL/VENTOLIN HFA) 90 mcg/actuation inhaler 2 puffs every 4 (four) hours as needed.      allerg xt,WHITNEYfarinae-D.pteronys (ODACTRA) 12 SQ-HDM Subl Place 1 tablet under the tongue once daily. 30 tablet 11    ALPRAZolam (XANAX) 0.5 MG tablet Take one tablet by mouth 1 hour before procedure and bring other tablet with you to the procedure. 2 tablet 0    cetirizine (ZYRTEC) 10 MG tablet Take 10 mg by mouth once daily.      fluticasone propionate (FLONASE) 50 mcg/actuation nasal spray 1 spray (50 mcg total) by Each Nostril route 2 (two) times daily as needed for Rhinitis. 15 g 0    HYDROcodone-acetaminophen (NORCO) 5-325 mg per tablet Take 1 tablet by mouth every 6 (six) hours as needed for Pain. 6 tablet 0    ibuprofen (ADVIL,MOTRIN) 600 MG tablet Take 1 tablet (600 mg total) by mouth every 6 (six) hours as needed for Pain. 20 tablet 0    ketoconazole (NIZORAL) 2 % cream SMARTSI Application Topical 1 to 2 Times Daily      nystatin (MYCOSTATIN) powder SMARTSIG:Packet(s) Topical Twice Daily      triamcinolone acetonide 0.1% (KENALOG) 0.1 % cream SMARTSIG:sparingly Topical Twice Daily      EPINEPHrine (AUVI-Q) 0.3 mg/0.3 mL AtIn Inject 0.3 mLs (0.3 mg total) into the muscle once. 0.3 mL 3     Current Facility-Administered Medications   Medication Dose Route Frequency Provider Last Rate Last Admin    LIDOcaine HCL 10 mg/ml (1%) injection 5 mL  5 mL Other 1 time in Clinic/HOD Demetrio Vega MD         LIDOcaine-EPINEPHrine 1%-1:100,000 30 mL, LIDOcaine HCL 10 mg/ml (1%) 20 mL, sodium bicarbonate 10 mL in sodium chloride 0.9% 500 mL solution   MISCELLANEOUS 1 time in Clinic/HOD Demetrio Vega MD           Review of patient's allergies indicates:   Allergen Reactions    Fish containing products Anaphylaxis       Past Medical History:   Diagnosis Date    Allergy     Fibroids     Iron deficiency anemia, unspecified     Unspecified chronic bronchitis      Past Surgical History:   Procedure Laterality Date    BUNIONECTOMY Bilateral     COLONOSCOPY N/A 8/16/2023    Procedure: COLONOSCOPY;  Surgeon: Jacques Bautista MD;  Location: [facility] ENDO;  Service: Endoscopy;  Laterality: N/A;    COLONOSCOPY W/ POLYPECTOMY  08/16/2023    HYSTEROSCOPY WITH DILATION AND CURETTAGE OF UTERUS N/A 12/27/2023    Procedure: HYSTEROSCOPY, WITH DILATION AND CURETTAGE OF UTERUS;  Surgeon: Zaida Land MD;  Location: [facility] OR;  Service: OB/GYN;  Laterality: N/A;    TUBAL LIGATION       Family History   Problem Relation Name Age of Onset    Eczema Daughter Nicci     Allergic rhinitis Daughter Nicci     Breast cancer Maternal Aunt      Colon cancer Neg Hx      Ovarian cancer Neg Hx      Cancer Neg Hx      Uterine cancer Neg Hx      Cervical cancer Neg Hx       Social History     Tobacco Use    Smoking status: Never    Smokeless tobacco: Never   Substance Use Topics    Alcohol use: Yes     Comment: socially - monthly    Drug use: No        Review of Systems:  Patient denies constitutional symptoms, cardiac symptoms, respiratory symptoms, GI symptoms.  The remainder of the musculoskeletal ROS is included in the HPI.      OBJECTIVE:     Physical Exam:  Gen:  No acute distress  CV:  Peripherally well-perfused.  Pulses 2+ bilaterally.  Lungs:  Normal respiratory effort.  Abdomen:  Soft, non-tender, non-distended  Head/Neck:  Normocephalic.  Atraumatic. No TTP, AROM and PROM intact without pain  Neuro:  CN intact without deficit,  SILT throughout B/L Upper & Lower Extremities     right Shoulder Exam:    Observation:     Swelling  none  Deformity  none   Discoloration  none   Scapular winging none   Scars   none  Atrophy  none    Tenderness/Crepitus:          T/C      T/C   Clavicle   -/-  SUPRAspinatus    +/-   AC Jt.    -/-  INFRAspinatus  -/-   SC Jt.    -/-  Deltoid    -/-   G. Tuberosity  -/-  LH BICEP groove  +/-   Acromion:  -/-  Midline Neck   -/-   Scapular Spine -/-  Trapezium   -/-   SMA Scapula  -/-  GH jt. line - post  -/-   Scapulothoracic  -/-         ROM: (* = with pain)  Right shoulder   Left shoulder        AROM (PROM)   AROM (PROM)   FE    *150° (165°)     170° (175°)     ER at 0°    60°  (65°)    60°  (65°)   ER at 90° ABD  *60°  (90°)    90°  (90°)   IR at 90°  ABD   NA  (40°)     NA  (40°)      IR (spine level)   *T10     T10    Strength: (* = with pain) Right shoulder   Left shoulder   SCAPTION at 0   *5+/5    5/5    SCAPTION at 30   5+/5    5/5    IR    5/5    5/5   ER    5/5    5/5   BICEPS   5/5    5/5   Deltoid    5/5    5/5     Signs:  Painful side       NEER   Neg   OJORGES  +   THAPA   +   SPEEDS  +   DROP ARM   neg   BELLY PRESS Neg   Superior escape none    LIFT-OFF  Neg   X-Body ADD    neg    MOVING VALGUS Neg      Stability Testing      Right shoulder   Left shoulder      Translation    Anterior  up face     up face    Posterior  up face    up face    Sulcus   < 10mm    < 10 mm    Signs    Apprehension   neg      Neg    Relocation   no change     no change    Jerk test  neg     Neg      Extremity Neuro-Vascular Exam    Sensation grossly intact to light touch all dermatomal regions.    DTR 2+ Biceps, Triceps, BR and Negative Dons sign   Grossly intact motor function at Elbow, Wrist and Hand   Distal pulses radial and ulnar 2+, brisk cap refill, symmetric.      Neck:  Painless FROM and spinous processes non-tender. Negative Spurlings sign.      Other Findings:    Diagnostic Results:    Imaging -  I independently viewed the patient's imaging as well as the radiology report.  Xrays of the patient's Right shoulder demonstrate no evidence of any acute fractures or dislocations or significant degenerative changes.  Well-maintained subacromial space and reduced glenohumeral joint.  Type 2 acromion.    ASSESSMENT/PLAN:     A/P: Sara Alcaraz is a 49 y.o. who presents with right subacromial impingement and long head biceps tendinitis.      Plan:  - Conservative versus nonconservative options were discussed with the patient.  Conservative options include, but not limited to, activity modification, bracing, creams, NSAIDs, physical therapy/occupational therapy, home exercise program, CSI, and ultimately surgical intervention.  Patient was already failed a formal course of physical therapy, but has yet to attempt CSI which I have recommended today with another course of physical therapy.  - right subacromial CSI performed in clinic today  - PT referral provided  - NSAIDs/Tylenol PRN  - f/u 3 months and if no improvement consider MRI    Pablo Blake MD  Orthopaedic Surgery Resident

## 2024-04-16 NOTE — PROCEDURES
Large Joint Aspiration/Injection: R subacromial bursa    Date/Time: 4/16/2024 10:30 AM    Performed by: Lida Reyes MD  Authorized by: Lida Reyes MD    Consent Done?:  Yes (Verbal)  Indications:  Pain  Timeout: prior to procedure the correct patient, procedure, and site was verified      Local anesthesia used?: Yes    Anesthesia:  Local infiltration  Local anesthetic:  Lidocaine 1% without epinephrine    Details:  Needle Size:  22 G  Approach:  Posterior  Location:  Shoulder  Site:  R subacromial bursa  Medications:  80 mg triamcinolone acetonide 40 mg/mL

## 2024-04-16 NOTE — PROGRESS NOTES
RHD F  With Right shoulder pain for over a year, it was worse where she could not raise it all. It got better now worse again    Pt states in raising her arm to the side and putting it down it is very painful  + pain at night and ADLs    +TTP over AC  and biceps, NTTP over c- spine  + impingement  Pain with cross body

## 2024-04-19 RX ORDER — TRIAMCINOLONE ACETONIDE 40 MG/ML
80 INJECTION, SUSPENSION INTRA-ARTICULAR; INTRAMUSCULAR
Status: DISCONTINUED | OUTPATIENT
Start: 2024-04-16 | End: 2024-04-19 | Stop reason: HOSPADM

## 2024-05-06 ENCOUNTER — OFFICE VISIT (OUTPATIENT)
Dept: OBSTETRICS AND GYNECOLOGY | Facility: CLINIC | Age: 49
End: 2024-05-06
Attending: OBSTETRICS & GYNECOLOGY
Payer: COMMERCIAL

## 2024-05-06 VITALS
DIASTOLIC BLOOD PRESSURE: 86 MMHG | SYSTOLIC BLOOD PRESSURE: 135 MMHG | HEIGHT: 64 IN | HEART RATE: 61 BPM | WEIGHT: 178 LBS | BODY MASS INDEX: 30.39 KG/M2

## 2024-05-06 DIAGNOSIS — N95.0 PMB (POSTMENOPAUSAL BLEEDING): ICD-10-CM

## 2024-05-06 DIAGNOSIS — Z12.4 SCREENING FOR MALIGNANT NEOPLASM OF THE CERVIX: Primary | ICD-10-CM

## 2024-05-06 PROCEDURE — 88305 TISSUE EXAM BY PATHOLOGIST: CPT | Mod: 26,,, | Performed by: PATHOLOGY

## 2024-05-06 PROCEDURE — 87624 HPV HI-RISK TYP POOLED RSLT: CPT | Performed by: OBSTETRICS & GYNECOLOGY

## 2024-05-06 PROCEDURE — 88305 TISSUE EXAM BY PATHOLOGIST: CPT | Performed by: PATHOLOGY

## 2024-05-06 PROCEDURE — 88141 CYTOPATH C/V INTERPRET: CPT | Mod: ,,, | Performed by: PATHOLOGY

## 2024-05-06 PROCEDURE — 1159F MED LIST DOCD IN RCRD: CPT | Mod: CPTII,S$GLB,, | Performed by: OBSTETRICS & GYNECOLOGY

## 2024-05-06 PROCEDURE — 3075F SYST BP GE 130 - 139MM HG: CPT | Mod: CPTII,S$GLB,, | Performed by: OBSTETRICS & GYNECOLOGY

## 2024-05-06 PROCEDURE — 99999 PR PBB SHADOW E&M-EST. PATIENT-LVL IV: CPT | Mod: PBBFAC,,, | Performed by: OBSTETRICS & GYNECOLOGY

## 2024-05-06 PROCEDURE — 88175 CYTOPATH C/V AUTO FLUID REDO: CPT | Performed by: PATHOLOGY

## 2024-05-06 PROCEDURE — 99214 OFFICE O/P EST MOD 30 MIN: CPT | Mod: S$GLB,,, | Performed by: OBSTETRICS & GYNECOLOGY

## 2024-05-06 PROCEDURE — 3008F BODY MASS INDEX DOCD: CPT | Mod: CPTII,S$GLB,, | Performed by: OBSTETRICS & GYNECOLOGY

## 2024-05-06 PROCEDURE — 3079F DIAST BP 80-89 MM HG: CPT | Mod: CPTII,S$GLB,, | Performed by: OBSTETRICS & GYNECOLOGY

## 2024-05-06 NOTE — PROGRESS NOTES
SUBJECTIVE:   49 y.o. female [id number] today complaining of vaginal bleeding.  Patient's last menstrual period was 10/11/2021 (within days)..  She reports going through menopause nearly 3 years ago. She had D&C/hysteroscopy for  bleeding in December which showed a benign polyp.   .        Past Medical History:   Diagnosis Date    Allergy     Fibroids     Iron deficiency anemia, unspecified     Unspecified chronic bronchitis      Past Surgical History:   Procedure Laterality Date    BUNIONECTOMY Bilateral     COLONOSCOPY N/A 2023    Procedure: COLONOSCOPY;  Surgeon: Jacques Bautista MD;  Location: [facility] ENDO;  Service: Endoscopy;  Laterality: N/A;    COLONOSCOPY W/ POLYPECTOMY  2023    HYSTEROSCOPY WITH DILATION AND CURETTAGE OF UTERUS N/A 2023    Procedure: HYSTEROSCOPY, WITH DILATION AND CURETTAGE OF UTERUS;  Surgeon: Zaida Land MD;  Location: [facility] OR;  Service: OB/GYN;  Laterality: N/A;    TUBAL LIGATION       Social History     Socioeconomic History    Marital status:    Tobacco Use    Smoking status: Never    Smokeless tobacco: Never   Substance and Sexual Activity    Alcohol use: Yes     Comment: socially - monthly    Drug use: No    Sexual activity: Yes     Partners: Male     Birth control/protection: See Surgical Hx, Condom     Comment: occasionaly uses condoms     Family History   Problem Relation Name Age of Onset    Eczema Daughter Nicci     Allergic rhinitis Daughter Nicci     Breast cancer Maternal Aunt      Colon cancer Neg Hx      Ovarian cancer Neg Hx      Cancer Neg Hx      Uterine cancer Neg Hx      Cervical cancer Neg Hx       OB History    Para Term  AB Living   3 3 3         SAB IAB Ectopic Multiple Live Births                  # Outcome Date GA Lbr Rafa/2nd Weight Sex Type Anes PTL Lv   3 Term      Vag-Spont      2 Term      Vag-Spont      1 Term      Vag-Spont              Current Outpatient Medications   Medication Sig Dispense  Refill    albuterol (PROVENTIL/VENTOLIN HFA) 90 mcg/actuation inhaler 2 puffs every 4 (four) hours as needed.      allerg xt,D.farinae-D.pteronys (ODACTRA) 12 SQ-HDM Subl Place 1 tablet under the tongue once daily. 30 tablet 11    ALPRAZolam (XANAX) 0.5 MG tablet Take one tablet by mouth 1 hour before procedure and bring other tablet with you to the procedure. 2 tablet 0    cetirizine (ZYRTEC) 10 MG tablet Take 10 mg by mouth once daily.      EPINEPHrine (AUVI-Q) 0.3 mg/0.3 mL AtIn Inject 0.3 mLs (0.3 mg total) into the muscle once. 0.3 mL 3    fluticasone propionate (FLONASE) 50 mcg/actuation nasal spray 1 spray (50 mcg total) by Each Nostril route 2 (two) times daily as needed for Rhinitis. 15 g 0    HYDROcodone-acetaminophen (NORCO) 5-325 mg per tablet Take 1 tablet by mouth every 6 (six) hours as needed for Pain. 6 tablet 0    ibuprofen (ADVIL,MOTRIN) 600 MG tablet Take 1 tablet (600 mg total) by mouth every 6 (six) hours as needed for Pain. 20 tablet 0    ketoconazole (NIZORAL) 2 % cream SMARTSI Application Topical 1 to 2 Times Daily      nystatin (MYCOSTATIN) powder SMARTSIG:Packet(s) Topical Twice Daily      triamcinolone acetonide 0.1% (KENALOG) 0.1 % cream SMARTSIG:sparingly Topical Twice Daily       Current Facility-Administered Medications   Medication Dose Route Frequency Provider Last Rate Last Admin    LIDOcaine HCL 10 mg/ml (1%) injection 5 mL  5 mL Other 1 time in Clinic/HOD Demetrio Vega MD        LIDOcaine-EPINEPHrine 1%-1:100,000 30 mL, LIDOcaine HCL 10 mg/ml (1%) 20 mL, sodium bicarbonate 10 mL in sodium chloride 0.9% 500 mL solution   MISCELLANEOUS 1 time in Clinic/HOD Demetrio Vega MD         Allergies: Fish containing products     The ASCVD Risk score (Iftikhar DK, et al., 2019) failed to calculate for the following reasons:    Cannot find a previous HDL lab    Cannot find a previous total cholesterol lab      ROS:  Constitutional: no weight loss, weight gain, fever,  fatigue  Eyes:  No vision changes, glasses/contacts  ENT/Mouth: No ulcers, sinus problems, ears ringing, headache  Cardiovascular: No inability to lie flat, chest pain, exercise intolerance, swelling, heart palpitations  Respiratory: No wheezing, coughing blood, shortness of breath, or cough  Gastrointestinal: No diarrhea, bloody stool, nausea/vomiting, constipation, gas, hemorrhoids  Genitourinary: No blood in urine, painful urination, urgency of urination, frequency of urination, incomplete emptying, incontinence, abnormal bleeding, painful periods, heavy periods, vaginal discharge, vaginal odor, painful intercourse, sexual problems, bleeding after intercourse.  Musculoskeletal: No muscle weakness  Skin/Breast: No painful breasts, nipple discharge, masses, rash, ulcers  Neurological: No passing out, seizures, numbness, headache  Endocrine: No diabetes, hypothyroid, hyperthyroid, hot flashes, hair loss, abnormal hair growth, acne  Psychiatric: No depression, crying  Hematologic: No bruises, bleeding, swollen lymph nodes, anemia.      Physical Exam  Breasts: breasts appear normal, no suspicious masses, no skin or nipple changes or axillary nodes.  General- well developed, well nourished  Vulva- no masses, no lesions  Vagina-  no masses, no lesions, +blood  Cervix- no Cervical motion tenderness, no lesions  Uterus- normal size, nontender  Adnexa- nontender, no masses    The cervix was swabbed with Betadinex3 times 3. A uterine pipelle was inserted into the uterus to a level of 9.  The patient tolerated with above procedure well.    ASSESSMENT:   Postmenopausal bleeding    PLAN:   Follow up endometrial biopsy and pap smear/HPV  Follow up ultrasound

## 2024-05-08 ENCOUNTER — HOSPITAL ENCOUNTER (OUTPATIENT)
Dept: RADIOLOGY | Facility: OTHER | Age: 49
Discharge: HOME | End: 2024-05-08
Attending: OBSTETRICS & GYNECOLOGY
Payer: COMMERCIAL

## 2024-05-08 DIAGNOSIS — N95.0 PMB (POSTMENOPAUSAL BLEEDING): ICD-10-CM

## 2024-05-08 LAB
FINAL PATHOLOGIC DIAGNOSIS: NORMAL
GROSS: NORMAL
HPV HR 12 DNA SPEC QL NAA+PROBE: NEGATIVE
HPV16 AG SPEC QL: NEGATIVE
HPV18 DNA SPEC QL NAA+PROBE: NEGATIVE
Lab: NORMAL

## 2024-05-08 PROCEDURE — 76856 US EXAM PELVIC COMPLETE: CPT | Mod: 26,,, | Performed by: RADIOLOGY

## 2024-05-08 PROCEDURE — 76856 US EXAM PELVIC COMPLETE: CPT | Mod: TC

## 2024-05-08 PROCEDURE — 76830 TRANSVAGINAL US NON-OB: CPT | Mod: 26,,, | Performed by: RADIOLOGY

## 2024-05-08 PROCEDURE — 76830 TRANSVAGINAL US NON-OB: CPT | Mod: TC

## 2024-05-13 LAB
FINAL PATHOLOGIC DIAGNOSIS: NORMAL
Lab: NORMAL

## 2024-06-17 ENCOUNTER — OFFICE VISIT (OUTPATIENT)
Dept: VASCULAR SURGERY | Facility: CLINIC | Age: 49
End: 2024-06-17
Payer: COMMERCIAL

## 2024-06-17 VITALS
BODY MASS INDEX: 30.11 KG/M2 | HEART RATE: 60 BPM | DIASTOLIC BLOOD PRESSURE: 83 MMHG | OXYGEN SATURATION: 100 % | HEIGHT: 64 IN | SYSTOLIC BLOOD PRESSURE: 142 MMHG | WEIGHT: 176.38 LBS

## 2024-06-17 DIAGNOSIS — I87.2 VENOUS INSUFFICIENCY OF BOTH LOWER EXTREMITIES: Primary | ICD-10-CM

## 2024-06-17 PROCEDURE — 3008F BODY MASS INDEX DOCD: CPT | Mod: CPTII,S$GLB,, | Performed by: SURGERY

## 2024-06-17 PROCEDURE — 99213 OFFICE O/P EST LOW 20 MIN: CPT | Mod: S$GLB,,, | Performed by: SURGERY

## 2024-06-17 PROCEDURE — 3077F SYST BP >= 140 MM HG: CPT | Mod: CPTII,S$GLB,, | Performed by: SURGERY

## 2024-06-17 PROCEDURE — 3079F DIAST BP 80-89 MM HG: CPT | Mod: CPTII,S$GLB,, | Performed by: SURGERY

## 2024-06-17 PROCEDURE — 1159F MED LIST DOCD IN RCRD: CPT | Mod: CPTII,S$GLB,, | Performed by: SURGERY

## 2024-06-17 NOTE — PROGRESS NOTES
Demetrio Vega MD, RPVI                                 [facility]                           [facility]                             06/17/2024    HPI:  Sara Alcaraz is a 49 y.o. female with   Patient Active Problem List   Diagnosis    Pain of right thumb    Decreased range of motion of right thumb    Varicose veins of bilateral lower extremities with pain    Venous insufficiency    Left leg pain    Viral URI with cough    Pharyngitis    Status post hysteroscopy, dilation and curettage    being managed by PCP and specialists who is here today for evaluation of RLE edema.  Patient states location is RLE occurring for yrs.  Associated signs and symptoms include pain.  Quality is heavy and severity is 6/10.  Symptoms began yrs ago.  Alleviating factors include elevation.  Worsening factors include dependency.  Patient has been wearing compression stockings for greater than 3 months.  No FH of venous disease.  Symptoms do limit patient's functional status and daily activities.  no DVT history.  no venous interventions.  no low sodium diet.  no excessive water intake.    Migraine with aura: no  PFO/ASD/right to left shunt: no  Pregnant: no  Breastfeeding: no    no MI  no Stroke  Tobacco use: denies    6/2024:  s/p R GSV EVLT 3/2024.  Doing well.    Past Medical History:   Diagnosis Date    Allergy     Fibroids     Iron deficiency anemia, unspecified     Unspecified chronic bronchitis      Past Surgical History:   Procedure Laterality Date    BUNIONECTOMY Bilateral     COLONOSCOPY N/A 8/16/2023    Procedure: COLONOSCOPY;  Surgeon: Jacques Bautista MD;  Location: [facility];  Service: Endoscopy;  Laterality: N/A;    COLONOSCOPY W/ POLYPECTOMY  08/16/2023    HYSTEROSCOPY WITH DILATION AND CURETTAGE OF UTERUS N/A 12/27/2023    Procedure: HYSTEROSCOPY, WITH DILATION AND CURETTAGE OF UTERUS;  Surgeon: Zaida Land MD;  Location: [facility];  Service: OB/GYN;  Laterality: N/A;     TUBAL LIGATION       Family History   Problem Relation Name Age of Onset    Eczema Daughter Nicci     Allergic rhinitis Daughter Nicci     Breast cancer Maternal Aunt      Colon cancer Neg Hx      Ovarian cancer Neg Hx      Cancer Neg Hx      Uterine cancer Neg Hx      Cervical cancer Neg Hx       Social History     Socioeconomic History    Marital status:    Tobacco Use    Smoking status: Never    Smokeless tobacco: Never   Substance and Sexual Activity    Alcohol use: Yes     Comment: socially - monthly    Drug use: No    Sexual activity: Yes     Partners: Male     Birth control/protection: See Surgical Hx, Condom     Comment: occasionaly uses condoms       Current Outpatient Medications:     albuterol (PROVENTIL/VENTOLIN HFA) 90 mcg/actuation inhaler, 2 puffs every 4 (four) hours as needed., Disp: , Rfl:     allerg xt,D.farinae-D.pteronys (ODACTRA) 12 SQ-HDM Subl, Place 1 tablet under the tongue once daily., Disp: 30 tablet, Rfl: 11    cetirizine (ZYRTEC) 10 MG tablet, Take 10 mg by mouth once daily., Disp: , Rfl:     fluticasone propionate (FLONASE) 50 mcg/actuation nasal spray, 1 spray (50 mcg total) by Each Nostril route 2 (two) times daily as needed for Rhinitis., Disp: 15 g, Rfl: 0    ibuprofen (ADVIL,MOTRIN) 600 MG tablet, Take 1 tablet (600 mg total) by mouth every 6 (six) hours as needed for Pain., Disp: 20 tablet, Rfl: 0    ketoconazole (NIZORAL) 2 % cream, SMARTSI Application Topical 1 to 2 Times Daily, Disp: , Rfl:     nystatin (MYCOSTATIN) powder, SMARTSIG:Packet(s) Topical Twice Daily, Disp: , Rfl:     triamcinolone acetonide 0.1% (KENALOG) 0.1 % cream, SMARTSIG:sparingly Topical Twice Daily, Disp: , Rfl:     ALPRAZolam (XANAX) 0.5 MG tablet, Take one tablet by mouth 1 hour before procedure and bring other tablet with you to the procedure. (Patient not taking: Reported on 2024), Disp: 2 tablet, Rfl: 0    EPINEPHrine (AUVI-Q) 0.3 mg/0.3 mL AtIn, Inject 0.3 mLs (0.3 mg total)  into the muscle once., Disp: 0.3 mL, Rfl: 3    HYDROcodone-acetaminophen (NORCO) 5-325 mg per tablet, Take 1 tablet by mouth every 6 (six) hours as needed for Pain. (Patient not taking: Reported on 6/17/2024), Disp: 6 tablet, Rfl: 0    Current Facility-Administered Medications:     LIDOcaine HCL 10 mg/ml (1%) injection 5 mL, 5 mL, Other, 1 time in Clinic/HOD, Demetrio Vega MD    LIDOcaine-EPINEPHrine 1%-1:100,000 30 mL, LIDOcaine HCL 10 mg/ml (1%) 20 mL, sodium bicarbonate 10 mL in sodium chloride 0.9% 500 mL solution, , MISCELLANEOUS, 1 time in Clinic/HOD, Demetrio Vega MD    REVIEW OF SYSTEMS:  General: No fevers or chills; ENT: No sore throat; Allergy and Immunology: no persistent infections; Hematological and Lymphatic: No history of bleeding or easy bruising; Endocrine: negative; Respiratory: no cough, shortness of breath, or wheezing; Cardiovascular: no chest pain or dyspnea on exertion; Gastrointestinal: no abdominal pain/back, change in bowel habits, or bloody stools; Genito-Urinary: no dysuria, trouble voiding, or hematuria; Musculoskeletal: edema; Neurological: no TIA or stroke symptoms; Psychiatric: no nervousness, anxiety or depression.    PHYSICAL EXAM:      Pulse: 60         General appearance:  Alert, well-appearing, and in no distress.  Oriented to person, place, and time                    Neurological: Normal speech, no focal findings noted; CN II - XII grossly intact. RLE with sensation to light touch, LLE with sensation to light touch.            Musculoskeletal: Digits/nail without cyanosis/clubbing.  Strength 5/5 BLE.                    Neck: Supple, no significant adenopathy                  Chest:  No wheezes, symmetric air entry. No use of accessory muscles               Cardiac: Normal rate and regular rhythm            Abdomen: Soft, nontender, nondistended      Extremities:   2+ R DP pulse, 2+ L DP pulse      1+ RLE edema, 1+ LLE edema    Skin:  RLE no ulcer; LLE no  "ulcer      RLE no spider veins, LLE no spider veins      RLE no varicose veins, LLE no varicose veins    CEAP 3/3    VCSS 7    LAB RESULTS:  No results found for: "CBC"  No results found for: "LABPROT", "INR"  No results found for: "NA", "K", "CL", "CO2", "GLU", "BUN", "CREATININE", "CALCIUM", "ANIONGAP", "EGFRNONAA"  No results found for: "WBC", "RBC", "HGB", "HCT", "MCV", "MCH", "MCHC", "RDW", "PLT", "MPV", "GRAN", "LYMPH", "MONO", "EOS", "BASO", "EOSINOPHIL", "BASOPHIL", "DIFFMETHOD"  .No results found for: "HGBA1C"    IMAGING:  All pertinent imaging has been reviewed and interpreted independently.    Venous US   Lower Extremity Veins   =================     Rt CFV:    complete compression, complete color fill   Rt prox femoral V: complete compression, complete color fill   Rt mid femoral V:  complete compression, complete color fill   Rt distal femoral V:   complete compression, complete color fill   Rt mid popliteal V:    complete compression, complete color fill   Rt post tibial V:  complete compression, complete color fill   Rt peroneal V: complete compression, complete color fill   Rt GSV:    complete compression, complete color fill, significant reflux   Rt SSV:    complete compression, complete color fill   Add rt lower V details:    complete compression, complete color fill   Lt CFV:    complete compression, complete color fill   Lt prox femoral V: complete compression, complete color fill   Lt mid femoral V:  complete compression, complete color fill   Lt distal femoral V:   complete compression, complete color fill   Lt mid popliteal V:    complete compression, complete color fill   Lt post tibial V:  complete compression, complete color fill   Lt peroneal V: complete compression, complete color fill   Lt GSV:    complete compression, complete color fill   Lt SSV:    complete compression, complete color fill   Add lt lower V details:    complete compression, complete color fill   Additional lower vein: " Saphenofemoral Junction   Other: No reflux noted in the Accessory Veins     Lower Extremity Vein Mapping   =======================     Rt prox GSV thigh AP diam  3.7 mm   Rt mid GSV thigh AP diam   3.9 mm   Rt distal GSV thigh AP diam    3.2 mm   Rt GSV knee AP diam    2.7 mm   Rt prox GSV calf AP diam   2.3 mm   Rt mid GSV calf AP diam    2.5 mm     Comment   ========     -The diagnostic criteria used in our lab: Significant reflux is present when retrograde flow is noted both in the vein and at the   saphenofemoral junction (SFJ) or saphenopopliteal junction (SPJ)for 500 milliseconds (.5 second) following provocative maneuvers   when the patient is in the reverse trendelenburg position.     Impression   =========     Right Leg: Duplex imaging of the right lower extremity veins demonstrates patent and compressible veins.   There is no evidence of a venous thrombosis in the deep or superficial veins. Branching is noted throughout the course of the GSV.   Significant reflux noted in the right GSV including the Saphenofemoral Junction (SFJ). No significant reflux noted in the SSV or   accessory vein.     Left Leg: Duplex imaging of the left lower extremity veins demonstrates patent and compressible veins.   There is no evidence of a venous thrombosis in the deep or superficial veins.   No significant reflux noted in the left GSV including the Saphenofemoral Junction (SFJ) or the SSV including the Saphenopopliteal   Junction (SPJ). No reflux noted in the Accessory Vein.       DATE OF SERVICE: 07/03/2023     IMP/PLAN:  49 y.o. female with   Patient Active Problem List   Diagnosis    Pain of right thumb    Decreased range of motion of right thumb    Varicose veins of bilateral lower extremities with pain    Venous insufficiency    Left leg pain    Viral URI with cough    Pharyngitis    Status post hysteroscopy, dilation and curettage    being managed by PCP and specialists who is here today for evaluation of RLE edema  and pain.    -s/p R GSV EVLT 3/2024  -recommend compression with Rx stockings, elevation, dietary changes associated with water and sodium intake discussed at length with patient  -Exercise   -RTC prn    I spent 12 minutes evaluating this patient and greater than 50% of the time was spent counseling, coordinator care and discussing the plan of care.  All questions were answered and patient stated understanding with agreement with the above treatment plan.    Demetrio Vega MD [facility]  Vascular and Endovascular Surgery

## 2024-07-10 ENCOUNTER — TELEPHONE (OUTPATIENT)
Dept: ORTHOPEDICS | Facility: CLINIC | Age: 49
End: 2024-07-10
Payer: COMMERCIAL

## 2024-07-10 NOTE — TELEPHONE ENCOUNTER
Spoke c pt. Confirmed appt location & time c Dr. Grullon 7/16. Pt expressed understanding & was thankful.

## 2024-07-16 ENCOUNTER — OFFICE VISIT (OUTPATIENT)
Dept: ORTHOPEDICS | Facility: CLINIC | Age: 49
End: 2024-07-16
Payer: COMMERCIAL

## 2024-07-16 DIAGNOSIS — M75.41 SUBACROMIAL IMPINGEMENT OF RIGHT SHOULDER: ICD-10-CM

## 2024-07-16 DIAGNOSIS — G89.29 CHRONIC RIGHT SHOULDER PAIN: Primary | ICD-10-CM

## 2024-07-16 DIAGNOSIS — M25.511 CHRONIC RIGHT SHOULDER PAIN: Primary | ICD-10-CM

## 2024-07-16 DIAGNOSIS — M75.21 TENDINITIS OF LONG HEAD OF BICEPS BRACHII OF RIGHT SHOULDER: ICD-10-CM

## 2024-07-16 PROCEDURE — 99999 PR PBB SHADOW E&M-EST. PATIENT-LVL III: CPT | Mod: PBBFAC,,, | Performed by: ORTHOPAEDIC SURGERY

## 2024-07-16 PROCEDURE — 1160F RVW MEDS BY RX/DR IN RCRD: CPT | Mod: CPTII,S$GLB,, | Performed by: ORTHOPAEDIC SURGERY

## 2024-07-16 PROCEDURE — 1159F MED LIST DOCD IN RCRD: CPT | Mod: CPTII,S$GLB,, | Performed by: ORTHOPAEDIC SURGERY

## 2024-07-16 PROCEDURE — 99214 OFFICE O/P EST MOD 30 MIN: CPT | Mod: S$GLB,,, | Performed by: ORTHOPAEDIC SURGERY

## 2024-07-16 NOTE — PROGRESS NOTES
H&P  Orthopaedics    SUBJECTIVE:   Chief Complaint: R shoulder pain  HPI interval 07/16/2024  Patient is here for follow-up chronic right shoulder pain.  Her last visit with us she received a right subacromial CSI which he states took sometime to work but had some relief thereafter.  She reports she did not have the time to attend physical therapy due to a an illness at that time.  However she states she was doing home exercises with bands.  She reports the pain of the right shoulder is mostly an anterior aspect it is constant and painful when raising up her arm and internally rotating her right arm.  She denies any numbness or tingling.  She denies any trauma or falls.        History of Present Illness: 4/16/2024  Patient is a 49 y.o. female who presents with several year history of atraumatic right shoulder pain.  Describes it primarily over the anterolateral aspect of the shoulder that is sharp with intermittent burning pain that radiates group home down the upper arm.  Attempted a formal course of physical therapy without much improvement.    She reports that the pain is worse with overhead activity. It also bothers her at night. Denies numbness or neck pain.    No hx of HEP, bracing, CSI, or Sx to RUE. They are RHD. 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    Of note, she was last seen 1 year ago in our clinic for a right trigger thumb for which she endorses no further triggering with only mild pain.  Her primary complaint today is her right shoulder.    Scheduled Meds:   LIDOcaine HCL 10 mg/ml (1%)  5 mL Other 1 time in Clinic/HOD    LIDOcaine-EPINEPHrine 1%-1:100,000 30 mL, LIDOcaine HCL 10 mg/ml (1%) 20 mL, sodium bicarbonate 10 mL in sodium chloride 0.9% 500 mL solution   MISCELLANEOUS 1 time in Clinic/HOD     Continuous Infusions:      PRN Meds:        Review of patient's allergies indicates:   Allergen Reactions    Fish containing products Anaphylaxis       Past Medical History:   Diagnosis Date    Allergy      Fibroids     Iron deficiency anemia, unspecified     Unspecified chronic bronchitis      Past Surgical History:   Procedure Laterality Date    BUNIONECTOMY Bilateral     COLONOSCOPY N/A 8/16/2023    Procedure: COLONOSCOPY;  Surgeon: Jacques Bautista MD;  Location: [facility];  Service: Endoscopy;  Laterality: N/A;    COLONOSCOPY W/ POLYPECTOMY  08/16/2023    HYSTEROSCOPY WITH DILATION AND CURETTAGE OF UTERUS N/A 12/27/2023    Procedure: HYSTEROSCOPY, WITH DILATION AND CURETTAGE OF UTERUS;  Surgeon: Zaida Land MD;  Location: [facility] OR;  Service: OB/GYN;  Laterality: N/A;    TUBAL LIGATION       Family History   Problem Relation Name Age of Onset    Eczema Daughter Nicci     Allergic rhinitis Daughter Nicci     Breast cancer Maternal Aunt      Colon cancer Neg Hx      Ovarian cancer Neg Hx      Cancer Neg Hx      Uterine cancer Neg Hx      Cervical cancer Neg Hx       Social History     Tobacco Use    Smoking status: Never    Smokeless tobacco: Never   Substance Use Topics    Alcohol use: Yes     Comment: socially - monthly    Drug use: No        Review of Systems:  Patient denies constitutional symptoms, cardiac symptoms, respiratory symptoms, GI symptoms.  The remainder of the musculoskeletal ROS is included in the HPI.      OBJECTIVE:     Physical Exam:  Gen:  No acute distress  CV:  Peripherally well-perfused.  Pulses 2+ bilaterally.  Lungs:  Normal respiratory effort.  Abdomen:  Soft, non-tender, non-distended  Head/Neck:  Normocephalic.  Atraumatic. No TTP, AROM and PROM intact without pain  Neuro:  CN intact without deficit, SILT throughout B/L Upper & Lower Extremities     right Shoulder Exam:    Observation:     Swelling  none  Deformity  none   Discoloration  none   Scapular winging none   Scars   none  Atrophy  none    Tenderness/Crepitus:          T/C      T/C   Clavicle   -/-  SUPRAspinatus    +/-   AC Jt.    -/-  INFRAspinatus  -/-   SC Jt.    -/-  Deltoid    -/-   G.  Tuberosity  -/-  LH BICEP groove  +/-   Acromion:  -/-  Midline Neck   -/-   Scapular Spine -/-  Trapezium   -/-   SMA Scapula  -/-  GH jt. line - post  -/-   Scapulothoracic  -/-         ROM: (* = with pain)  Right shoulder   Left shoulder        AROM (PROM)   AROM (PROM)   FE    *150° (165°)     170° (175°)     ER at 0°    60°  (65°)    60°  (65°)   ER at 90° ABD  *60°  (90°)    90°  (90°)   IR at 90°  ABD   NA  (40°)     NA  (40°)      IR (spine level)   *T10     T10    Strength: (* = with pain) Right shoulder   Left shoulder   SCAPTION at 0   *5+/5    5/5    SCAPTION at 30   5+/5    5/5    IR    5/5    5/5   ER    5/5    5/5   BICEPS   5/5    5/5   Deltoid    5/5    5/5     Signs:  Painful side       NEER   Neg   OJORGES  +   THAPA   +   SPEEDS  +   DROP ARM   neg   BELLY PRESS Neg   Superior escape none    LIFT-OFF  Neg   X-Body ADD    neg    MOVING VALGUS Neg      Stability Testing      Right shoulder   Left shoulder      Translation    Anterior  up face     up face    Posterior  up face    up face    Sulcus   < 10mm    < 10 mm    Signs    Apprehension   neg      Neg    Relocation   no change     no change    Jerk test  neg     Neg      Extremity Neuro-Vascular Exam    Sensation grossly intact to light touch all dermatomal regions.    DTR 2+ Biceps, Triceps, BR and Negative Dons sign   Grossly intact motor function at Elbow, Wrist and Hand   Distal pulses radial and ulnar 2+, brisk cap refill, symmetric.      Neck:  Painless FROM and spinous processes non-tender. Negative Spurlings sign.      Other Findings:    Diagnostic Results:    Imaging - I independently viewed the patient's imaging as well as the radiology report.  Xrays of the patient's Right shoulder demonstrate no evidence of any acute fractures or dislocations or significant degenerative changes.  Well-maintained subacromial space and reduced glenohumeral joint.  Type 2 acromion.    ASSESSMENT/PLAN:     A/P: Sara Alcaraz is a 49  dale who presents with right subacromial impingement and long head biceps tendinitis.    Diagnoses and all orders for this visit:    Chronic right shoulder pain  -     MRI Shoulder Without Contrast Right; Future    Tendinitis of long head of biceps brachii of right shoulder    Subacromial impingement of right shoulder       Plan:  - Conservative versus nonconservative options were discussed with the patient.  Conservative options include, but not limited to, activity modification, bracing, creams, NSAIDs, physical therapy/occupational therapy, home exercise program, CSI, and ultimately surgical intervention.  Patient was already failed a formal course of physical therapy, she has also failed a CSI.  She was previously recommended  another course of physical therapy however could not make it due to an illness.  We will order a right shoulder MRI to evaluate her right shoulder pain.  She will follow-up after an MRI via telemedicine appointment.

## 2024-07-17 NOTE — PROGRESS NOTES
Plan:  - Conservative versus nonconservative options were discussed with the patient.  Conservative options include, but not limited to, activity modification, bracing, creams, NSAIDs, physical therapy/occupational therapy, home exercise program, CSI, and ultimately surgical intervention.  Patient was already failed a formal course of physical therapy, she has also failed a CSI.  She was previously recommended  another course of physical therapy however could not make it due to an illness.  We will order a right shoulder MRI to evaluate her right shoulder pain.  She will follow-up after an MRI via telemedicine appointment.   Patient has failed conservative treatment MRI for preoperative evaluation

## 2024-07-24 ENCOUNTER — HOSPITAL ENCOUNTER (OUTPATIENT)
Dept: RADIOLOGY | Facility: OTHER | Age: 49
Discharge: HOME | End: 2024-07-24
Payer: COMMERCIAL

## 2024-07-24 DIAGNOSIS — G89.29 CHRONIC RIGHT SHOULDER PAIN: ICD-10-CM

## 2024-07-24 DIAGNOSIS — M25.511 CHRONIC RIGHT SHOULDER PAIN: ICD-10-CM

## 2024-07-24 PROCEDURE — 73221 MRI JOINT UPR EXTREM W/O DYE: CPT | Mod: TC,RT

## 2024-07-24 PROCEDURE — 73221 MRI JOINT UPR EXTREM W/O DYE: CPT | Mod: 26,RT,, | Performed by: RADIOLOGY

## 2024-07-30 ENCOUNTER — OFFICE VISIT (OUTPATIENT)
Dept: ORTHOPEDICS | Facility: CLINIC | Age: 49
End: 2024-07-30
Payer: COMMERCIAL

## 2024-07-30 VITALS — BODY MASS INDEX: 30.11 KG/M2 | HEIGHT: 64 IN | WEIGHT: 176.38 LBS

## 2024-07-30 DIAGNOSIS — G89.29 CHRONIC RIGHT SHOULDER PAIN: Primary | ICD-10-CM

## 2024-07-30 DIAGNOSIS — M25.511 CHRONIC RIGHT SHOULDER PAIN: Primary | ICD-10-CM

## 2024-07-30 PROCEDURE — 3008F BODY MASS INDEX DOCD: CPT | Mod: CPTII,95,, | Performed by: ORTHOPAEDIC SURGERY

## 2024-07-30 PROCEDURE — 99214 OFFICE O/P EST MOD 30 MIN: CPT | Mod: 95,,, | Performed by: ORTHOPAEDIC SURGERY

## 2024-07-30 PROCEDURE — 1159F MED LIST DOCD IN RCRD: CPT | Mod: CPTII,95,, | Performed by: ORTHOPAEDIC SURGERY

## 2024-08-09 NOTE — PROGRESS NOTES
H&P  Orthopaedics    SUBJECTIVE:   Chief Complaint: R shoulder pain  HPI interval 07/16/2024  Patient is here for follow-up chronic right shoulder pain.  Her last visit with us she received a right subacromial CSI which he states took sometime to work but had some relief thereafter.  She reports she did not have the time to attend physical therapy due to a an illness at that time.  However she states she was doing home exercises with bands.  She reports the pain of the right shoulder is mostly an anterior aspect it is constant and painful when raising up her arm and internally rotating her right arm.  She denies any numbness or tingling.  She denies any trauma or falls.        History of Present Illness: 4/16/2024  Patient is a 49 y.o. female who presents with several year history of atraumatic right shoulder pain.  Describes it primarily over the anterolateral aspect of the shoulder that is sharp with intermittent burning pain that radiates care home down the upper arm.  Attempted a formal course of physical therapy without much improvement.    She reports that the pain is worse with overhead activity. It also bothers her at night. Denies numbness or neck pain.    No hx of HEP, bracing, CSI, or Sx to RUE. They are RHD. 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    Of note, she was last seen 1 year ago in our clinic for a right trigger thumb for which she endorses no further triggering with only mild pain.  Her primary complaint today is her right shoulder.    Scheduled Meds:   LIDOcaine HCL 10 mg/ml (1%)  5 mL Other 1 time in Clinic/HOD    LIDOcaine-EPINEPHrine 1%-1:100,000 30 mL, LIDOcaine HCL 10 mg/ml (1%) 20 mL, sodium bicarbonate 10 mL in sodium chloride 0.9% 500 mL solution   MISCELLANEOUS 1 time in Clinic/HOD     Continuous Infusions:      PRN Meds:        Review of patient's allergies indicates:   Allergen Reactions    Fish containing products Anaphylaxis       Past Medical History:   Diagnosis Date    Allergy      Fibroids     Iron deficiency anemia, unspecified     Unspecified chronic bronchitis      Past Surgical History:   Procedure Laterality Date    BUNIONECTOMY Bilateral     COLONOSCOPY N/A 8/16/2023    Procedure: COLONOSCOPY;  Surgeon: Jacques Bautista MD;  Location: [facility];  Service: Endoscopy;  Laterality: N/A;    COLONOSCOPY W/ POLYPECTOMY  08/16/2023    HYSTEROSCOPY WITH DILATION AND CURETTAGE OF UTERUS N/A 12/27/2023    Procedure: HYSTEROSCOPY, WITH DILATION AND CURETTAGE OF UTERUS;  Surgeon: Zaida Land MD;  Location: [facility] OR;  Service: OB/GYN;  Laterality: N/A;    TUBAL LIGATION       Family History   Problem Relation Name Age of Onset    Eczema Daughter Nicci     Allergic rhinitis Daughter Nicci     Breast cancer Maternal Aunt      Colon cancer Neg Hx      Ovarian cancer Neg Hx      Cancer Neg Hx      Uterine cancer Neg Hx      Cervical cancer Neg Hx       Social History     Tobacco Use    Smoking status: Never    Smokeless tobacco: Never   Substance Use Topics    Alcohol use: Yes     Comment: socially - monthly    Drug use: No        Review of Systems:  Patient denies constitutional symptoms, cardiac symptoms, respiratory symptoms, GI symptoms.  The remainder of the musculoskeletal ROS is included in the HPI.      OBJECTIVE:     Physical Exam:  Gen:  No acute distress  CV:  Peripherally well-perfused.  Pulses 2+ bilaterally.  Lungs:  Normal respiratory effort.  Abdomen:  Soft, non-tender, non-distended  Head/Neck:  Normocephalic.  Atraumatic. No TTP, AROM and PROM intact without pain  Neuro:  CN intact without deficit, SILT throughout B/L Upper & Lower Extremities     right Shoulder Exam:    Observation:     Swelling  none  Deformity  none   Discoloration  none   Scapular winging none   Scars   none  Atrophy  none    Tenderness/Crepitus:          T/C      T/C   Clavicle   -/-  SUPRAspinatus    +/-   AC Jt.    -/-  INFRAspinatus  -/-   SC Jt.    -/-  Deltoid    -/-   G.  Tuberosity  -/-  LH BICEP groove  +/-   Acromion:  -/-  Midline Neck   -/-   Scapular Spine -/-  Trapezium   -/-   SMA Scapula  -/-  GH jt. line - post  -/-   Scapulothoracic  -/-         ROM: (* = with pain)  Right shoulder   Left shoulder        AROM (PROM)   AROM (PROM)   FE    *150° (165°)     170° (175°)     ER at 0°    60°  (65°)    60°  (65°)   ER at 90° ABD  *60°  (90°)    90°  (90°)   IR at 90°  ABD   NA  (40°)     NA  (40°)      IR (spine level)   *T10     T10    Strength: (* = with pain) Right shoulder   Left shoulder   SCAPTION at 0   *5+/5    5/5    SCAPTION at 30   5+/5    5/5    IR    5/5    5/5   ER    5/5    5/5   BICEPS   5/5    5/5   Deltoid    5/5    5/5     Signs:  Painful side       NEER   Neg   OJORGES  +   THAPA   +   SPEEDS  +   DROP ARM   neg   BELLY PRESS Neg   Superior escape none    LIFT-OFF  Neg   X-Body ADD    neg    MOVING VALGUS Neg      Stability Testing      Right shoulder   Left shoulder      Translation    Anterior  up face     up face    Posterior  up face    up face    Sulcus   < 10mm    < 10 mm    Signs    Apprehension   neg      Neg    Relocation   no change     no change    Jerk test  neg     Neg      Extremity Neuro-Vascular Exam    Sensation grossly intact to light touch all dermatomal regions.    DTR 2+ Biceps, Triceps, BR and Negative Dons sign   Grossly intact motor function at Elbow, Wrist and Hand   Distal pulses radial and ulnar 2+, brisk cap refill, symmetric.      Neck:  Painless FROM and spinous processes non-tender. Negative Spurlings sign.      Other Findings:    Diagnostic Results:    Imaging - I independently viewed the patient's imaging as well as the radiology report.  Xrays of the patient's Right shoulder demonstrate no evidence of any acute fractures or dislocations or significant degenerative changes.  Well-maintained subacromial space and reduced glenohumeral joint.  Type 2 acromion.    ASSESSMENT/PLAN:     A/P: Sara Alcaraz is a 49  y.o. who presents with right subacromial impingement and long head biceps tendinitis.    There are no diagnoses linked to this encounter.     Plan:    We discussed at great length treatment options specifically steroid injections physical therapy we did encourage the patient that she needed to undergo physical therapy at least to a home exercise program patient understands

## 2024-11-25 DIAGNOSIS — M79.641 RIGHT HAND PAIN: Primary | ICD-10-CM

## 2024-12-03 ENCOUNTER — HOSPITAL ENCOUNTER (OUTPATIENT)
Dept: RADIOLOGY | Facility: OTHER | Age: 49
Discharge: HOME | End: 2024-12-03
Attending: ORTHOPAEDIC SURGERY
Payer: COMMERCIAL

## 2024-12-03 ENCOUNTER — OFFICE VISIT (OUTPATIENT)
Dept: ORTHOPEDICS | Facility: CLINIC | Age: 49
End: 2024-12-03
Payer: COMMERCIAL

## 2024-12-03 VITALS — HEIGHT: 64 IN | BODY MASS INDEX: 30.11 KG/M2 | WEIGHT: 176.38 LBS

## 2024-12-03 DIAGNOSIS — M65.30 TRIGGER FINGER OF RIGHT HAND, UNSPECIFIED FINGER: ICD-10-CM

## 2024-12-03 DIAGNOSIS — M75.41 SUBACROMIAL IMPINGEMENT OF RIGHT SHOULDER: Primary | ICD-10-CM

## 2024-12-03 DIAGNOSIS — M25.511 RIGHT SHOULDER PAIN, UNSPECIFIED CHRONICITY: ICD-10-CM

## 2024-12-03 DIAGNOSIS — M67.921 BICEPS TENDINOPATHY, RIGHT: ICD-10-CM

## 2024-12-03 DIAGNOSIS — M79.641 RIGHT HAND PAIN: ICD-10-CM

## 2024-12-03 PROCEDURE — 3008F BODY MASS INDEX DOCD: CPT | Mod: CPTII,S$GLB,, | Performed by: ORTHOPAEDIC SURGERY

## 2024-12-03 PROCEDURE — 99999 PR PBB SHADOW E&M-EST. PATIENT-LVL III: CPT | Mod: PBBFAC,,, | Performed by: ORTHOPAEDIC SURGERY

## 2024-12-03 PROCEDURE — 73130 X-RAY EXAM OF HAND: CPT | Mod: 26,RT,, | Performed by: RADIOLOGY

## 2024-12-03 PROCEDURE — 73130 X-RAY EXAM OF HAND: CPT | Mod: TC,FY,RT

## 2024-12-03 PROCEDURE — 1159F MED LIST DOCD IN RCRD: CPT | Mod: CPTII,S$GLB,, | Performed by: ORTHOPAEDIC SURGERY

## 2024-12-03 PROCEDURE — 20550 NJX 1 TENDON SHEATH/LIGAMENT: CPT | Mod: 51,XS,RT,S$GLB | Performed by: ORTHOPAEDIC SURGERY

## 2024-12-03 PROCEDURE — 99215 OFFICE O/P EST HI 40 MIN: CPT | Mod: 25,S$GLB,, | Performed by: ORTHOPAEDIC SURGERY

## 2024-12-03 PROCEDURE — 20610 DRAIN/INJ JOINT/BURSA W/O US: CPT | Mod: RT,S$GLB,, | Performed by: ORTHOPAEDIC SURGERY

## 2024-12-03 RX ORDER — TRIAMCINOLONE ACETONIDE 40 MG/ML
80 INJECTION, SUSPENSION INTRA-ARTICULAR; INTRAMUSCULAR
Status: DISCONTINUED | OUTPATIENT
Start: 2024-12-03 | End: 2024-12-03 | Stop reason: HOSPADM

## 2024-12-03 RX ORDER — DEXAMETHASONE SODIUM PHOSPHATE 4 MG/ML
4 INJECTION, SOLUTION INTRA-ARTICULAR; INTRALESIONAL; INTRAMUSCULAR; INTRAVENOUS; SOFT TISSUE
Status: DISCONTINUED | OUTPATIENT
Start: 2024-12-03 | End: 2024-12-03 | Stop reason: HOSPADM

## 2024-12-03 RX ADMIN — TRIAMCINOLONE ACETONIDE 80 MG: 40 INJECTION, SUSPENSION INTRA-ARTICULAR; INTRAMUSCULAR at 09:12

## 2024-12-03 RX ADMIN — DEXAMETHASONE SODIUM PHOSPHATE 4 MG: 4 INJECTION, SOLUTION INTRA-ARTICULAR; INTRALESIONAL; INTRAMUSCULAR; INTRAVENOUS; SOFT TISSUE at 09:12

## 2024-12-03 NOTE — PROGRESS NOTES
Patient ID: Sara Alcaraz is a 49 y.o. female.    Chief Complaint: Follow-up of the Right Hand and Follow-up and Pain of the Right Shoulder    History of Present Illness    CHIEF COMPLAINT:  Ms. Alcaraz presents today for follow-up of shoulder pain and evaluation of hand pain.    HPI:  Ms. Alcaraz presents with ongoing shoulder pain, which has improved somewhat. She reports having pain in her shoulder and neck after returning from vacation. A previous doctor diagnosed subacromial impingement as the source of her pain, which was localized in her shoulder and radiating upwards. She had difficulty turning her head and neck.    She also reports issues with her hand, describing pain when using it excessively. The practitioner identifies this as a trigger finger with a cyst. She states that the cyst has decreased in size but is still present.    She reports current sinus congestion. She mentions taking the day off work due to this illness.    She had an MRI in July and a virtual visit to review the results. She also had a previous steroid injection in her hand but states that no one called her about a shoulder injection that was previously discussed.    She denies having received a steroid injection in her shoulder. She denies having BP (likely referring to bursitis or a similar condition, based on context).    WORK STATUS:  Ms. Alcaraz is currently employed. Her job involves frequent hand use, suggesting manual activity. She has taken time off work due to her current illness.    IMAGING:  Ms. Alcaraz underwent an MRI of the shoulder in July. The results suggested subacromial impingement-type symptoms.      ROS:  General: denies fever, denies chills, denies fatigue, denies weight gain, denies weight loss  Eyes: denies vision changes, denies redness, denies discharge  ENT: denies ear pain, denies nasal congestion, denies sore throat  Cardiovascular: denies chest pain, denies palpitations, denies lower extremity  edema  Respiratory: denies cough, denies shortness of breath  Gastrointestinal: denies abdominal pain, denies nausea, denies vomiting, denies diarrhea, denies constipation, denies blood in stool  Genitourinary: denies dysuria, denies hematuria, denies frequency  Musculoskeletal: reports joint pain, denies muscle pain, reports neck pain  Skin: denies rash, denies lesion  Neurological: denies headache, denies dizziness, denies numbness, denies tingling  Psychiatric: denies anxiety, denies depression, denies sleep difficulty         Physical Exam    MSK: Shoulder - Right: Positive impingement of the shoulder.         Assessment & Plan     Prescribed steroid injection for trigger finger and shoulder.   Recommend steroid injection and therapy to treat shoulder impingement.   Offered two options for trigger finger in the hand: another steroid injection or surgery.   Ms. Alcaraz agreed to proceed with the steroid injection, understanding it would be the last injection before potentially needing surgery.   Explained the surgical procedure for trigger finger, which involves local anesthesia, sedation, and a small incision to release the ligament trapping the tendon.              No follow-ups on file.    This note was generated with the assistance of ambient listening technology. Verbal consent was obtained by the patient and accompanying visitor(s) for the recording of patient appointment to facilitate this note. I attest to having reviewed and edited the generated note for accuracy, though some syntax or spelling errors may persist. Please contact the author of this note for any clarification.

## 2024-12-03 NOTE — PROCEDURES
Tendon Sheath    Date/Time: 12/3/2024 9:15 AM    Performed by: Lida Reyes MD  Authorized by: Lida Reyes MD    Consent Done?:  Yes (Verbal)  Indications:  Pain  Timeout: prior to procedure the correct patient, procedure, and site was verified    Prep: patient was prepped and draped in usual sterile fashion      Local anesthesia used?: Yes    Anesthesia:  Local infiltration  Local anesthetic:  Lidocaine 1% without epinephrine  Location:  Thumb  Site:  R thumb MCP  Ultrasonic guidance for needle placement?: Yes    Needle size:  25 G  Medications:  4 mg dexAMETHasone 4 mg/mL

## 2024-12-03 NOTE — PROCEDURES
Large Joint Aspiration/Injection: R subacromial bursa    Date/Time: 12/3/2024 9:15 AM    Performed by: Lida Reyes MD  Authorized by: Lida Reyes MD    Consent Done?:  Yes (Verbal)  Timeout: prior to procedure the correct patient, procedure, and site was verified      Local anesthesia used?: Yes    Anesthesia:  Local infiltration  Local anesthetic:  Lidocaine 1% without epinephrine    Details:  Needle Size:  22 G  Approach:  Posterior  Location:  Shoulder  Site:  R subacromial bursa  Medications:  80 mg triamcinolone acetonide 40 mg/mL

## 2024-12-03 NOTE — PROGRESS NOTES
H&P  Orthopaedics    SUBJECTIVE:   Chief Complaint: R shoulder pain    Interval history 12/03/2024  Patient returns to clinic for follow up of right shoulder pain as well as right trigger thumb.  With regards to right shoulder pain, at the last clinic visit we ordered an MRI of the right shoulder.  She also had a previous corticosteroid injection right subacromial space April 2024 however she does not recall this.  She reports lateral shoulder pain when she reaches overhead.  Since establishing care with us has not done physical therapy. With regards to right trigger thumb she has had 1 corticosteroid injection April 2024 which she states provided partial temporary relief but has experienced recurrence of symptoms was to try another corticosteroid injection today.      HPI interval 07/16/2024  Patient is here for follow-up chronic right shoulder pain.  Her last visit with us she received a right subacromial CSI which he states took sometime to work but had some relief thereafter.  She reports she did not have the time to attend physical therapy due to a an illness at that time.  However she states she was doing home exercises with bands.  She reports the pain of the right shoulder is mostly an anterior aspect it is constant and painful when raising up her arm and internally rotating her right arm.  She denies any numbness or tingling.  She denies any trauma or falls.      History of Present Illness: 4/16/2024  Patient is a 49 y.o. female who presents with several year history of atraumatic right shoulder pain.  Describes it primarily over the anterolateral aspect of the shoulder that is sharp with intermittent burning pain that radiates FPC down the upper arm.  Attempted a formal course of physical therapy without much improvement. She reports that the pain is worse with overhead activity. It also bothers her at night. Denies numbness or neck pain.  No hx of HEP, bracing, CSI, or Sx to RUE. They are RHD. 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  Of note, she was last seen 1 year ago in our clinic for a right trigger thumb for which she endorses no further triggering with only mild pain.  Her primary complaint today is her right shoulder.    Scheduled Meds:   LIDOcaine HCL 10 mg/ml (1%)  5 mL Other 1 time in Clinic/HOD    LIDOcaine-EPINEPHrine 1%-1:100,000 30 mL, LIDOcaine HCL 10 mg/ml (1%) 20 mL, sodium bicarbonate 10 mL in sodium chloride 0.9% 500 mL solution   MISCELLANEOUS 1 time in Clinic/HOD     Continuous Infusions:      PRN Meds:        Review of patient's allergies indicates:   Allergen Reactions    Fish containing products Anaphylaxis       Past Medical History:   Diagnosis Date    Allergy     Fibroids     Iron deficiency anemia, unspecified     Unspecified chronic bronchitis      Past Surgical History:   Procedure Laterality Date    BUNIONECTOMY Bilateral     COLONOSCOPY N/A 8/16/2023    Procedure: COLONOSCOPY;  Surgeon: Jacques Bautista MD;  Location: [facility];  Service: Endoscopy;  Laterality: N/A;    COLONOSCOPY W/ POLYPECTOMY  08/16/2023    HYSTEROSCOPY WITH DILATION AND CURETTAGE OF UTERUS N/A 12/27/2023    Procedure: HYSTEROSCOPY, WITH DILATION AND CURETTAGE OF UTERUS;  Surgeon: Zaida Land MD;  Location: [facility] OR;  Service: OB/GYN;  Laterality: N/A;    TUBAL LIGATION       Family History   Problem Relation Name Age of Onset    Eczema Daughter Nicci     Allergic rhinitis Daughter Nicci     Breast cancer Maternal Aunt      Colon cancer Neg Hx      Ovarian cancer Neg Hx      Cancer Neg Hx      Uterine cancer Neg Hx      Cervical cancer Neg Hx       Social History     Tobacco Use    Smoking status: Never    Smokeless tobacco: Never   Substance Use Topics    Alcohol use: Yes     Comment: socially - monthly    Drug use: No        Review of Systems:  Patient denies constitutional symptoms, cardiac symptoms, respiratory symptoms, GI symptoms.  The remainder of the musculoskeletal ROS is included in the  HPI.      OBJECTIVE:     Physical Exam:  Gen:  No acute distress  CV:  Peripherally well-perfused.  Pulses 2+ bilaterally.  Lungs:  Normal respiratory effort.  Abdomen:  Soft, non-tender, non-distended  Head/Neck:  Normocephalic.  Atraumatic. No TTP, AROM and PROM intact without pain  Neuro:  CN intact without deficit, SILT throughout B/L Upper & Lower Extremities    Right shoulder examination  No abnormality on inspection.  No scars, swelling, erythema.  No appreciable atrophy.  Tender to palpation over the long head of the biceps tendon in its groove.  Tender over the greater tuberosity.  Nontender over the glenohumeral joint line or the AC joint.  Active range of motion forward flexion 170, external rotation 60, internal rotation to L2.  Forward flexion past 90 and internal rotation are painful for her.  Passive range of motion forward flexion 180, external rotation 65.  5/5 strength supraspinatus, infraspinatus, subscapularis.  Motor and sensation intact all distributions axillary, radial, ain, median, ain, ulnar.  Positive Neer.  Positive Montes.  Positive Speed's.  Positive O'Briens.  Negative Bere.  Negative belly press.  Radial pulse 2 +brisk capillary refill all digits    Right hand examination  Tender to palpation over the thumb A1 pulley with a palpable nodule.  Able to make a full composite fist.  Motor and sensation intact.    Diagnostic Results:    Imaging - I independently viewed the patient's imaging as well as the radiology report.      Previous Xrays of the patient's right shoulder demonstrate no evidence of any acute fractures or dislocations or significant degenerative changes.  Well-maintained subacromial space and reduced glenohumeral joint.  Type 2 acromion.    X-rays of the right hand obtained in clinic today show no significant degenerative changes, no fracture no dislocation  ASSESSMENT/PLAN:     A/P: Sara Alcaraz is a 49 y.o. with chronic right shoulder pain due to subacromial  impingement and biceps tendinopathy, as well as right trigger thumb with a nodule    The patient and I had a thorough discussion today.  We discussed the working diagnosis as well as several other potential alternative diagnoses.  Treatment options were discussed, both conservative and surgical.  Conservative treatment options would include things such as activity modifications, workplace modifications, a period of rest, oral vs topical OTC and prescription anti-inflammatory medications, occupational therapy, splinting/bracing, immobilization, corticosteroid injections, and others.  Surgical options were discussed as well.     At this time, the patient would like to proceed with continue conservative management.  She prefers to try another corticosteroid injection right subacromial space today and right thumb flexor tendon sheath.  These were done in clinic today and she tolerated well.  I have placed a physical therapy referral for her right shoulder.    She will follow up in 6 weeks for repeat evaluation..    Should the patient's symptoms worsen, persist, or fail to improve they should return for reevaluation and I would be happy to see them back anytime.

## 2025-01-08 ENCOUNTER — TELEPHONE (OUTPATIENT)
Dept: ORTHOPEDICS | Facility: CLINIC | Age: 50
End: 2025-01-08
Payer: COMMERCIAL

## 2025-01-14 ENCOUNTER — OFFICE VISIT (OUTPATIENT)
Dept: ORTHOPEDICS | Facility: CLINIC | Age: 50
End: 2025-01-14
Payer: COMMERCIAL

## 2025-01-14 VITALS — BODY MASS INDEX: 30.11 KG/M2 | WEIGHT: 176.38 LBS | HEIGHT: 64 IN

## 2025-01-14 DIAGNOSIS — M65.30 TRIGGER FINGER OF RIGHT HAND, UNSPECIFIED FINGER: Primary | ICD-10-CM

## 2025-01-14 PROCEDURE — 99999 PR PBB SHADOW E&M-EST. PATIENT-LVL III: CPT | Mod: PBBFAC,,, | Performed by: ORTHOPAEDIC SURGERY

## 2025-01-14 PROCEDURE — 20550 NJX 1 TENDON SHEATH/LIGAMENT: CPT | Mod: RT,S$GLB,, | Performed by: ORTHOPAEDIC SURGERY

## 2025-01-14 PROCEDURE — 1159F MED LIST DOCD IN RCRD: CPT | Mod: CPTII,S$GLB,, | Performed by: ORTHOPAEDIC SURGERY

## 2025-01-14 PROCEDURE — 3008F BODY MASS INDEX DOCD: CPT | Mod: CPTII,S$GLB,, | Performed by: ORTHOPAEDIC SURGERY

## 2025-01-14 PROCEDURE — 99214 OFFICE O/P EST MOD 30 MIN: CPT | Mod: 25,S$GLB,, | Performed by: ORTHOPAEDIC SURGERY

## 2025-01-14 RX ADMIN — DEXAMETHASONE SODIUM PHOSPHATE 4 MG: 4 INJECTION, SOLUTION INTRA-ARTICULAR; INTRALESIONAL; INTRAMUSCULAR; INTRAVENOUS; SOFT TISSUE at 10:01

## 2025-01-14 NOTE — PROGRESS NOTES
H&P  Orthopaedics    SUBJECTIVE:   Chief Complaint: R shoulder pain  Interval history 1/14/25 Patient report today to the clinic today to f/u on her R shoulder injection and right thumb trigger finger and cyst. She received a shoulder injection and a thumb injection last visit 12/3/24, she states the shoulder injection helped a little but the thumb continues to hurt. She continues to attend PT with success.   Interval history 12/03/2024  Patient returns to clinic for follow up of right shoulder pain as well as right trigger thumb.  With regards to right shoulder pain, at the last clinic visit we ordered an MRI of the right shoulder.  She also had a previous corticosteroid injection right subacromial space April 2024 however she does not recall this.  She reports lateral shoulder pain when she reaches overhead.  Since establishing care with us has not done physical therapy. With regards to right trigger thumb she has had 1 corticosteroid injection April 2024 which she states provided partial temporary relief but has experienced recurrence of symptoms was to try another corticosteroid injection today.      HPI interval 07/16/2024  Patient is here for follow-up chronic right shoulder pain.  Her last visit with us she received a right subacromial CSI which he states took sometime to work but had some relief thereafter.  She reports she did not have the time to attend physical therapy due to a an illness at that time.  However she states she was doing home exercises with bands.  She reports the pain of the right shoulder is mostly an anterior aspect it is constant and painful when raising up her arm and internally rotating her right arm.  She denies any numbness or tingling.  She denies any trauma or falls.      History of Present Illness: 4/16/2024  Patient is a 49 y.o. female who presents with several year history of atraumatic right shoulder pain.  Describes it primarily over the anterolateral aspect of the shoulder  that is sharp with intermittent burning pain that radiates assisted down the upper arm.  Attempted a formal course of physical therapy without much improvement. She reports that the pain is worse with overhead activity. It also bothers her at night. Denies numbness or neck pain.  No hx of HEP, bracing, CSI, or Sx to RUE. They are RHD. 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  Of note, she was last seen 1 year ago in our clinic for a right trigger thumb for which she endorses no further triggering with only mild pain.  Her primary complaint today is her right shoulder.    Scheduled Meds:   LIDOcaine HCL 10 mg/ml (1%)  5 mL Other 1 time in Clinic/HOD    LIDOcaine-EPINEPHrine 1%-1:100,000 30 mL, LIDOcaine HCL 10 mg/ml (1%) 20 mL, sodium bicarbonate 10 mL in sodium chloride 0.9% 500 mL solution   MISCELLANEOUS 1 time in Clinic/HOD     Continuous Infusions:      PRN Meds:        Review of patient's allergies indicates:   Allergen Reactions    Fish containing products Anaphylaxis       Past Medical History:   Diagnosis Date    Allergy     Fibroids     Iron deficiency anemia, unspecified     Unspecified chronic bronchitis      Past Surgical History:   Procedure Laterality Date    BUNIONECTOMY Bilateral     COLONOSCOPY N/A 8/16/2023    Procedure: COLONOSCOPY;  Surgeon: Jacques Bautista MD;  Location: [facility];  Service: Endoscopy;  Laterality: N/A;    COLONOSCOPY W/ POLYPECTOMY  08/16/2023    HYSTEROSCOPY WITH DILATION AND CURETTAGE OF UTERUS N/A 12/27/2023    Procedure: HYSTEROSCOPY, WITH DILATION AND CURETTAGE OF UTERUS;  Surgeon: Zaida Land MD;  [facility]: [facility] OR;  Service: OB/GYN;  Laterality: N/A;    TUBAL LIGATION       Family History   Problem Relation Name Age of Onset    Eczema Daughter Nicci     Allergic rhinitis Daughter Davidawnnhaseeb     Breast cancer Maternal Aunt      Colon cancer Neg Hx      Ovarian cancer Neg Hx      Cancer Neg Hx      Uterine cancer Neg Hx      Cervical cancer Neg Hx       Social  History     Tobacco Use    Smoking status: Never    Smokeless tobacco: Never   Substance Use Topics    Alcohol use: Yes     Comment: socially - monthly    Drug use: No        Review of Systems:  Patient denies constitutional symptoms, cardiac symptoms, respiratory symptoms, GI symptoms.  The remainder of the musculoskeletal ROS is included in the HPI.      OBJECTIVE:     Physical Exam:  Gen:  No acute distress  CV:  Peripherally well-perfused.  Pulses 2+ bilaterally.  Lungs:  Normal respiratory effort.  Abdomen:  Soft, non-tender, non-distended  Head/Neck:  Normocephalic.  Atraumatic. No TTP, AROM and PROM intact without pain  Neuro:  CN intact without deficit, SILT throughout B/L Upper & Lower Extremities    Right shoulder examination  No abnormality on inspection.  No scars, swelling, erythema.  No appreciable atrophy.  Tender to palpation over the long head of the biceps tendon in its groove.  Tender over the greater tuberosity.  Nontender over the glenohumeral joint line or the AC joint.  Active range of motion forward flexion 170, external rotation 60, internal rotation to L2.  Forward flexion past 90 and internal rotation are painful for her.  Passive range of motion forward flexion 180, external rotation 65.  5/5 strength supraspinatus, infraspinatus, subscapularis.  Motor and sensation intact all distributions axillary, radial, ain, median, ain, ulnar.  Positive Neer.  Positive Montes.  Positive Speed's.  Positive O'Briens.  Negative Bere.  Negative belly press.  Radial pulse 2 +brisk capillary refill all digits    Right hand examination  Tender to palpation over the thumb A1 pulley with a palpable nodule.  Able to make a full composite fist.  Motor and sensation intact.    Diagnostic Results:    Imaging - I independently viewed the patient's imaging as well as the radiology report.      Previous Xrays of the patient's right shoulder demonstrate no evidence of any acute fractures or dislocations or  significant degenerative changes.  Well-maintained subacromial space and reduced glenohumeral joint.  Type 2 acromion.    X-rays of the right hand obtained in clinic today show no significant degenerative changes, no fracture no dislocation  ASSESSMENT/PLAN:     A/P: Sara Alcaraz is a 49 y.o. with chronic right shoulder pain due to subacromial impingement and biceps tendinopathy, as well as right trigger thumb with a nodule

## 2025-01-14 NOTE — PROCEDURES
Tendon Sheath    Date/Time: 1/14/2025 10:30 AM    Performed by: Lida Reyes MD  Authorized by: Lida Reyes MD    Consent Done?:  Yes (Verbal)  Indications:  Pain  Timeout: prior to procedure the correct patient, procedure, and site was verified    Prep: patient was prepped and draped in usual sterile fashion      Local anesthesia used?: Yes    Anesthesia:  Local infiltration  Local anesthetic:  Lidocaine 1% without epinephrine  Location:  Thumb  Site:  R thumb MCP  Ultrasonic guidance for needle placement?: Yes    Needle size:  25 G  Approach:  Volar  Medications:  4 mg dexAMETHasone 4 mg/mL

## 2025-01-26 RX ORDER — DEXAMETHASONE SODIUM PHOSPHATE 4 MG/ML
4 INJECTION, SOLUTION INTRA-ARTICULAR; INTRALESIONAL; INTRAMUSCULAR; INTRAVENOUS; SOFT TISSUE
Status: DISCONTINUED | OUTPATIENT
Start: 2025-01-14 | End: 2025-01-26 | Stop reason: HOSPADM

## 2025-01-26 NOTE — PROGRESS NOTES
Patient ID: Sara Alcaraz is a 49 y.o. female.    Chief Complaint: Pain of the Right Shoulder and Pain and Numbness of the Right Hand    History of Present Illness    CHIEF COMPLAINT:  Right shoulder AC impingement, right thumb cyst, and trigger finger.    HPI:  Ms. Alcaraz presents for follow-up of right shoulder and right thumb issues. She received injections for right shoulder AC impingement and right trigger finger, as well as treatment for a right thumb cyst during her last visit. Her shoulder is improving with physical therapy. However, her trigger finger and cyst are not getting better. She reports continued popping sensation in her right thumb. She also describes recent pain and tingling in her hand and arm, stating that her hand was painful in the last week, with pain and tingling sensation in the arm. She wonders if it's related to cold weather. She characterizes herself as achy.    PREVIOUS TREATMENTS:  Ms. Alcaraz received a right shoulder injection for AC impingement during her last visit, which provided some improvement. She has been undergoing physical therapy for her shoulder, which is helping. During her last visit, she also received an injection for trigger finger, but it has not provided significant benefit.      ROS:  General: denies fever, denies chills, denies fatigue, denies weight gain, denies weight loss  Eyes: denies vision changes, denies redness, denies discharge  ENT: denies ear pain, denies nasal congestion, denies sore throat  Cardiovascular: denies chest pain, denies palpitations, denies lower extremity edema  Respiratory: denies cough, denies shortness of breath  Gastrointestinal: denies abdominal pain, denies nausea, denies vomiting, denies diarrhea, denies constipation, denies blood in stool  Genitourinary: denies dysuria, denies hematuria, denies frequency  Musculoskeletal: denies joint pain, denies muscle pain  Skin: denies rash, denies lesion  Neurological: denies headache,  denies dizziness, denies numbness, reports tingling  Psychiatric: denies anxiety, denies depression, denies sleep difficulty         Physical Exam    MSK: Hand/Wrist - Right: Little pop in right thumb.         Assessment & Plan     Discussed treatment options for right thumb cyst and trigger finger.   Recommend surgery as an option.   Ms. Alcaraz would be able to move their finger immediately after surgery.   Discussed alternative option of another injection.   Right thumb injection administered today.   Follow up in 6 weeks if injection does not improve the issue.              No follow-ups on file.    This note was generated with the assistance of ambient listening technology. Verbal consent was obtained by the patient and accompanying visitor(s) for the recording of patient appointment to facilitate this note. I attest to having reviewed and edited the generated note for accuracy, though some syntax or spelling errors may persist. Please contact the author of this note for any clarification.

## 2025-02-25 ENCOUNTER — OFFICE VISIT (OUTPATIENT)
Dept: ORTHOPEDICS | Facility: CLINIC | Age: 50
End: 2025-02-25
Payer: COMMERCIAL

## 2025-02-25 VITALS — WEIGHT: 176.38 LBS | HEIGHT: 64 IN | BODY MASS INDEX: 30.11 KG/M2

## 2025-02-25 DIAGNOSIS — M25.511 RIGHT SHOULDER PAIN, UNSPECIFIED CHRONICITY: Primary | ICD-10-CM

## 2025-02-25 PROCEDURE — 99214 OFFICE O/P EST MOD 30 MIN: CPT | Mod: S$GLB,,, | Performed by: ORTHOPAEDIC SURGERY

## 2025-02-25 PROCEDURE — 99999 PR PBB SHADOW E&M-EST. PATIENT-LVL III: CPT | Mod: PBBFAC,,, | Performed by: ORTHOPAEDIC SURGERY

## 2025-02-25 PROCEDURE — 1159F MED LIST DOCD IN RCRD: CPT | Mod: CPTII,S$GLB,, | Performed by: ORTHOPAEDIC SURGERY

## 2025-02-25 PROCEDURE — 3008F BODY MASS INDEX DOCD: CPT | Mod: CPTII,S$GLB,, | Performed by: ORTHOPAEDIC SURGERY

## 2025-02-25 NOTE — PROGRESS NOTES
H&P  Orthopaedics    SUBJECTIVE:   Chief Complaint: R shoulder pain  Interval history 2/25/25 Patient is here today for a 6 week f/u of her right shoulder and right thumb trigger finger. She had a shoulder and thumb injection last visit. She states it only helped for a few weeks. She has a nodule on her right A1 pully of her thumb. She continues to have pain in her R shoulder and has completed PT.     Interval history 1/14/25 Patient report today to the clinic today to f/u on her R shoulder injection and right thumb trigger finger and cyst. She received a shoulder injection and a thumb injection last visit 12/3/24, she states the shoulder injection helped a little but the thumb continues to hurt. She continues to attend PT with success.   Interval history 12/03/2024  Patient returns to clinic for follow up of right shoulder pain as well as right trigger thumb.  With regards to right shoulder pain, at the last clinic visit we ordered an MRI of the right shoulder.  She also had a previous corticosteroid injection right subacromial space April 2024 however she does not recall this.  She reports lateral shoulder pain when she reaches overhead.  Since establishing care with us has not done physical therapy. With regards to right trigger thumb she has had 1 corticosteroid injection April 2024 which she states provided partial temporary relief but has experienced recurrence of symptoms was to try another corticosteroid injection today.      HPI interval 07/16/2024  Patient is here for follow-up chronic right shoulder pain.  Her last visit with us she received a right subacromial CSI which he states took sometime to work but had some relief thereafter.  She reports she did not have the time to attend physical therapy due to a an illness at that time.  However she states she was doing home exercises with bands.  She reports the pain of the right shoulder is mostly an anterior aspect it is constant and painful when raising up  her arm and internally rotating her right arm.  She denies any numbness or tingling.  She denies any trauma or falls.      History of Present Illness: 4/16/2024  Patient is a 49 y.o. female who presents with several year history of atraumatic right shoulder pain.  Describes it primarily over the anterolateral aspect of the shoulder that is sharp with intermittent burning pain that radiates USP down the upper arm.  Attempted a formal course of physical therapy without much improvement. She reports that the pain is worse with overhead activity. It also bothers her at night. Denies numbness or neck pain.  No hx of HEP, bracing, CSI, or Sx to RUE. They are RHD. 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  Of note, she was last seen 1 year ago in our clinic for a right trigger thumb for which she endorses no further triggering with only mild pain.  Her primary complaint today is her right shoulder.    Scheduled Meds:   LIDOcaine HCL 10 mg/ml (1%)  5 mL Other 1 time in Clinic/HOD    LIDOcaine-EPINEPHrine 1%-1:100,000 30 mL, LIDOcaine HCL 10 mg/ml (1%) 20 mL, sodium bicarbonate 10 mL in sodium chloride 0.9% 500 mL solution   MISCELLANEOUS 1 time in Clinic/HOD     Continuous Infusions:      PRN Meds:        Review of patient's allergies indicates:   Allergen Reactions    Fish containing products Anaphylaxis       Past Medical History:   Diagnosis Date    Allergy     Fibroids     Iron deficiency anemia, unspecified     Unspecified chronic bronchitis      Past Surgical History:   Procedure Laterality Date    BUNIONECTOMY Bilateral     COLONOSCOPY N/A 8/16/2023    Procedure: COLONOSCOPY;  Surgeon: Jacques Bautista MD;  Location: [facility];  Service: Endoscopy;  Laterality: N/A;    COLONOSCOPY W/ POLYPECTOMY  08/16/2023    HYSTEROSCOPY WITH DILATION AND CURETTAGE OF UTERUS N/A 12/27/2023    Procedure: HYSTEROSCOPY, WITH DILATION AND CURETTAGE OF UTERUS;  Surgeon: Zaida Land MD;  Location: [facility];  Service: OB/GYN;   Laterality: N/A;    TUBAL LIGATION       Family History   Problem Relation Name Age of Onset    Eczema Daughter Nicci     Allergic rhinitis Daughter Nicci     Breast cancer Maternal Aunt      Colon cancer Neg Hx      Ovarian cancer Neg Hx      Cancer Neg Hx      Uterine cancer Neg Hx      Cervical cancer Neg Hx       Social History     Tobacco Use    Smoking status: Never    Smokeless tobacco: Never   Substance Use Topics    Alcohol use: Yes     Comment: socially - monthly    Drug use: No        Review of Systems:  Patient denies constitutional symptoms, cardiac symptoms, respiratory symptoms, GI symptoms.  The remainder of the musculoskeletal ROS is included in the HPI.      OBJECTIVE:     Physical Exam:  Gen:  No acute distress  CV:  Peripherally well-perfused.  Pulses 2+ bilaterally.  Lungs:  Normal respiratory effort.  Abdomen:  Soft, non-tender, non-distended  Head/Neck:  Normocephalic.  Atraumatic. No TTP, AROM and PROM intact without pain  Neuro:  CN intact without deficit, SILT throughout B/L Upper & Lower Extremities    Right shoulder examination  No abnormality on inspection.  No scars, swelling, erythema.  No appreciable atrophy.  Tender to palpation over the long head of the biceps tendon in its groove.  Tender over the greater tuberosity.  Nontender over the glenohumeral joint line or the AC joint.  Active range of motion forward flexion 170, external rotation 60, internal rotation to L2.  Forward flexion past 90 and internal rotation are painful for her.  Passive range of motion forward flexion 180, external rotation 65.  5/5 strength supraspinatus, infraspinatus, subscapularis.  Motor and sensation intact all distributions axillary, radial, ain, median, ain, ulnar.  Positive Neer.  Positive Montes.  Positive Speed's.  Positive O'Briens.  Negative Bere.  Negative belly press.  Radial pulse 2 +brisk capillary refill all digits    Right hand examination  Tender to palpation over the thumb A1  pulley with a palpable nodule.  Able to make a full composite fist.  Motor and sensation intact.    Diagnostic Results:    Imaging - I independently viewed the patient's imaging as well as the radiology report.      Previous Xrays of the patient's right shoulder demonstrate no evidence of any acute fractures or dislocations or significant degenerative changes.  Well-maintained subacromial space and reduced glenohumeral joint.  Type 2 acromion.    X-rays of the right hand obtained in clinic today show no significant degenerative changes, no fracture no dislocation  ASSESSMENT/PLAN:     A/P: Sara Alcaraz is a 49 y.o. with chronic right shoulder pain due to subacromial impingement and biceps tendinopathy, as well as right trigger thumb with a nodule    Plan:   R shoulder arthroscope and Right thumb  trigger finger release   Consents were signed today

## 2025-02-27 NOTE — PROGRESS NOTES
Patient ID: Sara Alcaraz is a 49 y.o. female.    Chief Complaint: Pain of the Right Shoulder and Pain and Numbness of the Right Hand    History of Present Illness    CHIEF COMPLAINT:  Shoulder and thumb issues.    HPI:  Ms. Alcaraz presents for follow-up regarding shoulder and thumb issues. She reports ongoing shoulder pain and aching, as well as intermittent thumb pain. These symptoms appear to be impacting her daily activities. An MRI of the shoulder performed in December 2024 showed signs of wear and tear and a small tear in one of the larger muscles. Previous treatments for the shoulder have been ineffective. Surgical interventions have been discussed as a potential treatment option.    She denies being a smoker, having diabetes, or using steroids. She also denies ever having a bad reaction to anesthesia.    PREVIOUS TREATMENTS:  Ms. Alcaraz received a steroid injection for her shoulder.    SOCIAL HISTORY:  Ms. Alcaraz is not a smoker.    IMAGING:  An MRI of the shoulder was conducted in December 2024. The findings revealed significant wear and tear, along with a small tear in one of the larger muscles.      ROS:  General: denies fever, denies chills, denies fatigue, denies weight gain, denies weight loss  Eyes: denies vision changes, denies redness, denies discharge  ENT: denies ear pain, denies nasal congestion, denies sore throat  Cardiovascular: denies chest pain, denies palpitations, denies lower extremity edema  Respiratory: denies cough, denies shortness of breath  Gastrointestinal: denies abdominal pain, denies nausea, denies vomiting, denies diarrhea, denies constipation, denies blood in stool  Genitourinary: denies dysuria, denies hematuria, denies frequency  Musculoskeletal: reports joint pain, denies muscle pain  Skin: denies rash, denies lesion  Neurological: denies headache, denies dizziness, denies numbness, denies tingling  Psychiatric: denies anxiety, denies depression, denies sleep  difficulty         Physical Exam    Musculoskeletal: Large nodule in thumb.         Assessment & Plan     Ms. Alcaraz understands the risks and benefits and elects to proceed with trigger finger release surgery for the thumb and shoulder arthroscopy in mid-April.   Shoulder arthroscopy would involve general anesthesia and possible graft patch placement if necessary.              No follow-ups on file.    This note was generated with the assistance of ambient listening technology. Verbal consent was obtained by the patient and accompanying visitor(s) for the recording of patient appointment to facilitate this note. I attest to having reviewed and edited the generated note for accuracy, though some syntax or spelling errors may persist. Please contact the author of this note for any clarification.

## 2025-03-06 DIAGNOSIS — M79.641 RIGHT HAND PAIN: ICD-10-CM

## 2025-03-06 DIAGNOSIS — M65.30 TRIGGER FINGER OF RIGHT HAND, UNSPECIFIED FINGER: ICD-10-CM

## 2025-03-06 DIAGNOSIS — M25.511 RIGHT SHOULDER PAIN, UNSPECIFIED CHRONICITY: Primary | ICD-10-CM

## 2025-04-10 DIAGNOSIS — Z12.31 ENCOUNTER FOR SCREENING MAMMOGRAM FOR MALIGNANT NEOPLASM OF BREAST: Primary | ICD-10-CM

## 2025-04-16 ENCOUNTER — PATIENT MESSAGE (OUTPATIENT)
Dept: PREADMISSION TESTING | Facility: HOSPITAL | Age: 50
End: 2025-04-16
Payer: COMMERCIAL

## 2025-04-16 RX ORDER — NAPROXEN 500 MG/1
500 TABLET ORAL 2 TIMES DAILY
COMMUNITY
Start: 2024-10-24

## 2025-04-16 RX ORDER — CYCLOBENZAPRINE HCL 5 MG
TABLET ORAL
COMMUNITY

## 2025-04-16 RX ORDER — DICLOFENAC SODIUM 10 MG/G
2 GEL TOPICAL 4 TIMES DAILY
COMMUNITY
Start: 2024-10-24

## 2025-04-16 RX ORDER — MELOXICAM 7.5 MG/1
TABLET ORAL
COMMUNITY
Start: 2024-03-21

## 2025-04-16 NOTE — ANESTHESIA PAT ROS NOTE
04/16/2025  Sara Alcaraz is a 50 y.o., female.      Pre-op Assessment    I have reviewed the Patient Summary Reports.       I have reviewed the Medications.     Review of Systems  Anesthesia Hx:  No problems with previous Anesthesia   History of prior surgery of interest to airway management or planning:  Previous anesthesia: General, MAC 12/27/23 hysteroscopy, D & C with general anesthesia.  Procedure performed at an [facility].      8/16/23 colonoscopy with MAC.  Procedure performed at an [facility]. Airway issues documented on chart review include laryngeal mask airway used        Social:  Non-Smoker, Alcohol Use       Hematology/Oncology:       -- Anemia:                                  Cardiovascular:                    Venous insufficiency                           Musculoskeletal:     Right shoulder pain, unspecified chronicity            Endocrine:     prediabetes      Obesity / BMI > 30       Past Medical History:   Diagnosis Date    Allergy     Fibroids     Iron deficiency anemia, unspecified     Unspecified chronic bronchitis      Past Surgical History:   Procedure Laterality Date    BUNIONECTOMY Bilateral     COLONOSCOPY N/A 8/16/2023    Procedure: COLONOSCOPY;  Surgeon: Jacques Bautista MD;  Location: [facility];  Service: Endoscopy;  Laterality: N/A;    COLONOSCOPY W/ POLYPECTOMY  08/16/2023    HYSTEROSCOPY WITH DILATION AND CURETTAGE OF UTERUS N/A 12/27/2023    Procedure: HYSTEROSCOPY, WITH DILATION AND CURETTAGE OF UTERUS;  Surgeon: Zaida Land MD;  Location: [facility];  Service: OB/GYN;  Laterality: N/A;    TUBAL LIGATION       Anesthesia Assessment: Preoperative EQUATION    Planned Procedure: Procedure(s) (LRB):  RIGHT , ROTATOR CUFF, ARTHROSCOPIC (Right)  RIGHT THUMB , TRIGGER FINGER, RELEASE (Right)  Requested Anesthesia Type:General/Regional  Surgeon:  "Lida Reyes MD  Service: Orthopedics  Known or anticipated Date of Surgery:4/28/2025    Surgeon notes: reviewed    Electronic QUestionnaire Assessment completed via nurse interview with patient.      Triage considerations:     The patient has no apparent active cardiac condition (No unstable coronary Syndrome such as severe unstable angina or recent [<1 month] myocardial infarction, decompensated CHF, severe valvular   disease or significant arrhythmia)    Previous anesthesia records:LMA General, MAC, and No problems    Last PCP note: 6-12 months ago   Subspecialty notes: Ortho    Other important co-morbidities: Obesity   No EKG/Echo/Stress test available on chart.     Tests already available:  Results have been reviewed.             Instructions given. (See in Nurse's note) Pre op medication instructions sent via portal message.    Optimization:  Anesthesia Preop Clinic Assessment  Indicated    Medical Opinion Indicated: no       Sub-specialist consult indicated: no      Plan:   Pt has appt with Bria Mahoney in pre op clinic 4/23/25.    Navigation:  Results will be tracked by Preop Clinic.     Ht: 5'4"  Wt: 80 kg (176 lb)  BMI: 31.4  "

## 2025-04-17 ENCOUNTER — ANESTHESIA EVENT (OUTPATIENT)
Dept: SURGERY | Facility: HOSPITAL | Age: 50
End: 2025-04-17
Payer: COMMERCIAL

## 2025-04-23 ENCOUNTER — OFFICE VISIT (OUTPATIENT)
Dept: INTERNAL MEDICINE | Facility: CLINIC | Age: 50
End: 2025-04-23
Payer: COMMERCIAL

## 2025-04-23 ENCOUNTER — LAB VISIT (OUTPATIENT)
Dept: LAB | Facility: HOSPITAL | Age: 50
End: 2025-04-23
Payer: COMMERCIAL

## 2025-04-23 VITALS
BODY MASS INDEX: 30.92 KG/M2 | DIASTOLIC BLOOD PRESSURE: 76 MMHG | HEART RATE: 72 BPM | WEIGHT: 181.13 LBS | HEIGHT: 64 IN | OXYGEN SATURATION: 97 % | SYSTOLIC BLOOD PRESSURE: 140 MMHG | TEMPERATURE: 98 F

## 2025-04-23 DIAGNOSIS — I87.2 VENOUS INSUFFICIENCY: ICD-10-CM

## 2025-04-23 DIAGNOSIS — Z01.818 PREOPERATIVE EXAMINATION: ICD-10-CM

## 2025-04-23 DIAGNOSIS — Z01.818 PREOPERATIVE EXAMINATION: Primary | ICD-10-CM

## 2025-04-23 DIAGNOSIS — R06.83 SNORING: ICD-10-CM

## 2025-04-23 DIAGNOSIS — J42 CHRONIC BRONCHITIS, UNSPECIFIED CHRONIC BRONCHITIS TYPE: ICD-10-CM

## 2025-04-23 DIAGNOSIS — E66.811 CLASS 1 OBESITY WITHOUT SERIOUS COMORBIDITY WITH BODY MASS INDEX (BMI) OF 31.0 TO 31.9 IN ADULT, UNSPECIFIED OBESITY TYPE: ICD-10-CM

## 2025-04-23 DIAGNOSIS — M25.511 RIGHT SHOULDER PAIN, UNSPECIFIED CHRONICITY: ICD-10-CM

## 2025-04-23 DIAGNOSIS — R03.0 ELEVATED BP WITHOUT DIAGNOSIS OF HYPERTENSION: ICD-10-CM

## 2025-04-23 DIAGNOSIS — I83.813 VARICOSE VEINS OF BILATERAL LOWER EXTREMITIES WITH PAIN: ICD-10-CM

## 2025-04-23 LAB
ALBUMIN SERPL BCP-MCNC: 3.6 G/DL (ref 3.5–5.2)
ALP SERPL-CCNC: 100 UNIT/L (ref 40–150)
ALT SERPL W/O P-5'-P-CCNC: 17 UNIT/L (ref 10–44)
ANION GAP (OHS): 8 MMOL/L (ref 8–16)
AST SERPL-CCNC: 24 UNIT/L (ref 11–45)
BILIRUB SERPL-MCNC: 0.2 MG/DL (ref 0.1–1)
BUN SERPL-MCNC: 11 MG/DL (ref 6–20)
CALCIUM SERPL-MCNC: 9.3 MG/DL (ref 8.7–10.5)
CHLORIDE SERPL-SCNC: 106 MMOL/L (ref 95–110)
CO2 SERPL-SCNC: 27 MMOL/L (ref 23–29)
CREAT SERPL-MCNC: 0.8 MG/DL (ref 0.5–1.4)
ERYTHROCYTE [DISTWIDTH] IN BLOOD BY AUTOMATED COUNT: 12.9 % (ref 11.5–14.5)
GFR SERPLBLD CREATININE-BSD FMLA CKD-EPI: >60 ML/MIN/1.73/M2
GLUCOSE SERPL-MCNC: 77 MG/DL (ref 70–110)
HCT VFR BLD AUTO: 40.1 % (ref 37–48.5)
HGB BLD-MCNC: 13.3 GM/DL (ref 12–16)
MCH RBC QN AUTO: 31.7 PG (ref 27–31)
MCHC RBC AUTO-ENTMCNC: 33.2 G/DL (ref 32–36)
MCV RBC AUTO: 96 FL (ref 82–98)
PLATELET # BLD AUTO: 173 K/UL (ref 150–450)
PMV BLD AUTO: 11.4 FL (ref 9.2–12.9)
POTASSIUM SERPL-SCNC: 3.9 MMOL/L (ref 3.5–5.1)
PROT SERPL-MCNC: 7.4 GM/DL (ref 6–8.4)
RBC # BLD AUTO: 4.2 M/UL (ref 4–5.4)
SODIUM SERPL-SCNC: 141 MMOL/L (ref 136–145)
WBC # BLD AUTO: 6.29 K/UL (ref 3.9–12.7)

## 2025-04-23 PROCEDURE — 99999 PR PBB SHADOW E&M-EST. PATIENT-LVL IV: CPT | Mod: PBBFAC,,, | Performed by: NURSE PRACTITIONER

## 2025-04-23 PROCEDURE — 99205 OFFICE O/P NEW HI 60 MIN: CPT | Mod: S$GLB,,, | Performed by: NURSE PRACTITIONER

## 2025-04-23 PROCEDURE — 1160F RVW MEDS BY RX/DR IN RCRD: CPT | Mod: CPTII,S$GLB,, | Performed by: NURSE PRACTITIONER

## 2025-04-23 PROCEDURE — 3078F DIAST BP <80 MM HG: CPT | Mod: CPTII,S$GLB,, | Performed by: NURSE PRACTITIONER

## 2025-04-23 PROCEDURE — 3008F BODY MASS INDEX DOCD: CPT | Mod: CPTII,S$GLB,, | Performed by: NURSE PRACTITIONER

## 2025-04-23 PROCEDURE — 80053 COMPREHEN METABOLIC PANEL: CPT

## 2025-04-23 PROCEDURE — 85027 COMPLETE CBC AUTOMATED: CPT

## 2025-04-23 PROCEDURE — 36415 COLL VENOUS BLD VENIPUNCTURE: CPT

## 2025-04-23 PROCEDURE — 1159F MED LIST DOCD IN RCRD: CPT | Mod: CPTII,S$GLB,, | Performed by: NURSE PRACTITIONER

## 2025-04-23 PROCEDURE — 3077F SYST BP >= 140 MM HG: CPT | Mod: CPTII,S$GLB,, | Performed by: NURSE PRACTITIONER

## 2025-04-23 NOTE — PROGRESS NOTES
[facility]  Progress Note    Patient Name: Sara Alcaraz  MRN: [id number]  Date of Evaluation- 04/24/2025  PCP- Gene Kaur Jr., NP    Future cases for Joan Alcaraz [[id number]]       Case ID Status Date Time Rafa Procedure Provider Location    [id number] [facility] 4/28/2025  8:05  RIGHT , ROTATOR CUFF, ARTHROSCOPIC Mitchel-Lida Jackson MD [[id number]] [facility] OR            HPI:  This is a 50 y.o. female  who presents today with  for a preoperative evaluation in preparation for right rotator cuff arthroscopy and right thumb trigger finger.   Surgery is indicated for     .   Patient is new to me.  The history has been obtained by speaking with the patient and reviewing the electronic medical record and/or outside health information. Significant health conditions for the perioperative period are discussed below in assessment and plan.   Patient reports current health status to be tired.  Denies any new symptoms before surgery.       Subjective/ Objective:     Chief Complaint: Preoperative evaulation, perioperative medical management, and complication reduction plan.     Functional Capacity: plenty of walking, lifting, and squatting while working at [employer]- can do without CP/SOB.      Anesthesia issues: None    Difficulty mouth opening: right side of jaw    Steroid use in the last 12 months:  right shoulder/hand     Dental Issues: None    Family anesthesia difficulty: None     Family Hx of Thrombosis: None          Past Medical History:   Diagnosis Date    Allergy     Fibroids     Iron deficiency anemia, unspecified     Unspecified chronic bronchitis          Past Medical History Pertinent Negatives:   Diagnosis Date Noted    Abnormal Pap smear of cervix 08/03/2023    Angio-edema 04/16/2018    Anxiety 04/23/2025    Asthma 04/16/2018    Coronary artery disease 04/23/2025    Deep vein thrombosis 04/23/2025    Depression 04/23/2025    Diabetes mellitus, type 2 04/23/2025    Disorder of kidney  and ureter 04/23/2025    Eczema 04/16/2018    Hypertension 04/23/2025    Myocardial infarction 04/23/2025    Pulmonary embolism 04/23/2025    Seizures 04/23/2025    Stroke 04/23/2025    Thyroid disease 04/23/2025    Urticaria 04/16/2018         Past Surgical History:   Procedure Laterality Date    BUNIONECTOMY Bilateral     COLONOSCOPY N/A 8/16/2023    Procedure: COLONOSCOPY;  Surgeon: Jacques Bautista MD;  Location: [facility];  Service: Endoscopy;  Laterality: N/A;    COLONOSCOPY W/ POLYPECTOMY  08/16/2023    HYSTEROSCOPY WITH DILATION AND CURETTAGE OF UTERUS N/A 12/27/2023    Procedure: HYSTEROSCOPY, WITH DILATION AND CURETTAGE OF UTERUS;  Surgeon: Zaida Land MD;  Location: [facility];  Service: OB/GYN;  Laterality: N/A;    TUBAL LIGATION         Review of Systems   Constitutional:  Negative for chills, fatigue, fever and unexpected weight change.   HENT:  Positive for congestion (allergies). Negative for hearing loss, rhinorrhea, sore throat, tinnitus and trouble swallowing.    Eyes:  Negative for visual disturbance.   Respiratory:  Negative for cough, chest tightness, shortness of breath and wheezing.         STOP BANG risk factors:  Snoring   Cardiovascular:  Positive for leg swelling (right- occasional). Negative for chest pain and palpitations.   Gastrointestinal:  Negative for constipation and diarrhea.        Denies Fatty liver, Hepatitis  Abdominal tenderness-right; attributes to uterine fibroids   Genitourinary:  Negative for decreased urine volume, difficulty urinating, dysuria, frequency, hematuria and urgency.   Musculoskeletal:  Positive for arthralgias (right shoulder) and neck pain. Negative for back pain and neck stiffness.   Allergic/Immunologic: Positive for environmental allergies.   Neurological:  Positive for numbness (right hand). Negative for dizziness, syncope, weakness and headaches.   Hematological:  Bruises/bleeds easily.   Psychiatric/Behavioral:  Negative for sleep  "disturbance and suicidal ideas.               VITALS  Visit Vitals  BP (!) 140/76 (BP Location: Right arm, Patient Position: Sitting)   Pulse 72   Temp 98 °F (36.7 °C) (Oral)   Ht 5' 4" (1.626 m)   Wt 82.2 kg (181 lb 1.7 oz)   LMP 10/11/2021 (Within Days)   SpO2 97%   BMI 31.09 kg/m²          Physical Exam  Vitals reviewed.   Constitutional:       General: She is not in acute distress.     Appearance: She is well-developed. She is obese.   HENT:      Head: Normocephalic.      Nose: Nose normal.      Mouth/Throat:      Pharynx: No oropharyngeal exudate.   Eyes:      General:         Right eye: No discharge.         Left eye: No discharge.      Conjunctiva/sclera: Conjunctivae normal.      Pupils: Pupils are equal, round, and reactive to light.   Neck:      Thyroid: No thyromegaly.      Vascular: No carotid bruit or JVD.      Trachea: No tracheal deviation.   Cardiovascular:      Rate and Rhythm: Normal rate and regular rhythm.      Pulses:           Carotid pulses are 2+ on the right side and 2+ on the left side.       Dorsalis pedis pulses are 2+ on the right side and 2+ on the left side.        Posterior tibial pulses are 2+ on the right side and 2+ on the left side.      Heart sounds: Normal heart sounds. No murmur heard.  Pulmonary:      Effort: Pulmonary effort is normal. No respiratory distress.      Breath sounds: Normal breath sounds. No stridor. No wheezing, rhonchi or rales.   Abdominal:      General: Bowel sounds are normal. There is no distension.      Palpations: Abdomen is soft.      Tenderness: There is abdominal tenderness in the right lower quadrant. There is no guarding.   Musculoskeletal:      Cervical back: Normal range of motion. No pain with movement.      Right lower leg: No edema.      Left lower leg: No edema.   Lymphadenopathy:      Cervical: No cervical adenopathy.   Skin:     General: Skin is warm and dry.      Capillary Refill: Capillary refill takes less than 2 seconds.      Findings: No " erythema or rash.   Neurological:      Mental Status: She is alert and oriented to person, place, and time.   Psychiatric:         Behavior: Behavior normal. Behavior is cooperative.          Significant Labs:  Lab Results   Component Value Date    WBC 6.29 04/23/2025    HGB 13.3 04/23/2025    HCT 40.1 04/23/2025     04/23/2025    ALT 17 04/23/2025    AST 24 04/23/2025     04/23/2025    K 3.9 04/23/2025     04/23/2025    CREATININE 0.8 04/23/2025    BUN 11 04/23/2025    CO2 27 04/23/2025               Active Cardiac Conditions: None        Revised Cardiac Risk Index   High -Risk Surgery  Intraperitoneal; Intrathoracic; suprainguinal vascular Yes- + 1 No- 0   History of Ischemic Heart Disease   (Hx of MI/positive exercise test/current chest pain due to ischemia/use of nitrate therapy/EKG with pathological Q waves) Yes- + 1 No- 0   History of CHF  (Pulmonary edema/bilateral rales or S3 gallop/PND/CXR showing pulmonary vascular redistribution) Yes- + 1 No- 0   History of CVA   (Prior stroke or TIA) Yes- + 1 No- 0   Pre-operative treatment with insulin Yes- + 1 No- 0   Pre-operative creatinine > 2mg/dl Yes- + 1 No- 0   Total: 0      Risk Status:  Estimated risk of cardiac complications after non-cardiac surgery using the Revised Cardiac Risk Index for Preoperative risk is 3.9 %      ARISCAT (Canet) risk index: Low: 1.6% risk of post-op pulmonary complications; Intermediate: 13.3% risk of post-op pulmonary complications if surgery is > 3 hours.     American Society of Anesthesiologists Physical Status classification (ASA): 2    [facility] respiratory failure index: 0.5 %    VU postop respiratory failure risk (For General Anesthesia):               Orders Placed This Encounter    Comprehensive Metabolic Panel    CBC Without Differential    Ambulatory referral/consult to Sleep Disorders           Assessment/Plan:     Right shoulder pain  Scheduled with Dr. Reyes 4/28/25 for right rotator cuff  "repair.    Venous insufficiency  S/P R GSV EVLT 3/2024  Last seen by Vascular Surgery 6/17/24  Recommended to maintain healthy weight, exercise, use of compression stockings, elevate legs,  and avoid sitting or standing for prolonged periods of time.           Varicose veins of bilateral lower extremities with pain  Suggest compression stockings while awake to reduce leg fatigue, swelling, or pain.   Avoid sitting or standing for prolong periods of time.  Exercise can help improve leg strength and circulation  Elevate feet to at least heart level for 30 minutes approximately 4 times daily    Increased risk of thrombosis.    US BLE: 7/3/23  Impression   =========     Right Leg: Duplex imaging of the right lower extremity veins demonstrates patent and compressible veins.   There is no evidence of a venous thrombosis in the deep or superficial veins. Branching is noted throughout the course of the GSV.   Significant reflux noted in the right GSV including the Saphenofemoral Junction (SFJ). No significant reflux noted in the SSV or   accessory vein.     Left Leg: Duplex imaging of the left lower extremity veins demonstrates patent and compressible veins.   There is no evidence of a venous thrombosis in the deep or superficial veins.   No significant reflux noted in the left GSV including the Saphenofemoral Junction (SFJ) or the SSV including the Saphenopopliteal   Junction (SPJ). No reflux noted in the Accessory Vein.       DATE OF SERVICE: 07/03/2023                                                       Sonographer: Evelia Hernandez   Electronically Signed by: BALAJI Bar M.D. at 07/03/2023-14:37        Chronic bronchitis  Treated with  albuterol inhaler prn  .   Last use: " a couple of weeks ago" for wheezing.    Sats today: 97%  Managed per PCP  Denies intubations or hospitalizations  No PFTs available        Snoring  Denies BRYNN. Possible sleep apnea: recommend caution with sedating medication in the " perioperative period.   Sleep Study referral placed    Class 1 obesity without serious comorbidity with body mass index (BMI) of 31.0 to 31.9 in adult  Patient would benefit from weight loss and has not set goals to achieve success.   Lifestyle changes should be made by eating healthy, exercising at least 150 minutes weekly, and avoiding sedentary behavior.     Recommendations:   Stay active and exercise using cardio, stretching, and weights. Exercise at least 30 minutes 5x weekly. Try to get at least 7500-10,000 steps daily. Recommend Mediterranean diet and avoid fast food, decrease salt intake and carbohydrates such as bread, potatoes, sugary snacks/sodas, cakes and cookies.    Elevated BP without diagnosis of hypertension  BP today: 140/76  Clinic readings since April 2024 available: Systolic range (117-142 ) and Diastolic range (74-86  )  Denies headaches/urine volume changes/dizziness/syncope/vision changes.  Recommend follow-up with PCP for continued BP monitoring.        Discussion/Management of Perioperative Care    Thromboembolic prophylaxis (VTE) Care: Risk factors for thrombosis include: obesity and surgical procedure.  I recommend prophylaxis of thromboembolism with the use of compression stockings/pneumatic devices, and/or pharmacologic agents. The benefits should outweigh the risks for pharmacologic prophylaxis in the perioperative period. I also encourage early ambulation if not contraindicated during the post-operative period.    Risk factors for post-operative pulmonary complications include:chronic bronchitis. To reduce the risk of pulmonary complications, prophylactic recommendations include: use of short acting beta agonist within 30 minutes of intubation, incentive spirometry use/deep breathing, end tidal carbon dioxide monitoring, and pain control.      To reduce the risk of postoperative renal complications, I recommend the patient maintain adequate hydration.  Avoid/reduce NSAIDS and LITTLEJOHN-2  inhibitors use as well as IV contrast for renal protection.    I recommend the use of appropriate prophylactic antibiotics to reduce the risk of surgical site infections.             This visit was focused on Preoperative evaluation, Perioperative Medical management, complication reduction plans. I suggest that the patient follows up with primary care or relevant sub specialists for ongoing health care.    I appreciate the opportunity to be involved in this patients care. Please feel free to contact me if there were any questions about this consultation.        I spent a total of 60 minutes on the day of the visit.  This includes face to face time and non-face to face time preparing to see the patient (e.g., review of tests), obtaining and/or reviewing separately obtained history, documenting clinical information in the electronic or other health record, independently interpreting results and communicating results to the patient/family/caregiver, or care coordinator.       Patient is optimized for surgery.      Bria Mahoney NP  Perioperative Medicine  [facility]

## 2025-04-23 NOTE — ASSESSMENT & PLAN NOTE
Denies BRYNN. Possible sleep apnea: recommend caution with sedating medication in the perioperative period.   Sleep Study referral placed

## 2025-04-23 NOTE — ASSESSMENT & PLAN NOTE
Suggest compression stockings while awake to reduce leg fatigue, swelling, or pain.   Avoid sitting or standing for prolong periods of time.  Exercise can help improve leg strength and circulation  Elevate feet to at least heart level for 30 minutes approximately 4 times daily    Increased risk of thrombosis.    US BLE: 7/3/23  Impression   =========     Right Leg: Duplex imaging of the right lower extremity veins demonstrates patent and compressible veins.   There is no evidence of a venous thrombosis in the deep or superficial veins. Branching is noted throughout the course of the GSV.   Significant reflux noted in the right GSV including the Saphenofemoral Junction (SFJ). No significant reflux noted in the SSV or   accessory vein.     Left Leg: Duplex imaging of the left lower extremity veins demonstrates patent and compressible veins.   There is no evidence of a venous thrombosis in the deep or superficial veins.   No significant reflux noted in the left GSV including the Saphenofemoral Junction (SFJ) or the SSV including the Saphenopopliteal   Junction (SPJ). No reflux noted in the Accessory Vein.       DATE OF SERVICE: 07/03/2023                                                       Sonographer: Evelia Hernandez   Electronically Signed by: BALAJI Bar M.D. at 07/03/2023-14:37

## 2025-04-23 NOTE — HPI
Pt reports vaginal itching and irritation after changing soaps This is a 50 y.o. female  who presents today with  for a preoperative evaluation in preparation for right rotator cuff arthroscopy and right thumb trigger finger.   Surgery is indicated for     .   Patient is new to me.  The history has been obtained by speaking with the patient and reviewing the electronic medical record and/or outside health information. Significant health conditions for the perioperative period are discussed below in assessment and plan.   Patient reports current health status to be tired.  Denies any new symptoms before surgery.

## 2025-04-23 NOTE — ASSESSMENT & PLAN NOTE
Patient would benefit from weight loss and has not set goals to achieve success.   Lifestyle changes should be made by eating healthy, exercising at least 150 minutes weekly, and avoiding sedentary behavior.     Recommendations:   Stay active and exercise using cardio, stretching, and weights. Exercise at least 30 minutes 5x weekly. Try to get at least 7500-10,000 steps daily. Recommend Mediterranean diet and avoid fast food, decrease salt intake and carbohydrates such as bread, potatoes, sugary snacks/sodas, cakes and cookies.

## 2025-04-23 NOTE — ASSESSMENT & PLAN NOTE
"Treated with  albuterol inhaler prn  .   Last use: " a couple of weeks ago" for wheezing.    Sats today: 97%  Managed per PCP  Denies intubations or hospitalizations  No PFTs available      "

## 2025-04-23 NOTE — ASSESSMENT & PLAN NOTE
BP today: 140/76  Clinic readings since April 2024 available: Systolic range (117-142 ) and Diastolic range (74-86  )  Denies headaches/urine volume changes/dizziness/syncope/vision changes.  Recommend follow-up with PCP for continued BP monitoring.

## 2025-04-23 NOTE — ASSESSMENT & PLAN NOTE
S/P R GSV EVLT 3/2024  Last seen by Vascular Surgery 6/17/24  Recommended to maintain healthy weight, exercise, use of compression stockings, elevate legs,  and avoid sitting or standing for prolonged periods of time.

## 2025-04-23 NOTE — DISCHARGE INSTRUCTIONS
.Your surgery has been scheduled for:____4/28/25______________________________________    You should report to:  ____[facility], located on the [place] side of the first floor of the           [facility] ([phone])  ____The Second Floor Surgery Center, located on the [facility] side of the            Second floor of the [facility] ([phone])  ____3rd Floor [facility]U located on the [facility] side of the [facility] [phone]  __X__[facility]: located at [address] [facility] [place] [address]. [address].     Please Note   Tell your doctor if you take Aspirin, products containing Aspirin, herbal medications  or blood thinners, such as Coumadin, Ticlid, or Plavix.  (Consult your provider regarding holding or stopping before surgery).  Arrange for someone to drive you home following surgery.  You will not be allowed to leave the surgical facility alone or drive yourself home following sedation and anesthesia.    Before Surgery  Stop taking all herbal medications, vitamins, and supplements 7 days prior to surgery  No Motrin/Advil (Ibuprofen) 7 days before surgery  No Aleve (Naproxen) 7 days before surgery   No Goody's/BC Powder 7 days before surgery  Refrain from drinking alcoholic beverages for 24 hours before and after surgery  Stop or limit smoking at least 24 hours prior to surgery  You may take Tylenol for pain    Night before Surgery  Do not eat or drink after midnight  Take a shower or bath (shower is recommended).  Bathe with Hibiclens soap or an antibacterial soap from the neck down.  If not supplied by your surgeon, hibiclens soap will need to be purchased over the counter in pharmacy.  Rinse soap off thoroughly.  Shampoo your hair with your regular shampoo    The Day of Surgery  Take another bath or shower with hibiclens or any antibacterial soap, to reduce the chance of infection.  Take heart  and blood pressure medications with a small sip of water, as advised by the perioperative team.  Do not take fluid pills  You may brush your teeth and rinse your mouth, but do not swallow any additional water.   Do not apply perfumes, powder, body lotions or deodorant on the day of surgery.  Nail polish should be removed.  Do not wear makeup or moisturizer  Wear comfortable clothes, such as a button front shirt and loose fitting pants.  Leave all jewelry, including body piercings, and valuables at home.    Bring any devices you will need after surgery such as crutches or canes.  If you have sleep apnea, please bring your CPAP machine  In the event that your physical condition changes including the onset of a cold or respiratory illness, or if you have to delay or cancel your surgery, please notify your surgeon.

## 2025-04-23 NOTE — OUTPATIENT SUBJECTIVE & OBJECTIVE
Outpatient Subjective & Objective      Chief Complaint: Preoperative evaulation, perioperative medical management, and complication reduction plan.     Functional Capacity: plenty of walking, lifting, and squatting while working at [employer]- can do without CP/SOB.      Anesthesia issues: None    Difficulty mouth opening: right side of jaw    Steroid use in the last 12 months:  right shoulder/hand     Dental Issues: None    Family anesthesia difficulty: None     Family Hx of Thrombosis: None          Past Medical History:   Diagnosis Date    Allergy     Fibroids     Iron deficiency anemia, unspecified     Unspecified chronic bronchitis          Past Medical History Pertinent Negatives:   Diagnosis Date Noted    Abnormal Pap smear of cervix 08/03/2023    Angio-edema 04/16/2018    Anxiety 04/23/2025    Asthma 04/16/2018    Coronary artery disease 04/23/2025    Deep vein thrombosis 04/23/2025    Depression 04/23/2025    Diabetes mellitus, type 2 04/23/2025    Disorder of kidney and ureter 04/23/2025    Eczema 04/16/2018    Hypertension 04/23/2025    Myocardial infarction 04/23/2025    Pulmonary embolism 04/23/2025    Seizures 04/23/2025    Stroke 04/23/2025    Thyroid disease 04/23/2025    Urticaria 04/16/2018         Past Surgical History:   Procedure Laterality Date    BUNIONECTOMY Bilateral     COLONOSCOPY N/A 8/16/2023    Procedure: COLONOSCOPY;  Surgeon: Jacques Bautista MD;  Location: [facility];  Service: Endoscopy;  Laterality: N/A;    COLONOSCOPY W/ POLYPECTOMY  08/16/2023    HYSTEROSCOPY WITH DILATION AND CURETTAGE OF UTERUS N/A 12/27/2023    Procedure: HYSTEROSCOPY, WITH DILATION AND CURETTAGE OF UTERUS;  Surgeon: Zaida Land MD;  [facility];  Service: OB/GYN;  Laterality: N/A;    TUBAL LIGATION         Review of Systems   Constitutional:  Negative for chills, fatigue, fever and unexpected weight change.   HENT:  Positive for congestion (allergies). Negative for hearing loss, rhinorrhea, sore  "throat, tinnitus and trouble swallowing.    Eyes:  Negative for visual disturbance.   Respiratory:  Negative for cough, chest tightness, shortness of breath and wheezing.         STOP BANG risk factors:  Snoring   Cardiovascular:  Positive for leg swelling (right- occasional). Negative for chest pain and palpitations.   Gastrointestinal:  Negative for constipation and diarrhea.        Denies Fatty liver, Hepatitis  Abdominal tenderness-right; attributes to uterine fibroids   Genitourinary:  Negative for decreased urine volume, difficulty urinating, dysuria, frequency, hematuria and urgency.   Musculoskeletal:  Positive for arthralgias (right shoulder) and neck pain. Negative for back pain and neck stiffness.   Allergic/Immunologic: Positive for environmental allergies.   Neurological:  Positive for numbness (right hand). Negative for dizziness, syncope, weakness and headaches.   Hematological:  Bruises/bleeds easily.   Psychiatric/Behavioral:  Negative for sleep disturbance and suicidal ideas.               VITALS  Visit Vitals  BP (!) 140/76 (BP Location: Right arm, Patient Position: Sitting)   Pulse 72   Temp 98 °F (36.7 °C) (Oral)   Ht 5' 4" (1.626 m)   Wt 82.2 kg (181 lb 1.7 oz)   LMP 10/11/2021 (Within Days)   SpO2 97%   BMI 31.09 kg/m²          Physical Exam  Vitals reviewed.   Constitutional:       General: She is not in acute distress.     Appearance: She is well-developed. She is obese.   HENT:      Head: Normocephalic.      Nose: Nose normal.      Mouth/Throat:      Pharynx: No oropharyngeal exudate.   Eyes:      General:         Right eye: No discharge.         Left eye: No discharge.      Conjunctiva/sclera: Conjunctivae normal.      Pupils: Pupils are equal, round, and reactive to light.   Neck:      Thyroid: No thyromegaly.      Vascular: No carotid bruit or JVD.      Trachea: No tracheal deviation.   Cardiovascular:      Rate and Rhythm: Normal rate and regular rhythm.      Pulses:           Carotid " pulses are 2+ on the right side and 2+ on the left side.       Dorsalis pedis pulses are 2+ on the right side and 2+ on the left side.        Posterior tibial pulses are 2+ on the right side and 2+ on the left side.      Heart sounds: Normal heart sounds. No murmur heard.  Pulmonary:      Effort: Pulmonary effort is normal. No respiratory distress.      Breath sounds: Normal breath sounds. No stridor. No wheezing, rhonchi or rales.   Abdominal:      General: Bowel sounds are normal. There is no distension.      Palpations: Abdomen is soft.      Tenderness: There is abdominal tenderness in the right lower quadrant. There is no guarding.   Musculoskeletal:      Cervical back: Normal range of motion. No pain with movement.      Right lower leg: No edema.      Left lower leg: No edema.   Lymphadenopathy:      Cervical: No cervical adenopathy.   Skin:     General: Skin is warm and dry.      Capillary Refill: Capillary refill takes less than 2 seconds.      Findings: No erythema or rash.   Neurological:      Mental Status: She is alert and oriented to person, place, and time.   Psychiatric:         Behavior: Behavior normal. Behavior is cooperative.          Significant Labs:  Lab Results   Component Value Date    WBC 6.29 04/23/2025    HGB 13.3 04/23/2025    HCT 40.1 04/23/2025     04/23/2025    ALT 17 04/23/2025    AST 24 04/23/2025     04/23/2025    K 3.9 04/23/2025     04/23/2025    CREATININE 0.8 04/23/2025    BUN 11 04/23/2025    CO2 27 04/23/2025               Active Cardiac Conditions: None        Revised Cardiac Risk Index   High -Risk Surgery  Intraperitoneal; Intrathoracic; suprainguinal vascular Yes- + 1 No- 0   History of Ischemic Heart Disease   (Hx of MI/positive exercise test/current chest pain due to ischemia/use of nitrate therapy/EKG with pathological Q waves) Yes- + 1 No- 0   History of CHF  (Pulmonary edema/bilateral rales or S3 gallop/PND/CXR showing pulmonary vascular  redistribution) Yes- + 1 No- 0   History of CVA   (Prior stroke or TIA) Yes- + 1 No- 0   Pre-operative treatment with insulin Yes- + 1 No- 0   Pre-operative creatinine > 2mg/dl Yes- + 1 No- 0   Total: 0      Risk Status:  Estimated risk of cardiac complications after non-cardiac surgery using the Revised Cardiac Risk Index for Preoperative risk is 3.9 %      ARISCAT (Canet) risk index: Low: 1.6% risk of post-op pulmonary complications; Intermediate: 13.3% risk of post-op pulmonary complications if surgery is > 3 hours.     American Society of Anesthesiologists Physical Status classification (ASA): 2    John respiratory failure index: 0.5 %    MIRELLA postop respiratory failure risk (For General Anesthesia):         Outpatient Subjective & Objective

## 2025-04-24 ENCOUNTER — TELEPHONE (OUTPATIENT)
Dept: ORTHOPEDICS | Facility: CLINIC | Age: 50
End: 2025-04-24
Payer: COMMERCIAL

## 2025-04-24 NOTE — TELEPHONE ENCOUNTER
Spoke c pt. Informed pt of 7:30 a.m. arrival time for 04/28/25 surgery at the [facility]. Reminded pt of NPO status & PO appt. Confirmed no scrates, scrapes, open wounds on the skin. Pt expressed understanding & was thankful.

## 2025-04-25 PROBLEM — M65.30 TRIGGER FINGER OF RIGHT HAND: Status: ACTIVE | Noted: 2025-04-25

## 2025-04-25 RX ORDER — GABAPENTIN 100 MG/1
100 CAPSULE ORAL 3 TIMES DAILY
Qty: 45 CAPSULE | Refills: 0 | Status: SHIPPED | OUTPATIENT
Start: 2025-04-25 | End: 2025-05-13

## 2025-04-25 RX ORDER — ACETAMINOPHEN 500 MG
1000 TABLET ORAL EVERY 8 HOURS
Qty: 90 TABLET | Refills: 0 | Status: SHIPPED | OUTPATIENT
Start: 2025-04-25

## 2025-04-25 RX ORDER — CELECOXIB 100 MG/1
100 CAPSULE ORAL 2 TIMES DAILY
Qty: 28 CAPSULE | Refills: 0 | Status: SHIPPED | OUTPATIENT
Start: 2025-04-25

## 2025-04-25 RX ORDER — METHOCARBAMOL 500 MG/1
1000 TABLET, FILM COATED ORAL 3 TIMES DAILY
Qty: 90 TABLET | Refills: 0 | Status: SHIPPED | OUTPATIENT
Start: 2025-04-25 | End: 2025-05-13

## 2025-04-25 RX ORDER — OXYCODONE HYDROCHLORIDE 5 MG/1
5 TABLET ORAL EVERY 6 HOURS PRN
Qty: 24 TABLET | Refills: 0 | Status: SHIPPED | OUTPATIENT
Start: 2025-04-25

## 2025-04-26 NOTE — ANESTHESIA PREPROCEDURE EVALUATION
04/25/2025    Sara Alcaraz is a 50 y.o. female  who presents  for Procedure(s):  RIGHT , ROTATOR CUFF, ARTHROSCOPIC  RIGHT THUMB , TRIGGER FINGER, RELEASE        Review of patient's allergies indicates:   Allergen Reactions    Fish containing products Anaphylaxis       Wt Readings from Last 1 Encounters:   04/23/25 1446 82.2 kg (181 lb 1.7 oz)       BP Readings from Last 3 Encounters:   04/23/25 (!) 140/76   06/17/24 (!) 142/83   05/06/24 135/86       Patient Active Problem List    Diagnosis Date Noted    Trigger finger of right hand 04/25/2025    Right shoulder pain 04/23/2025    Chronic bronchitis 04/23/2025    Snoring 04/23/2025    Class 1 obesity without serious comorbidity with body mass index (BMI) of 31.0 to 31.9 in adult 04/23/2025    Elevated BP without diagnosis of hypertension 04/23/2025    Status post hysteroscopy, dilation and curettage 12/27/2023    Viral URI with cough 09/28/2023    Pharyngitis 09/28/2023    Varicose veins of bilateral lower extremities with pain 07/03/2023    Venous insufficiency 07/03/2023    Left leg pain 07/03/2023    Pain of right thumb 02/20/2023    Decreased range of motion of right thumb 02/20/2023        Past Surgical History:   Procedure Laterality Date    BUNIONECTOMY Bilateral     COLONOSCOPY N/A 8/16/2023    Procedure: COLONOSCOPY;  Surgeon: Jacques Bautista MD;  Location: [facility];  Service: Endoscopy;  Laterality: N/A;    COLONOSCOPY W/ POLYPECTOMY  08/16/2023    HYSTEROSCOPY WITH DILATION AND CURETTAGE OF UTERUS N/A 12/27/2023    Procedure: HYSTEROSCOPY, WITH DILATION AND CURETTAGE OF UTERUS;  Surgeon: Zaida Land MD;  Location: [facility] OR;  Service: OB/GYN;  Laterality: N/A;    TUBAL LIGATION         Social History[1]      Chemistry        Component Value Date/Time     04/23/2025 1537    K 3.9 04/23/2025 1537     04/23/2025 1537    CO2 27 04/23/2025 1537    BUN 11 04/23/2025 1537    CREATININE 0.8 04/23/2025 1537        Component Value  "Date/Time    CALCIUM 9.3 04/23/2025 1537    ALKPHOS 100 04/23/2025 1537    AST 24 04/23/2025 1537    ALT 17 04/23/2025 1537    BILITOT 0.2 04/23/2025 1537            Lab Results   Component Value Date    WBC 6.29 04/23/2025    HGB 13.3 04/23/2025    HCT 40.1 04/23/2025     04/23/2025    ALT 17 04/23/2025    AST 24 04/23/2025     04/23/2025    K 3.9 04/23/2025     04/23/2025    CREATININE 0.8 04/23/2025    BUN 11 04/23/2025    CO2 27 04/23/2025       No results for input(s): "PT", "INR", "PROTIME", "APTT" in the last 72 hours.    No results found for this or any previous visit.       Pre-op Assessment    I have reviewed the Patient Summary Reports.     I have reviewed the Nursing Notes. I have reviewed the NPO Status.   I have reviewed the Medications.     Review of Systems  Anesthesia Hx:  No problems with previous Anesthesia   History of prior surgery of interest to airway management or planning:  Previous anesthesia: General, MAC 12/27/23 hysteroscopy, D & C with general anesthesia.  Procedure performed at an [facility].      8/16/23 colonoscopy with MAC.  Procedure performed at an [facility]. Airway issues documented on chart review include laryngeal mask airway used       Denies Personal Hx of Anesthesia complications.                    Social:  Non-Smoker, Alcohol Use       Hematology/Oncology:       -- Anemia:                                  Cardiovascular:                    Venous insufficiency                           Musculoskeletal:     Right shoulder pain, unspecified chronicity            Endocrine:     prediabetes      Obesity / BMI > 30      Physical Exam  General: Well nourished and Cooperative    Airway:  Mallampati: II   Mouth Opening: Normal  TM Distance: Normal  Tongue: Normal  Neck ROM: Normal ROM    Dental:  Intact      Past Medical History:   Diagnosis Date    Allergy     Fibroids     Iron deficiency anemia, unspecified     Unspecified chronic bronchitis  " "    Past Surgical History:   Procedure Laterality Date    BUNIONECTOMY Bilateral     COLONOSCOPY N/A 8/16/2023    Procedure: COLONOSCOPY;  Surgeon: Jacques Bautista MD;  Location: [facility] ENDO;  Service: Endoscopy;  Laterality: N/A;    COLONOSCOPY W/ POLYPECTOMY  08/16/2023    HYSTEROSCOPY WITH DILATION AND CURETTAGE OF UTERUS N/A 12/27/2023    Procedure: HYSTEROSCOPY, WITH DILATION AND CURETTAGE OF UTERUS;  Surgeon: Zaida Land MD;  Location: [facility] OR;  Service: OB/GYN;  Laterality: N/A;    TUBAL LIGATION       Anesthesia Assessment: Preoperative EQUATION    Planned Procedure: Procedure(s) (LRB):  RIGHT , ROTATOR CUFF, ARTHROSCOPIC (Right)  RIGHT THUMB , TRIGGER FINGER, RELEASE (Right)  Requested Anesthesia Type:General/Regional  Surgeon: Lida Reyes MD  Service: Orthopedics  Known or anticipated Date of Surgery:4/28/2025    Surgeon notes: reviewed    Electronic QUestionnaire Assessment completed via nurse interview with patient.      Triage considerations:     The patient has no apparent active cardiac condition (No unstable coronary Syndrome such as severe unstable angina or recent [<1 month] myocardial infarction, decompensated CHF, severe valvular   disease or significant arrhythmia)    Previous anesthesia records:LMA General, MAC, and No problems    Last PCP note: 6-12 months ago   Subspecialty notes: Ortho    Other important co-morbidities: Obesity   No EKG/Echo/Stress test available on chart.     Tests already available:  Results have been reviewed.             Instructions given. (See in Nurse's note) Pre op medication instructions sent via portal message.    Optimization:  Anesthesia Preop Clinic Assessment  Indicated    Medical Opinion Indicated: no       Sub-specialist consult indicated: no      Plan:   Pt has appt with Bria Mahoney in pre op clinic 4/23/25.    Navigation:  Results will be tracked by Preop Clinic.     Ht: 5'4"  Wt: 80 kg (176 lb)  BMI: 31.4    Anesthesia Plan  Type " of Anesthesia, risks & benefits discussed:    Anesthesia Type: Gen ETT, Regional  Intra-op Monitoring Plan: Standard ASA Monitors  Post Op Pain Control Plan: multimodal analgesia, IV/PO Opioids PRN and peripheral nerve block  Induction:  IV  Informed Consent: Informed consent signed with the Patient and all parties understand the risks and agree with anesthesia plan.  All questions answered.   ASA Score: 2  Day of Surgery Review of History & Physical: H&P Update referred to the surgeon/provider.  Anesthesia Plan Notes: Preoperative evaluation completed by chart review, updated DOS after patient evaluation and physical examination.    Discussed: general anesthesia with native airway vs supraglottic airway vs ett, regional block for postoperative pain control. Discussed risks associated with regional block to include but not be limited to: bleeding, infection, or even nerve injury. Discussed risks associated with general anesthesia to include but not be limited to: dental or lip injury, post operative nausea and vomiting, cardiac arrest, stroke, or even death.     Patient voiced understanding and elects to proceed.       Ready For Surgery From Anesthesia Perspective.     .         [1]   Social History  Socioeconomic History    Marital status:    Tobacco Use    Smoking status: Never    Smokeless tobacco: Never   Substance and Sexual Activity    Alcohol use: Yes     Comment: socially - monthly    Drug use: No    Sexual activity: Yes     Partners: Male     Birth control/protection: See Surgical Hx, Condom     Comment: occasionaly uses condoms

## 2025-04-28 ENCOUNTER — ANESTHESIA (OUTPATIENT)
Dept: SURGERY | Facility: HOSPITAL | Age: 50
End: 2025-04-28
Payer: COMMERCIAL

## 2025-04-28 ENCOUNTER — HOSPITAL ENCOUNTER (OUTPATIENT)
Facility: HOSPITAL | Age: 50
Discharge: HOME | End: 2025-04-28
Attending: ORTHOPAEDIC SURGERY | Admitting: ORTHOPAEDIC SURGERY
Payer: COMMERCIAL

## 2025-04-28 VITALS
TEMPERATURE: 98 F | WEIGHT: 181 LBS | DIASTOLIC BLOOD PRESSURE: 70 MMHG | OXYGEN SATURATION: 98 % | SYSTOLIC BLOOD PRESSURE: 131 MMHG | HEIGHT: 64 IN | RESPIRATION RATE: 17 BRPM | BODY MASS INDEX: 30.9 KG/M2 | HEART RATE: 59 BPM

## 2025-04-28 DIAGNOSIS — M75.100 ROTATOR CUFF TEAR: Primary | ICD-10-CM

## 2025-04-28 DIAGNOSIS — M25.511 RIGHT SHOULDER PAIN, UNSPECIFIED CHRONICITY: ICD-10-CM

## 2025-04-28 DIAGNOSIS — M65.30 TRIGGER FINGER OF RIGHT HAND, UNSPECIFIED FINGER: ICD-10-CM

## 2025-04-28 DIAGNOSIS — M79.641 RIGHT HAND PAIN: ICD-10-CM

## 2025-04-28 PROCEDURE — 36000710: Performed by: ORTHOPAEDIC SURGERY

## 2025-04-28 PROCEDURE — 63600175 PHARM REV CODE 636 W HCPCS: Performed by: ORTHOPAEDIC SURGERY

## 2025-04-28 PROCEDURE — 63600175 PHARM REV CODE 636 W HCPCS: Performed by: STUDENT IN AN ORGANIZED HEALTH CARE EDUCATION/TRAINING PROGRAM

## 2025-04-28 PROCEDURE — D9220A PRA ANESTHESIA: Mod: ANES,,, | Performed by: STUDENT IN AN ORGANIZED HEALTH CARE EDUCATION/TRAINING PROGRAM

## 2025-04-28 PROCEDURE — 25000003 PHARM REV CODE 250: Performed by: ORTHOPAEDIC SURGERY

## 2025-04-28 PROCEDURE — 25000003 PHARM REV CODE 250: Performed by: PHYSICIAN ASSISTANT

## 2025-04-28 PROCEDURE — 99900035 HC TECH TIME PER 15 MIN (STAT)

## 2025-04-28 PROCEDURE — 37000009 HC ANESTHESIA EA ADD 15 MINS: Performed by: ORTHOPAEDIC SURGERY

## 2025-04-28 PROCEDURE — 64416 NJX AA&/STRD BRCH PL NFS IMG: CPT | Performed by: STUDENT IN AN ORGANIZED HEALTH CARE EDUCATION/TRAINING PROGRAM

## 2025-04-28 PROCEDURE — 71000015 HC POSTOP RECOV 1ST HR: Performed by: ORTHOPAEDIC SURGERY

## 2025-04-28 PROCEDURE — 71000039 HC RECOVERY, EACH ADD'L HOUR: Performed by: ORTHOPAEDIC SURGERY

## 2025-04-28 PROCEDURE — 88304 TISSUE EXAM BY PATHOLOGIST: CPT | Mod: TC | Performed by: ORTHOPAEDIC SURGERY

## 2025-04-28 PROCEDURE — 27201423 OPTIME MED/SURG SUP & DEVICES STERILE SUPPLY: Performed by: ORTHOPAEDIC SURGERY

## 2025-04-28 PROCEDURE — 26160 REMOVE TENDON SHEATH LESION: CPT | Mod: XS,F5,, | Performed by: ORTHOPAEDIC SURGERY

## 2025-04-28 PROCEDURE — 63600175 PHARM REV CODE 636 W HCPCS: Performed by: PHYSICIAN ASSISTANT

## 2025-04-28 PROCEDURE — 36000711: Performed by: ORTHOPAEDIC SURGERY

## 2025-04-28 PROCEDURE — 71000016 HC POSTOP RECOV ADDL HR: Performed by: ORTHOPAEDIC SURGERY

## 2025-04-28 PROCEDURE — 71000033 HC RECOVERY, INTIAL HOUR: Performed by: ORTHOPAEDIC SURGERY

## 2025-04-28 PROCEDURE — 63600175 PHARM REV CODE 636 W HCPCS: Performed by: NURSE ANESTHETIST, CERTIFIED REGISTERED

## 2025-04-28 PROCEDURE — D9220A PRA ANESTHESIA: Mod: CRNA,,, | Performed by: NURSE ANESTHETIST, CERTIFIED REGISTERED

## 2025-04-28 PROCEDURE — 94761 N-INVAS EAR/PLS OXIMETRY MLT: CPT

## 2025-04-28 PROCEDURE — 37000008 HC ANESTHESIA 1ST 15 MINUTES: Performed by: ORTHOPAEDIC SURGERY

## 2025-04-28 PROCEDURE — 25000003 PHARM REV CODE 250

## 2025-04-28 PROCEDURE — 25000003 PHARM REV CODE 250: Performed by: NURSE ANESTHETIST, CERTIFIED REGISTERED

## 2025-04-28 PROCEDURE — 29821 SHO ARTHRS SRG COMPL SYNVCT: CPT | Mod: RT,,, | Performed by: ORTHOPAEDIC SURGERY

## 2025-04-28 RX ORDER — FENTANYL CITRATE 50 UG/ML
INJECTION, SOLUTION INTRAMUSCULAR; INTRAVENOUS
Status: DISCONTINUED | OUTPATIENT
Start: 2025-04-28 | End: 2025-04-28

## 2025-04-28 RX ORDER — EPINEPHRINE 1 MG/ML
INJECTION INTRAMUSCULAR; INTRAVENOUS; SUBCUTANEOUS
Status: DISCONTINUED | OUTPATIENT
Start: 2025-04-28 | End: 2025-04-28 | Stop reason: HOSPADM

## 2025-04-28 RX ORDER — LIDOCAINE HYDROCHLORIDE 20 MG/ML
INJECTION INTRAVENOUS
Status: DISCONTINUED | OUTPATIENT
Start: 2025-04-28 | End: 2025-04-28

## 2025-04-28 RX ORDER — ROPIVACAINE HYDROCHLORIDE 2 MG/ML
INJECTION, SOLUTION EPIDURAL; INFILTRATION; PERINEURAL CONTINUOUS
Status: DISCONTINUED | OUTPATIENT
Start: 2025-04-28 | End: 2025-04-28 | Stop reason: HOSPADM

## 2025-04-28 RX ORDER — MULTIVITAMIN
1 TABLET ORAL DAILY
COMMUNITY

## 2025-04-28 RX ORDER — ACETAMINOPHEN 500 MG
1000 TABLET ORAL
Status: COMPLETED | OUTPATIENT
Start: 2025-04-28 | End: 2025-04-28

## 2025-04-28 RX ORDER — HALOPERIDOL LACTATE 5 MG/ML
0.5 INJECTION, SOLUTION INTRAMUSCULAR EVERY 10 MIN PRN
Status: COMPLETED | OUTPATIENT
Start: 2025-04-28 | End: 2025-04-28

## 2025-04-28 RX ORDER — CEFAZOLIN 2 G/1
2 INJECTION, POWDER, FOR SOLUTION INTRAMUSCULAR; INTRAVENOUS
Status: DISCONTINUED | OUTPATIENT
Start: 2025-04-28 | End: 2025-04-28 | Stop reason: HOSPADM

## 2025-04-28 RX ORDER — MUPIROCIN 20 MG/G
OINTMENT TOPICAL
Status: DISCONTINUED | OUTPATIENT
Start: 2025-04-28 | End: 2025-04-28 | Stop reason: HOSPADM

## 2025-04-28 RX ORDER — FENTANYL CITRATE 50 UG/ML
100 INJECTION, SOLUTION INTRAMUSCULAR; INTRAVENOUS
Status: DISCONTINUED | OUTPATIENT
Start: 2025-04-28 | End: 2025-04-28 | Stop reason: HOSPADM

## 2025-04-28 RX ORDER — ONDANSETRON HYDROCHLORIDE 2 MG/ML
INJECTION, SOLUTION INTRAVENOUS
Status: DISCONTINUED | OUTPATIENT
Start: 2025-04-28 | End: 2025-04-28

## 2025-04-28 RX ORDER — PHENYLEPHRINE HYDROCHLORIDE 10 MG/ML
INJECTION INTRAVENOUS
Status: DISCONTINUED | OUTPATIENT
Start: 2025-04-28 | End: 2025-04-28

## 2025-04-28 RX ORDER — DEXAMETHASONE SODIUM PHOSPHATE 4 MG/ML
INJECTION, SOLUTION INTRA-ARTICULAR; INTRALESIONAL; INTRAMUSCULAR; INTRAVENOUS; SOFT TISSUE
Status: DISCONTINUED | OUTPATIENT
Start: 2025-04-28 | End: 2025-04-28

## 2025-04-28 RX ORDER — FENTANYL CITRATE 50 UG/ML
25 INJECTION, SOLUTION INTRAMUSCULAR; INTRAVENOUS EVERY 5 MIN PRN
Status: DISCONTINUED | OUTPATIENT
Start: 2025-04-28 | End: 2025-04-28 | Stop reason: HOSPADM

## 2025-04-28 RX ORDER — SODIUM CHLORIDE 9 MG/ML
INJECTION, SOLUTION INTRAVENOUS CONTINUOUS
Status: DISCONTINUED | OUTPATIENT
Start: 2025-04-28 | End: 2025-04-28 | Stop reason: HOSPADM

## 2025-04-28 RX ORDER — PROPOFOL 10 MG/ML
VIAL (ML) INTRAVENOUS
Status: DISCONTINUED | OUTPATIENT
Start: 2025-04-28 | End: 2025-04-28

## 2025-04-28 RX ORDER — OXYCODONE HYDROCHLORIDE 5 MG/1
5 TABLET ORAL
Status: DISCONTINUED | OUTPATIENT
Start: 2025-04-28 | End: 2025-04-28 | Stop reason: HOSPADM

## 2025-04-28 RX ORDER — KETAMINE HCL IN 0.9 % NACL 50 MG/5 ML
SYRINGE (ML) INTRAVENOUS
Status: DISCONTINUED | OUTPATIENT
Start: 2025-04-28 | End: 2025-04-28

## 2025-04-28 RX ORDER — CELECOXIB 200 MG/1
400 CAPSULE ORAL
Status: COMPLETED | OUTPATIENT
Start: 2025-04-28 | End: 2025-04-28

## 2025-04-28 RX ORDER — BACITRACIN ZINC 500 UNIT/G
OINTMENT (GRAM) TOPICAL
Status: DISCONTINUED | OUTPATIENT
Start: 2025-04-28 | End: 2025-04-28 | Stop reason: HOSPADM

## 2025-04-28 RX ORDER — NEOSTIGMINE METHYLSULFATE 0.5 MG/ML
INJECTION INTRAVENOUS
Status: DISCONTINUED | OUTPATIENT
Start: 2025-04-28 | End: 2025-04-28

## 2025-04-28 RX ORDER — ROCURONIUM BROMIDE 10 MG/ML
INJECTION, SOLUTION INTRAVENOUS
Status: DISCONTINUED | OUTPATIENT
Start: 2025-04-28 | End: 2025-04-28

## 2025-04-28 RX ORDER — ROPIVACAINE HYDROCHLORIDE 5 MG/ML
INJECTION, SOLUTION EPIDURAL; INFILTRATION; PERINEURAL
Status: COMPLETED | OUTPATIENT
Start: 2025-04-28 | End: 2025-04-28

## 2025-04-28 RX ORDER — MIDAZOLAM HYDROCHLORIDE 1 MG/ML
1 INJECTION, SOLUTION INTRAMUSCULAR; INTRAVENOUS
Status: DISCONTINUED | OUTPATIENT
Start: 2025-04-28 | End: 2025-04-28 | Stop reason: HOSPADM

## 2025-04-28 RX ADMIN — ROCURONIUM BROMIDE 50 MG: 10 INJECTION, SOLUTION INTRAVENOUS at 10:04

## 2025-04-28 RX ADMIN — Medication 20 MG: at 10:04

## 2025-04-28 RX ADMIN — ROPIVACAINE HYDROCHLORIDE 7.5 ML: 5 INJECTION EPIDURAL; INFILTRATION; PERINEURAL at 09:04

## 2025-04-28 RX ADMIN — ACETAMINOPHEN 1000 MG: 500 TABLET ORAL at 08:04

## 2025-04-28 RX ADMIN — HALOPERIDOL LACTATE 0.5 MG: 5 INJECTION, SOLUTION INTRAMUSCULAR at 01:04

## 2025-04-28 RX ADMIN — PHENYLEPHRINE HYDROCHLORIDE 100 MCG: 10 INJECTION INTRAVENOUS at 11:04

## 2025-04-28 RX ADMIN — MIDAZOLAM 2 MG: 1 INJECTION INTRAMUSCULAR; INTRAVENOUS at 09:04

## 2025-04-28 RX ADMIN — SODIUM CHLORIDE: 9 INJECTION, SOLUTION INTRAVENOUS at 08:04

## 2025-04-28 RX ADMIN — FENTANYL CITRATE 100 MCG: 50 INJECTION, SOLUTION INTRAMUSCULAR; INTRAVENOUS at 10:04

## 2025-04-28 RX ADMIN — Medication: at 12:04

## 2025-04-28 RX ADMIN — HALOPERIDOL LACTATE 0.5 MG: 5 INJECTION, SOLUTION INTRAMUSCULAR at 02:04

## 2025-04-28 RX ADMIN — DEXTROSE MONOHYDRATE 2 G: 50 INJECTION, SOLUTION INTRAVENOUS at 10:04

## 2025-04-28 RX ADMIN — PROPOFOL 200 MG: 10 INJECTION, EMULSION INTRAVENOUS at 10:04

## 2025-04-28 RX ADMIN — DEXAMETHASONE SODIUM PHOSPHATE 8 MG: 4 INJECTION, SOLUTION INTRAMUSCULAR; INTRAVENOUS at 10:04

## 2025-04-28 RX ADMIN — CELECOXIB 400 MG: 200 CAPSULE ORAL at 08:04

## 2025-04-28 RX ADMIN — LIDOCAINE HYDROCHLORIDE 60 MG: 20 INJECTION INTRAVENOUS at 10:04

## 2025-04-28 RX ADMIN — SODIUM CHLORIDE, SODIUM GLUCONATE, SODIUM ACETATE, POTASSIUM CHLORIDE, MAGNESIUM CHLORIDE, SODIUM PHOSPHATE, DIBASIC, AND POTASSIUM PHOSPHATE: .53; .5; .37; .037; .03; .012; .00082 INJECTION, SOLUTION INTRAVENOUS at 12:04

## 2025-04-28 RX ADMIN — FENTANYL CITRATE 50 MCG: 50 INJECTION INTRAMUSCULAR; INTRAVENOUS at 09:04

## 2025-04-28 RX ADMIN — ONDANSETRON 4 MG: 2 INJECTION INTRAMUSCULAR; INTRAVENOUS at 10:04

## 2025-04-28 RX ADMIN — GLYCOPYRROLATE 0.4 MG: 0.2 INJECTION, SOLUTION INTRAMUSCULAR; INTRAVENOUS at 11:04

## 2025-04-28 RX ADMIN — NEOSTIGMINE METHYLSULFATE 4 MG: 0.5 INJECTION INTRAVENOUS at 11:04

## 2025-04-28 RX ADMIN — SUGAMMADEX 200 MG: 100 INJECTION, SOLUTION INTRAVENOUS at 11:04

## 2025-04-28 RX ADMIN — MUPIROCIN: 20 OINTMENT TOPICAL at 08:04

## 2025-04-28 NOTE — TRANSFER OF CARE
"Anesthesia Transfer of Care Note    Patient: Sara Alcaraz    Procedure(s) Performed: Procedure(s) (LRB):  RIGHT THUMB , TRIGGER FINGER, RELEASE (Right)  EXCISION, MASS, FINGER (Right)  ARTHROSCOPY, SHOULDER, W/ SUBACROMIAL DECOMPRESSION (Right)  ARTHROSCOPY, SHOULDER, WITH COMPLETE SYNOVECTOMY (Right)  ACROMIOPLASTY (Right)  ARTHROSCOPY, SHOULDER, W/ DEBRIDEMENT, ROTATOR CUFF (Right)    Patient location: PACU    Anesthesia Type: general and regional    Transport from OR: Transported from OR on room air with adequate spontaneous ventilation    Post pain: adequate analgesia    Post assessment: no apparent anesthetic complications    Post vital signs: stable    Level of consciousness: awake    Nausea/Vomiting: no nausea/vomiting    Complications: none    Transfer of care protocol was followed      Last vitals: Visit Vitals  /67 (BP Location: Left arm, Patient Position: Lying)   Pulse 75   Temp 36.8 °C (98.2 °F) (Oral)   Resp 17   Ht 5' 4" (1.626 m)   Wt 82.1 kg (181 lb)   LMP 10/11/2021 (Within Days)   SpO2 100%   Breastfeeding No   BMI 31.07 kg/m²     "

## 2025-04-28 NOTE — ANESTHESIA POSTPROCEDURE EVALUATION
Anesthesia Post Evaluation    Patient: Sara Alcaraz    Procedure(s) Performed: Procedure(s) (LRB):  RIGHT THUMB , TRIGGER FINGER, RELEASE (Right)  EXCISION, MASS, FINGER (Right)  ARTHROSCOPY, SHOULDER, W/ SUBACROMIAL DECOMPRESSION (Right)  ARTHROSCOPY, SHOULDER, WITH COMPLETE SYNOVECTOMY (Right)  ACROMIOPLASTY (Right)  ARTHROSCOPY, SHOULDER, W/ DEBRIDEMENT, ROTATOR CUFF (Right)    Final Anesthesia Type: general      Patient location during evaluation: PACU  Patient participation: Yes- Able to Participate  Level of consciousness: awake and alert and oriented  Post-procedure vital signs: reviewed and stable  Pain management: adequate  Airway patency: patent    PONV status at discharge: No PONV  Anesthetic complications: no      Cardiovascular status: blood pressure returned to baseline  Respiratory status: unassisted, spontaneous ventilation and room air  Hydration status: euvolemic  Follow-up not needed.              Vitals Value Taken Time   /70 04/28/25 14:31   Temp 36.4 °C (97.6 °F) 04/28/25 14:30   Pulse 63 04/28/25 14:34   Resp 22 04/28/25 14:33   SpO2 95 % 04/28/25 14:34   Vitals shown include unfiled device data.      Event Time   Out of Recovery 13:30:00         Pain/Rita Score: Pain Rating Prior to Med Admin: 0 (4/28/2025 12:19 PM)  Rita Score: 10 (4/28/2025  2:03 PM)

## 2025-04-28 NOTE — PLAN OF CARE
Care plan reviewed w/ pt and spouse at bedside. AVSS on RA. R shoulder and wrist dressings CDI, sling in place. CADD pump in place. No c/o pain. All Rx delivered to bedside. Pt states she is ready for discharge.

## 2025-04-28 NOTE — ANESTHESIA PROCEDURE NOTES
Peripheral Block    Patient location during procedure: pre-op   Block not for primary anesthetic.  Reason for block: at surgeon's request and post-op pain management   Post-op Pain Location: right shoulder   Start time: 4/28/2025 9:10 AM  Timeout: 4/28/2025 9:09 AM   End time: 4/28/2025 9:30 AM    Staffing  Authorizing Provider: Yu Blankenship MD  Performing Provider: Yu Blankenship MD    Staffing  Performed by: Yu Blankenship MD  Authorized by: Yu Blankenship MD    Preanesthetic Checklist  Completed: patient identified, IV checked, site marked, risks and benefits discussed, surgical consent, monitors and equipment checked, pre-op evaluation and timeout performed  Peripheral Block  Patient position: sitting  Prep: ChloraPrep and site prepped and draped  Patient monitoring: heart rate, cardiac monitor, continuous pulse ox, continuous capnometry and frequent blood pressure checks  Block type: interscalene  Laterality: right  Injection technique: continuous  Needle  Needle type: Tuohy   Needle gauge: 18 G  Needle length: 2 in  Needle localization: anatomical landmarks and ultrasound guidance  Catheter type: non-stimulating  Catheter size: 20 G  Test dose: lidocaine 1.5% with Epi 1-to-200,000 and negative   -ultrasound image captured on disc.  Assessment  Injection assessment: negative aspiration, negative parasthesia and local visualized surrounding nerve  Paresthesia pain: none  Heart rate change: no  Slow fractionated injection: yes  Pain Tolerance: comfortable throughout block and no complaints  Medications:    Medications: ropivacaine (NAROPIN) injection 0.5% - Perineural   7.5 mL - 4/28/2025 9:25:00 AM    Additional Notes  VSS.  [facility] RN monitoring vitals throughout procedure.  Patient tolerated procedure well.

## 2025-04-28 NOTE — ANESTHESIA PROCEDURE NOTES
Intubation    Date/Time: 4/28/2025 10:18 AM    Performed by: Nirmala Paredes CRNA  Authorized by: Yu Blankenship MD    Intubation:     Induction:  Intravenous    Intubated:  Postinduction    Mask Ventilation:  Easy mask    Attempts:  1    Attempted By:  CRNA    Method of Intubation:  Video laryngoscopy    Blade:  Jeong 3    Laryngeal View Grade: Grade I - full view of cords      Difficult Airway Encountered?: No      Complications:  None    Airway Device:  Oral endotracheal tube    Airway Device Size:  7.5    Style/Cuff Inflation:  Cuffed (inflated to minimal occlusive pressure)    Tube secured:  22    Secured at:  The lips    Placement Verified By:  Capnometry    Complicating Factors:  Short neck and none    Findings Post-Intubation:  BS equal bilateral and atraumatic/condition of teeth unchanged

## 2025-04-28 NOTE — BRIEF OP NOTE
[place] - Surgery ([facility])  Brief Operative Note    Surgery Date: 4/28/2025     Surgeons and Role:     * Lida Reyes MD - Primary    Assisting Surgeon: None    Pre-op Diagnosis:  Right shoulder pain, unspecified chronicity [M25.511]  Trigger finger of right hand, unspecified finger [M65.30]  Right hand pain [M79.641]    Post-op Diagnosis:  Post-Op Diagnosis Codes:     * Right shoulder pain, unspecified chronicity [M25.511]     * Trigger finger of right hand, unspecified finger [M65.30]     * Right hand pain [M79.641]    Procedure(s) (LRB):  RIGHT THUMB , TRIGGER FINGER, RELEASE (Right)  EXCISION, MASS, FINGER (Right)  ARTHROSCOPY, SHOULDER, W/ SUBACROMIAL DECOMPRESSION (Right)  ARTHROSCOPY, SHOULDER, WITH COMPLETE SYNOVECTOMY (Right)  ACROMIOPLASTY (Right)  ARTHROSCOPY, SHOULDER, W/ DEBRIDEMENT, ROTATOR CUFF (Right)    Anesthesia: N/A    Operative Findings: See full op note    Estimated Blood Loss: < 10 mL         Specimens:   Specimen (24h ago, onward)       Start     Ordered    04/28/25 1053  Specimen to Pathology  RELEASE UPON ORDERING        References:    Click here for ordering Quick Tip   Question:  Release to patient  Answer:  Immediate    04/28/25 1053                    ID Type Source Tests Collected by Time Destination   1 : right thumb mass Tissue Hand, Right SPECIMEN TO PATHOLOGY Lida Reyes MD 4/28/2025 1052            Discharge Note    OUTCOME: Patient tolerated treatment/procedure well without complication and is now ready for discharge.    DISPOSITION: Home or Self Care    FINAL DIAGNOSIS:  Right shoulder pain    FOLLOWUP: In clinic    DISCHARGE INSTRUCTIONS:    Discharge Procedure Orders   Notify your health care provider if you experience any of the following:  temperature >100.4     Notify your health care provider if you experience any of the following:  persistent nausea and vomiting or diarrhea     Notify your health care provider if you experience any of the following:   severe uncontrolled pain     Notify your health care provider if you experience any of the following:  redness, tenderness, or signs of infection (pain, swelling, redness, odor or green/yellow discharge around incision site)     Notify your health care provider if you experience any of the following:  difficulty breathing or increased cough     Notify your health care provider if you experience any of the following:  severe persistent headache     Notify your health care provider if you experience any of the following:  worsening rash     Notify your health care provider if you experience any of the following:  persistent dizziness, light-headedness, or visual disturbances     Notify your health care provider if you experience any of the following:  increased confusion or weakness     Leave dressing on - Keep it clean, dry, and intact until clinic visit     Weight bearing restrictions (specify):   Order Comments: SAVTIA GALEAS

## 2025-04-29 ENCOUNTER — PATIENT MESSAGE (OUTPATIENT)
Dept: ORTHOPEDICS | Facility: CLINIC | Age: 50
End: 2025-04-29
Payer: COMMERCIAL

## 2025-04-29 LAB
ESTROGEN SERPL-MCNC: NORMAL PG/ML
INSULIN SERPL-ACNC: NORMAL U[IU]/ML
LAB AP CLINICAL INFORMATION: NORMAL
LAB AP GROSS DESCRIPTION: NORMAL
LAB AP PERFORMING LOCATION(S): NORMAL
LAB AP REPORT FOOTNOTES: NORMAL
T3RU NFR SERPL: NORMAL %

## 2025-04-29 NOTE — ANESTHESIA POST-OP PAIN MANAGEMENT
"Acute Pain Service Progress Note    4/29/2025 1155    Patient contacted regarding CADD infusion follow up. Reports that pain has been well controlled with the infusion pump. Denies signs of local anesthetic toxicity. PNC dressing remains clean, dry, and intact. All questions answered. Reminded patient that the infusion should be discontinued and PNC removed whenever the "infusion complete" alert is seen on the display screen or by POD 5 (5/3/25) at 12pm, whichever comes first. Encouraged patient to call the CADD 24/7 support line at [phone] or the [facility] An[facility] line at [phone] for any CADD related questions/issues. Verbalizes understanding.              "

## 2025-04-30 NOTE — OP NOTE
[facility] Surgery ([facility])  Surgery Department  Operative Note    SUMMARY     Date of Procedure: 4/28/2025     Procedure: Procedure(s) (LRB):    ARTHROSCOPY, SHOULDER, W/ SUBACROMIAL DECOMPRESSION (Right)  ARTHROSCOPY, SHOULDER, WITH COMPLETE SYNOVECTOMY (Right)  ACROMIOPLASTY (Right)  ARTHROSCOPY, SHOULDER, W/ DEBRIDEMENT, ROTATOR CUFF (Right)   Right trigger thumb release  Excision cyst right thumb A1 pulley    Surgeons and Role:     * Lida Reyes MD - Primary    Assisting Surgeon: Curtis    Pre-Operative Diagnosis: Right shoulder pain, unspecified chronicity [M25.511]  Trigger finger of right hand, unspecified finger [M65.30]  Right hand pain [M79.641]    Post-Operative Diagnosis: Post-Op Diagnosis Codes:     * Right shoulder pain, unspecified chronicity [M25.511]     * Trigger finger of right hand, unspecified finger [M65.30]     * Right hand pain [M79.641]    Anesthesia: N/A    Technical Procedures Used: surgery    Description of the Findings of the Procedure:  Indication for procedure  is a 50-year-old female who has failed conservative treatment for both right shoulder pain as well as right trigger thumb after much discussion with the patient MRI were reviewed she had impingement we discussed treatment options patient elected for surgery risks and benefits were explained to the patient in clinic consents were signed in clinic    Procedure in detail the correct site was marked with the patient's participation the holding area patient underwent regional anesthesia was brought to the operating placed in supine position underwent general anesthesia she was placed in a well-padded beach chair position with her right upper extremity prepped and draped free exam under anesthesia showed she had limited external rotation good forward flexion as well as internal rotation our attention was 1st turned to the thumb a sterile tourniquet was placed on the forearm a transverse incision was made over  the A1 pulley the arm was exsanguinated an Esmarch tourniquet was insufflated 250 mmHg incision was made careful dissection down to the A1 pulley all the while protecting the digital branches these were gently retracted out the way the retinaculum pulley was identified it was very thick in nature with cyst this was sharply incised a portion of it was excised including the cyst which was passed off the back table for specimen once it was completely released the tendon was retracted out the tendon sheath thumb was placed through range motion of note the thumb was very stiff so manipulation was performed intraoperatively and after we can get better range of motion the areas irrigated copious amounts normal saline nylon closed the skin this was covered placed in a and pervious stocking at and the hand was then placed in a true mono beach chair position.  The anatomical landmarks were marked out over her shoulder if it was re-prepped 18 gauge needle was introduced into the posterior aspect of the shoulder and the joint was insufflated with 30 cc of normal saline incision was made arthroscope was introduced immediately we could see a significant amount of synovitis throughout the shoulder specifically glenohumeral joint anterior portal was then created shaver was introduced a synovectomy was conducted of note patient did have some cartilage changes on the glenoid the photos were taken of this the humerus appeared in good condition we did see some a mild amount of undersurface wear of the rotator cuff however it did appear intact from the glenohumeral joint subscap was intact as well as the biceps was evaluated and show that the tendon was in good position there was not significant amount of inflammation and it was not torn and therefore no biceps tenodesis was conducted because it was in good position and good appearance after this was completed we looked at all the gutters and the glenohumeral joint to confirm that there  was no loose bodies the arthroscope was removed it was then placed in the subacromial space a lateral portal was created immediately we could see a significant amount of bursitis shaver was introduced and a subacromial decompression was conducted at that point we could see a large acromial bone spur and a bur was utilized to remove that bone spur at that point probe was introduced to assess the rotator cuff there was some fraying but there was no laila tear and therefore no rotator cuff tear was performed it was just debrided with a shaver instruments were then removed Prolene closed the skin sterile dressing was applied patient was placed in a well-padded sling tolerated suture was brought to cover room in stable condition     Postop plans patient keep the dressing clean dry intact 2 weeks will take the sutures out of the triggers thumb therapy to be initiated on the shoulder and the thumb at that time    Significant Surgical Tasks Conducted by the Assistant(s), if Applicable: retraction/traction    Complications: No    Estimated Blood Loss (EBL): * No values recorded between 4/28/2025  9:59 AM and 4/28/2025 12:07 PM *           Implants: * No implants in log *    Specimens:   Specimen (24h ago, onward)      None                    Condition: Good    Disposition: PACU - hemodynamically stable.    Attestation: I performed the procedure.    Discharge Note    SUMMARY     Admit Date: 4/28/2025    Discharge Date and Time: 4/28/2025  2:51 PM    Hospital Course (synopsis of major diagnoses, care, treatment, and services provided during the course of the hospital stay): surgery     Final Diagnosis: Post-Op Diagnosis Codes:     * Right shoulder pain, unspecified chronicity [M25.511]     * Trigger finger of right hand, unspecified finger [M65.30]     * Right hand pain [M79.641]    Disposition: Home or Self Care    Follow Up/Patient Instructions:     Medications:  Reconciled Home Medications:      Medication List        START  taking these medications      acetaminophen 500 MG tablet  Commonly known as: TYLENOL  Take 2 tablets (1,000 mg total) by mouth every 8 (eight) hours.     celecoxib 100 MG capsule  Commonly known as: CeleBREX  Take 1 capsule (100 mg total) by mouth 2 (two) times daily.     gabapentin 100 MG capsule  Commonly known as: NEURONTIN  Take 1 capsule (100 mg total) by mouth 3 (three) times daily. for 15 days     methocarbamoL 500 MG Tab  Commonly known as: Robaxin  Take 2 tablets (1,000 mg total) by mouth 3 (three) times daily. for 15 days     oxyCODONE 5 MG immediate release tablet  Commonly known as: ROXICODONE  Take 1 tablet (5 mg total) by mouth every 6 (six) hours as needed for Pain.            CONTINUE taking these medications      albuterol 90 mcg/actuation inhaler  Commonly known as: PROVENTIL/VENTOLIN HFA  2 puffs every 4 (four) hours as needed.     cetirizine 10 MG tablet  Commonly known as: ZYRTEC  Take 10 mg by mouth once daily.     cyclobenzaprine 5 MG tablet  Commonly known as: FLEXERIL  TAKE 1 TO 2 TABLETS BY MOUTH THREE TIMES DAILY AS NEEDED FOR PAIN     diclofenac sodium 1 % Gel  Commonly known as: VOLTAREN  Apply 2 g topically 4 (four) times daily.     EPINEPHrine 0.3 mg/0.3 mL Atin  Commonly known as: AUVI-Q  Inject 0.3 mLs (0.3 mg total) into the muscle once.     fluticasone propionate 50 mcg/actuation nasal spray  Commonly known as: FLONASE  1 spray (50 mcg total) by Each Nostril route 2 (two) times daily as needed for Rhinitis.     ibuprofen 600 MG tablet  Commonly known as: ADVIL,MOTRIN  Take 1 tablet (600 mg total) by mouth every 6 (six) hours as needed for Pain.     ketoconazole 2 % cream  Commonly known as: NIZORAL  SMARTSI Application Topical 1 to 2 Times Daily     meloxicam 7.5 MG tablet  Commonly known as: MOBIC  TAKE 1 TABLET BY MOUTH 2 TIMES DAILY, 1 TABLET 1 HOUR PRIOR TO PROCEDURE, THEN TWICE DAILY FOR ONE WEEK FOR 7 DAYS     naproxen 500 MG tablet  Commonly known as: NAPROSYN  Take 500  mg by mouth 2 (two) times daily.     nystatin powder  Commonly known as: MYCOSTATIN  SMARTSIG:Packet(s) Topical Twice Daily     triamcinolone acetonide 0.1% 0.1 % cream  Commonly known as: KENALOG  SMARTSIG:sparingly Topical Twice Daily            ASK your doctor about these medications      ONE DAILY MULTIVITAMIN per tablet  Generic drug: multivitamin  Take 1 tablet by mouth once daily.            Discharge Procedure Orders   Notify your health care provider if you experience any of the following:  temperature >100.4     Notify your health care provider if you experience any of the following:  persistent nausea and vomiting or diarrhea     Notify your health care provider if you experience any of the following:  severe uncontrolled pain     Notify your health care provider if you experience any of the following:  redness, tenderness, or signs of infection (pain, swelling, redness, odor or green/yellow discharge around incision site)     Notify your health care provider if you experience any of the following:  difficulty breathing or increased cough     Notify your health care provider if you experience any of the following:  severe persistent headache     Notify your health care provider if you experience any of the following:  worsening rash     Notify your health care provider if you experience any of the following:  persistent dizziness, light-headedness, or visual disturbances     Notify your health care provider if you experience any of the following:  increased confusion or weakness     Leave dressing on - Keep it clean, dry, and intact until clinic visit     Weight bearing restrictions (specify):   Order Comments: SAVITA GALEAS      Follow-up Information       Bassem Marcelino PA-C Follow up in 2 week(s).    Specialty: Orthopedic Surgery  Why: For suture removal, For wound re-check  Contact information:  [address]  [facility] [address]  [facility] [id number] [phone]                              none

## 2025-05-01 ENCOUNTER — TELEPHONE (OUTPATIENT)
Dept: ORTHOPEDICS | Facility: CLINIC | Age: 50
End: 2025-05-01
Payer: COMMERCIAL

## 2025-05-01 ENCOUNTER — PATIENT MESSAGE (OUTPATIENT)
Dept: ORTHOPEDICS | Facility: CLINIC | Age: 50
End: 2025-05-01
Payer: COMMERCIAL

## 2025-05-01 NOTE — TELEPHONE ENCOUNTER
Spoke to the patient and advised her to call the number to anesthesia and maybe they could help her out

## 2025-05-02 ENCOUNTER — OFFICE VISIT (OUTPATIENT)
Dept: ORTHOPEDICS | Facility: CLINIC | Age: 50
End: 2025-05-02
Payer: COMMERCIAL

## 2025-05-02 DIAGNOSIS — Z98.890 POST-OPERATIVE STATE: Primary | ICD-10-CM

## 2025-05-02 DIAGNOSIS — M65.30 TRIGGER FINGER OF RIGHT HAND, UNSPECIFIED FINGER: ICD-10-CM

## 2025-05-02 DIAGNOSIS — M75.41 SUBACROMIAL IMPINGEMENT OF RIGHT SHOULDER: ICD-10-CM

## 2025-05-02 NOTE — PROGRESS NOTES
The patient location is: [place], [place]  The chief complaint leading to consultation is: PO f/u    Visit type: audiovisual    Face to Face time with patient: 6 min  15 minutes of total time spent on the encounter, which includes face to face time and non-face to face time preparing to see the patient (eg, review of tests), Obtaining and/or reviewing separately obtained history, Documenting clinical information in the electronic or other health record, Independently interpreting results (not separately reported) and communicating results to the patient/family/caregiver, or Care coordination (not separately reported).         Each patient to whom he or she provides medical services by telemedicine is:  (1) informed of the relationship between the physician and patient and the respective role of any other health care provider with respect to management of the patient; and (2) notified that he or she may decline to receive medical services by telemedicine and may withdraw from such care at any time.    Notes:     Ms. Alcaraz is here today for a post-operative visit.  She is 5 days status post Right Rotator Cuff Debridement with R Thumb trigger finger release by Dr. Reyes on 4/28/25. She reports that she is in minimal pain.  She states that her pain pump fell out and she place the pain pump back in in it fell out again and she replaced it back in.  She states she called anesthesia who instructed her to do so.  She denies fever, chills, and sweats since the time of the surgery.     Physical exam:    Physical exam limited due to virtual visit.  Resting comfortably with sling and pillow in place.      Assessment: s/p Right Rotator Cuff Debridement with R Thumb trigger finger release      Plan:  Diagnoses and all orders for this visit:    Post-operative state    Trigger finger of right hand, unspecified finger    Subacromial impingement of right shoulder        PO instruction reviewed and provided to patient  Instructed  patient on range-of-motion exercises of the elbow and hand   Patient we will follow up in one-week for suture removal   Placed orders for therapy to begin in one-week   Patient is to remain in the sling.      Bassem Marcelino PA-C, [facility]  [facility] [place]    Disclaimer: This note has been generated using voice-recognition software. There may be typographical errors that have been missed during proof-reading.

## 2025-05-03 ENCOUNTER — PATIENT MESSAGE (OUTPATIENT)
Dept: ORTHOPEDICS | Facility: CLINIC | Age: 50
End: 2025-05-03
Payer: COMMERCIAL

## 2025-05-05 ENCOUNTER — OFFICE VISIT (OUTPATIENT)
Dept: ORTHOPEDICS | Facility: CLINIC | Age: 50
End: 2025-05-05
Payer: COMMERCIAL

## 2025-05-05 DIAGNOSIS — Z98.890 POST-OPERATIVE STATE: Primary | ICD-10-CM

## 2025-05-05 PROCEDURE — 99024 POSTOP FOLLOW-UP VISIT: CPT | Mod: S$GLB,,, | Performed by: SPECIALIST/TECHNOLOGIST

## 2025-05-05 NOTE — PROGRESS NOTES
Patient reports 7 days status post right shoulder arthroscopy and right trigger thumb release.  She reports severe itching over the right shoulder.  She states that she has been taking Zyrtec as well as Claritin to help with the itching, but she feels the dressing is very bothersome.  Dressing was removed over the right shoulder revealed a erythematous rash with incision sites inspected and no signs of infection.  Hydrocortisone cream was applied to the area providing relief.  Large Band-Aids were placed over the incision sites to keep them covered.  Patient was instructed to keep the incisions clean and dry.  Patient we will follow up in one-week for suture removal.

## 2025-05-11 ENCOUNTER — PATIENT MESSAGE (OUTPATIENT)
Dept: ORTHOPEDICS | Facility: CLINIC | Age: 50
End: 2025-05-11
Payer: COMMERCIAL

## 2025-05-13 ENCOUNTER — OFFICE VISIT (OUTPATIENT)
Dept: ORTHOPEDICS | Facility: CLINIC | Age: 50
End: 2025-05-13
Payer: COMMERCIAL

## 2025-05-13 DIAGNOSIS — Z98.890 S/P ARTHROSCOPY OF RIGHT SHOULDER: ICD-10-CM

## 2025-05-13 DIAGNOSIS — Z98.890 POST-OPERATIVE STATE: Primary | ICD-10-CM

## 2025-05-13 DIAGNOSIS — M65.30 TRIGGER FINGER, UNSPECIFIED FINGER, UNSPECIFIED LATERALITY: ICD-10-CM

## 2025-05-13 PROCEDURE — 1159F MED LIST DOCD IN RCRD: CPT | Mod: CPTII,S$GLB,, | Performed by: SPECIALIST/TECHNOLOGIST

## 2025-05-13 PROCEDURE — 99999 PR PBB SHADOW E&M-EST. PATIENT-LVL IV: CPT | Mod: PBBFAC,,, | Performed by: SPECIALIST/TECHNOLOGIST

## 2025-05-13 PROCEDURE — 99024 POSTOP FOLLOW-UP VISIT: CPT | Mod: S$GLB,,, | Performed by: SPECIALIST/TECHNOLOGIST

## 2025-05-13 NOTE — PROGRESS NOTES
"Ms. Alcaraz is here today for a post-operative visit.  She is 15 days status post right shoulder arthroscopy and right thumb trigger thumb release by Dr. Reyes on 04/28/2025. She reports that she is moderate.  She states that she takes her sling is off couple of times a day in his lifting gal of milk that cause soreness within the shoulder.  She also notes sensitivity over the incision site of the trigger thumb.  She states that she works at [employer] doing a lot of heavy lifting pushing and pulling. She denies fever, chills, and sweats since the time of the surgery.     Physical exam:    Vitals:    05/13/25 1037   PainSc:   7   PainLoc: Shoulder     Vital signs are stable, patient is afebrile.  Patient is well dressed and well groomed, no acute distress.  Alert and oriented to person, place, and time.  Post op dressing taken down.  Incision is clean, dry and intact.  There is no erythema or exudate.  There is no sign of any infection. She is NVI. Sutures removed without difficulty.  Able to make a composite fist.  Hypersensitivity noted over the incision site of the thumb.    Assessment:   s/p right shoulder arthroscopy and right trigger thumb release        Plan:  Sara Escamilla" was seen today for post-op evaluation, pain, post-op evaluation and pain.    Diagnoses and all orders for this visit:    S/P arthroscopy of right shoulder  -     Ambulatory Referral/Consult to Physical Therapy; Future    Post-operative state  -     Ambulatory Referral/Consult to Occupational Therapy; Future    Trigger finger, unspecified finger, unspecified laterality  -     Ambulatory Referral/Consult to Occupational Therapy; Future        PO instruction reviewed and provided to patient  Order therapy for the right shoulder and right trigger thumb   Educated patient on scar massage  Advised patient we will begin weaning from the sling   Patient we will follow up in 4 weeks without an x-ray.  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 Although a sedentary job is defined as one which involves sitting, a certain amount of walking and standing is often necessary in carrying out job duties. Jobs are sedentary if walking & standing are required only occasionally and other sedentary criteria are met.

## 2025-05-16 ENCOUNTER — TELEPHONE (OUTPATIENT)
Dept: ORTHOPEDICS | Facility: CLINIC | Age: 50
End: 2025-05-16
Payer: COMMERCIAL

## 2025-05-16 ENCOUNTER — PATIENT MESSAGE (OUTPATIENT)
Dept: ORTHOPEDICS | Facility: CLINIC | Age: 50
End: 2025-05-16
Payer: COMMERCIAL

## 2025-05-16 NOTE — TELEPHONE ENCOUNTER
Spoke c pt. Adjusted pts PO appt to 6/9/25 and confirmed receipt and signing of disability paperwork, faxed to [employer] with confirmation 5/16/25

## 2025-05-28 ENCOUNTER — HOSPITAL ENCOUNTER (OUTPATIENT)
Dept: RADIOLOGY | Facility: OTHER | Age: 50
Discharge: HOME | End: 2025-05-28
Payer: COMMERCIAL

## 2025-05-28 DIAGNOSIS — Z12.31 ENCOUNTER FOR SCREENING MAMMOGRAM FOR MALIGNANT NEOPLASM OF BREAST: ICD-10-CM

## 2025-05-28 PROCEDURE — 77063 BREAST TOMOSYNTHESIS BI: CPT | Mod: 26,,, | Performed by: RADIOLOGY

## 2025-05-28 PROCEDURE — 77067 SCR MAMMO BI INCL CAD: CPT | Mod: 26,,, | Performed by: RADIOLOGY

## 2025-05-28 PROCEDURE — 77067 SCR MAMMO BI INCL CAD: CPT | Mod: TC

## 2025-06-09 ENCOUNTER — OFFICE VISIT (OUTPATIENT)
Dept: ORTHOPEDICS | Facility: CLINIC | Age: 50
End: 2025-06-09
Payer: COMMERCIAL

## 2025-06-09 DIAGNOSIS — Z98.890 S/P ARTHROSCOPY OF RIGHT SHOULDER: ICD-10-CM

## 2025-06-09 DIAGNOSIS — M65.30 TRIGGER FINGER, UNSPECIFIED FINGER, UNSPECIFIED LATERALITY: ICD-10-CM

## 2025-06-09 DIAGNOSIS — Z98.890 POST-OPERATIVE STATE: Primary | ICD-10-CM

## 2025-06-09 PROCEDURE — 1159F MED LIST DOCD IN RCRD: CPT | Mod: CPTII,S$GLB,, | Performed by: SPECIALIST/TECHNOLOGIST

## 2025-06-09 PROCEDURE — 99999 PR PBB SHADOW E&M-EST. PATIENT-LVL III: CPT | Mod: PBBFAC,,, | Performed by: SPECIALIST/TECHNOLOGIST

## 2025-06-09 PROCEDURE — 99024 POSTOP FOLLOW-UP VISIT: CPT | Mod: S$GLB,,, | Performed by: SPECIALIST/TECHNOLOGIST

## 2025-06-09 RX ORDER — MELOXICAM 15 MG/1
15 TABLET ORAL DAILY
Qty: 30 TABLET | Refills: 0 | Status: SHIPPED | OUTPATIENT
Start: 2025-06-09 | End: 2025-07-09

## 2025-06-09 NOTE — PROGRESS NOTES
"6/9/25  Patient reports 6 weeks status post right shoulder arthroscopy and right thumb trigger release.  She reports she is in mild pain.  She has been attending therapy regularly.  She states that she has continues to wear her sling when her shoulder gets achy throughout the day.  She presents today in her sling.  She denies any numbness or tingling.  She states that she has not been as diligent doing her scar massage over her trigger thumb release.    5/13/25  Ms. Alcaraz is here today for a post-operative visit.  She is 15 days status post right shoulder arthroscopy and right thumb trigger thumb release by Dr. Reyes on 04/28/2025. She reports that she is moderate.  She states that she takes her sling is off couple of times a day in his lifting gal of milk that cause soreness within the shoulder.  She also notes sensitivity over the incision site of the trigger thumb.  She states that she works at [employer] doing a lot of heavy lifting pushing and pulling. She denies fever, chills, and sweats since the time of the surgery.     Physical exam:    Vitals:    06/09/25 0944   PainSc:   4   PainLoc: Shoulder     Vital signs are stable, patient is afebrile.  Patient is well dressed and well groomed, no acute distress.  Alert and oriented to person, place, and time.  Incision is clean, dry and intact.  There is no erythema or exudate.  There is no sign of any infection. She is NVI. Able to make a composite fist.  Hypertrophic scar noted over the right trigger thumb release.  Forward flexion of the shoulder to about 160° and a abduction to 150°.  She notes pain at end range.    Assessment:   s/p right shoulder arthroscopy and right trigger thumb release        Plan:  Sara Escamilla" was seen today for post-op evaluation, pain and post-op evaluation.    Diagnoses and all orders for this visit:    Post-operative state  -     meloxicam (MOBIC) 15 MG tablet; Take 1 tablet (15 mg total) by mouth once daily.  -     Ambulatory " Referral/Consult to Occupational Therapy; Future    S/P arthroscopy of right shoulder  -     meloxicam (MOBIC) 15 MG tablet; Take 1 tablet (15 mg total) by mouth once daily.    Trigger finger, unspecified finger, unspecified laterality  -     Ambulatory Referral/Consult to Occupational Therapy; Future      Stressed the patient the importance of physical therapy and performing regular home exercise program   Inform patient that she needs to discontinue use of the sling.  Discussed with the patient concerns for adhesive capsulitis.  Recommend Mobic for pain control  Patient we will follow up in 6 weeks without an x-ray.  Provided patient with work letter.  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 Although a sedentary job is defined as one which involves sitting, a certain amount of walking and standing is often necessary in carrying out job duties. Jobs are sedentary if walking & standing are required only occasionally and other sedentary criteria are met.

## 2025-06-12 ENCOUNTER — PATIENT MESSAGE (OUTPATIENT)
Dept: ORTHOPEDICS | Facility: CLINIC | Age: 50
End: 2025-06-12
Payer: COMMERCIAL

## 2025-06-12 ENCOUNTER — TELEPHONE (OUTPATIENT)
Dept: ORTHOPEDICS | Facility: CLINIC | Age: 50
End: 2025-06-12
Payer: COMMERCIAL

## 2025-06-12 NOTE — TELEPHONE ENCOUNTER
Spoke with pt she stated she will have HR send paperwork for extended leave.pt stated she was suppose to go back to work today 6/12/25 but job will not let her with restrictions.

## 2025-06-18 ENCOUNTER — PATIENT MESSAGE (OUTPATIENT)
Dept: ORTHOPEDICS | Facility: CLINIC | Age: 50
End: 2025-06-18
Payer: COMMERCIAL

## 2025-06-19 ENCOUNTER — TELEPHONE (OUTPATIENT)
Dept: ORTHOPEDICS | Facility: CLINIC | Age: 50
End: 2025-06-19
Payer: COMMERCIAL

## 2025-06-19 NOTE — TELEPHONE ENCOUNTER
Spoke c pt. Advised that [facility] paperwork was submitted ands signed. Patient verbalized understanding and was thankful.

## 2025-07-14 ENCOUNTER — OFFICE VISIT (OUTPATIENT)
Dept: SLEEP MEDICINE | Facility: CLINIC | Age: 50
End: 2025-07-14
Payer: COMMERCIAL

## 2025-07-14 DIAGNOSIS — R06.83 SNORING: ICD-10-CM

## 2025-07-14 DIAGNOSIS — F51.09 OTHER INSOMNIA NOT DUE TO A SUBSTANCE OR KNOWN PHYSIOLOGICAL CONDITION: ICD-10-CM

## 2025-07-14 DIAGNOSIS — R51.9 MORNING HEADACHE: ICD-10-CM

## 2025-07-14 DIAGNOSIS — G47.10 HYPERSOMNOLENCE: Primary | ICD-10-CM

## 2025-07-14 PROCEDURE — 1159F MED LIST DOCD IN RCRD: CPT | Mod: CPTII,95,, | Performed by: PHYSICIAN ASSISTANT

## 2025-07-14 PROCEDURE — 98002 SYNCH AUDIO-VIDEO NEW MOD 45: CPT | Mod: 95,,, | Performed by: PHYSICIAN ASSISTANT

## 2025-07-14 PROCEDURE — 1160F RVW MEDS BY RX/DR IN RCRD: CPT | Mod: CPTII,95,, | Performed by: PHYSICIAN ASSISTANT

## 2025-07-14 NOTE — PROGRESS NOTES
"The chief complaint leading to consultation is: snoring, fatigue     Visit type: audiovisual     The patient location is: [place]     24 minutes of total time spent on the encounter, which includes face to face time and non-face to face time preparing to see the patient (eg, review of tests), Obtaining and/or reviewing separately obtained history, Documenting clinical information in the electronic or other health record, Independently interpreting results (not separately reported) and communicating results to the patient/family/caregiver, or Care coordination (not separately reported).      Each patient to whom he or she provides medical services by telemedicine is:  (1) informed of the relationship between the physician and patient and the respective role of any other health care provider with respect to management of the patient; and (2) notified that he or she may decline to receive medical services by telemedicine and may withdraw from such care at any time.        Referred by Bria Mahoney NP     NEW PATIENT VISIT    Sara Alcaraz  is a pleasant 50 y.o. female  with PMH significant for LINCOLN, BMI 31+ who presents for sleep evaluation following referral from PCP      C/o snoring, snoring arousals, witnessed apneas (once), poor occasionally disrupted but always un-refreshing sleep, and daytime sleepiness and fatigue. Reports issues with snoring and arousals have become really significant in the last year. Denies sleep walking/talking. Denies dream enactment behaviors. Denies sx of RLS, but does endorse occasionally leg cramping typically relieved by drinking water and pickle juice, relates to dehydration. States her PCP recommended evaluation for BRYNN, which is why she presents today.    SLEEP SCHEDULE   Environment Falls asleep to the TV   Bed Time "Not set time"    Sleep Latency Right away-1hr   Arousals 0-1   Nocturia 0-1   Back to sleep Up to 30mins   Wake time 7AM   Naps None    Work        Past " Medical History:   Diagnosis Date    Allergy     Fibroids     Iron deficiency anemia, unspecified     Unspecified chronic bronchitis      Problem List[1]  Current Medications[2]     There were no vitals filed for this visit.    Physical Exam:    GEN:   Well-appearing  Psych:  Appropriate affect, demonstrates insight  SKIN:  No rash on the face or bridge of the nose      LABS:   Lab Results   Component Value Date    HGB 13.3 2025    CO2 27 2025         RECORDS REVIEWED:    No previous sleep study    ASSESSMENT    Cook Sleepiness Scale:  Sitting and readin  Watching TV:    2  Passenger in a car x 1 hr:  1  Sitting quietly after lunch:  3  Lying down to rest in PM:  3  Sitting, inactive in public:  1  Sitting+ talking to someone:  0  Stopped in traffic:   0  Total    10/24    PROBLEM DESCRIPTION/ Sx on Presentation  STATUS   Sx BRYNN   + snoring, + snoring arousals, + witnessed apneas  + wakes feeling un-refreshed  + occasional wakes with headaches  Denies known family hx of BRYNN  New   Daytime Sx   + sleepiness when inactive   ESS 10/24 on intake  New   Insomnia   Trouble falling asleep: right away-1hr  Arousals:         0-1  Hard to get back to sleep?: up to 30mins    Prior pertinent medications:  Current pertinent medications:   New   Nocturia   x 0-1 per sleep period  New   Other issues:       PLAN      -recommend sleep testing   -HST ordered  -discussed trial therapy if BRYNN present and the patient is open to a trial of CPAP therapy  -discussed BRYNN and PAP with patient in detail, including possible complications of untreated BRYNN like heart attack/stroke  -advised on strict driving precautions; advised never to drive drowsy     Advised on plan of care. Answered all patient questions. Patient verbalized understanding and voiced agreement with plan of care.     RTC if dx of BRYNN made and CPAP ordered, will need follow up 31-90 days after receiving machine for compliance       The patient was given  open opportunity to ask questions and/or express concerns about treatment plan. All questions/concerns were discussed.     Two patient identifiers used prior to evaluation.                  [1]   Patient Active Problem List  Diagnosis    Pain of right thumb    Decreased range of motion of right thumb    Varicose veins of bilateral lower extremities with pain    Venous insufficiency    Left leg pain    Viral URI with cough    Pharyngitis    Status post hysteroscopy, dilation and curettage    Right shoulder pain    Chronic bronchitis    Snoring    Class 1 obesity without serious comorbidity with body mass index (BMI) of 31.0 to 31.9 in adult    Elevated BP without diagnosis of hypertension    Trigger finger of right hand   [2]   Current Outpatient Medications:     acetaminophen (TYLENOL) 500 MG tablet, Take 2 tablets (1,000 mg total) by mouth every 8 (eight) hours., Disp: 90 tablet, Rfl: 0    albuterol (PROVENTIL/VENTOLIN HFA) 90 mcg/actuation inhaler, 2 puffs every 4 (four) hours as needed., Disp: , Rfl:     cetirizine (ZYRTEC) 10 MG tablet, Take 10 mg by mouth once daily., Disp: , Rfl:     cyclobenzaprine (FLEXERIL) 5 MG tablet, TAKE 1 TO 2 TABLETS BY MOUTH THREE TIMES DAILY AS NEEDED FOR PAIN, Disp: , Rfl:     EPINEPHrine (AUVI-Q) 0.3 mg/0.3 mL AtIn, Inject 0.3 mLs (0.3 mg total) into the muscle once., Disp: 0.3 mL, Rfl: 3    fluticasone propionate (FLONASE) 50 mcg/actuation nasal spray, 1 spray (50 mcg total) by Each Nostril route 2 (two) times daily as needed for Rhinitis., Disp: 15 g, Rfl: 0    gabapentin (NEURONTIN) 100 MG capsule, Take 1 capsule (100 mg total) by mouth 3 (three) times daily. for 15 days, Disp: 45 capsule, Rfl: 0    ibuprofen (ADVIL,MOTRIN) 600 MG tablet, Take 1 tablet (600 mg total) by mouth every 6 (six) hours as needed for Pain., Disp: 20 tablet, Rfl: 0    ketoconazole (NIZORAL) 2 % cream, SMARTSI Application Topical 1 to 2 Times Daily, Disp: , Rfl:     multivitamin (ONE DAILY  MULTIVITAMIN) per tablet, Take 1 tablet by mouth once daily., Disp: , Rfl:     nystatin (MYCOSTATIN) powder, SMARTSIG:Packet(s) Topical Twice Daily, Disp: , Rfl:     oxyCODONE (ROXICODONE) 5 MG immediate release tablet, Take 1 tablet (5 mg total) by mouth every 6 (six) hours as needed for Pain., Disp: 24 tablet, Rfl: 0    triamcinolone acetonide 0.1% (KENALOG) 0.1 % cream, SMARTSIG:sparingly Topical Twice Daily, Disp: , Rfl:     Current Facility-Administered Medications:     LIDOcaine HCL 10 mg/ml (1%) injection 5 mL, 5 mL, Other, 1 time in Clinic/HOD, Demetrio Vega MD    LIDOcaine-EPINEPHrine 1%-1:100,000 30 mL, LIDOcaine HCL 10 mg/ml (1%) 20 mL, sodium bicarbonate 10 mL in sodium chloride 0.9% 500 mL solution, , MISCELLANEOUS, 1 time in Clinic/HOD, Demetrio Vega MD

## 2025-07-17 ENCOUNTER — OFFICE VISIT (OUTPATIENT)
Dept: ORTHOPEDICS | Facility: CLINIC | Age: 50
End: 2025-07-17
Payer: COMMERCIAL

## 2025-07-17 VITALS — WEIGHT: 183.63 LBS | BODY MASS INDEX: 31.51 KG/M2

## 2025-07-17 DIAGNOSIS — Z98.890 S/P TRIGGER FINGER RELEASE: ICD-10-CM

## 2025-07-17 DIAGNOSIS — Z98.890 S/P ARTHROSCOPY OF RIGHT SHOULDER: Primary | ICD-10-CM

## 2025-07-17 PROCEDURE — 99999 PR PBB SHADOW E&M-EST. PATIENT-LVL III: CPT | Mod: PBBFAC,,, | Performed by: SPECIALIST/TECHNOLOGIST

## 2025-07-17 NOTE — PROGRESS NOTES
7/17/25  Patient reports 12 weeks status post right shoulder arthroscopy and right thumb trigger finger release.  She reports she is in minimal pain.  She states that she has been attending therapy regularly with minimal discomfort.  She states that on occasion her thumb we will cause her discomfort over the incision site.  She states that she has been performing regular scar massage.  She states she has been performing her home exercise program.  She has reached full range of motion as well as equal strength.  She denies any numbness or tingling.    6/9/25  Patient reports 6 weeks status post right shoulder arthroscopy and right thumb trigger release.  She reports she is in mild pain.  She has been attending therapy regularly.  She states that she has continues to wear her sling when her shoulder gets achy throughout the day.  She presents today in her sling.  She denies any numbness or tingling.  She states that she has not been as diligent doing her scar massage over her trigger thumb release.    5/13/25  Ms. Alcaraz is here today for a post-operative visit.  She is 15 days status post right shoulder arthroscopy and right thumb trigger thumb release by Dr. Reyes on 04/28/2025. She reports that she is moderate.  She states that she takes her sling is off couple of times a day in his lifting gal of milk that cause soreness within the shoulder.  She also notes sensitivity over the incision site of the trigger thumb.  She states that she works at [employer] doing a lot of heavy lifting pushing and pulling. She denies fever, chills, and sweats since the time of the surgery.     Physical exam:    Vitals:    07/17/25 1139   Weight: 83.3 kg (183 lb 9.6 oz)   PainSc:   5   PainLoc: Shoulder       Vital signs are stable, patient is afebrile.  Patient is well dressed and well groomed, no acute distress.  Alert and oriented to person, place, and time.  Incision is clean, dry and intact.  There is no erythema or exudate.   "There is no sign of any infection. She is NVI. Able to make a composite fist.  Hypertrophic scar noted over the right trigger thumb release.  Forward flexion of the shoulder to about 170 and a abduction to 170°.  She minor pain at end range.    Assessment:   s/p right shoulder arthroscopy and right trigger thumb release        Plan:  Sara Escamilla" was seen today for post-op evaluation and pain.    Diagnoses and all orders for this visit:    S/P arthroscopy of right shoulder    S/P trigger finger release        Patient doing well status post therapy and home exercise program   Advised patient continue with home exercise program   Patient is cleared to return to full duty without restrictions   Patient we will follow up in clinic p.r.n.  "

## 2025-07-21 ENCOUNTER — TELEPHONE (OUTPATIENT)
Dept: SLEEP MEDICINE | Facility: OTHER | Age: 50
End: 2025-07-21
Payer: COMMERCIAL

## (undated) DEVICE — DRAPE SHOULDER BEACH CHAIR

## (undated) DEVICE — DRAPE STERI U-SHAPED 47X51IN

## (undated) DEVICE — WRAP SHLDR HIP ACCU THRM PACK

## (undated) DEVICE — GLOVE BIOGEL ECLIPSE SZ 7

## (undated) DEVICE — COVER CAMERA OPERATING ROOM

## (undated) DEVICE — SET BASIN 48X48IN 6000ML RING

## (undated) DEVICE — SPONGE COTTON TRAY 4X4IN

## (undated) DEVICE — PAD ABDOMINAL STERILE 8X10IN

## (undated) DEVICE — GOWN ECLIPSE REINF LVL4 TWL XL

## (undated) DEVICE — SUT 4/0 18IN ETHILON BL P3

## (undated) DEVICE — GLOVE BIOGEL PI MICRO INDIC 7

## (undated) DEVICE — DRAPE STERI-DRAPE 1000 17X11IN

## (undated) DEVICE — KIT TRIMANO CHIN

## (undated) DEVICE — SOL IRR SOD CHL .9% POUR

## (undated) DEVICE — SEAL LENS SCOPE MYOSURE

## (undated) DEVICE — ELECTRODE REM PLYHSV RETURN 9

## (undated) DEVICE — GLOVE BIOGEL SKINSENSE PI 6.5

## (undated) DEVICE — BLADE SURG #15 CARBON STEEL

## (undated) DEVICE — SLING ARM MEDIUM FOAM STRAP

## (undated) DEVICE — Device

## (undated) DEVICE — JELLY SURGILUBE 5GR

## (undated) DEVICE — SUT PROLENE 3-0 FS-1 MONO18

## (undated) DEVICE — PACK FLUENT DISPOSABLE

## (undated) DEVICE — DRAPE INCISE IOBAN 2 23X17IN

## (undated) DEVICE — TOWEL OR DISP STRL BLUE 4/PK

## (undated) DEVICE — SOL NACL IRR 3000ML

## (undated) DEVICE — BANDAGE MATRIX HK LOOP 2IN 5YD

## (undated) DEVICE — SOL POVIDONE SCRUB IODINE 4 OZ

## (undated) DEVICE — GLOVE BIOGEL SKINSENSE PI 6.0

## (undated) DEVICE — BANDAGE BULKEE II 2.25INX3YD

## (undated) DEVICE — TOURNIQUET SB QC DP 18X4IN

## (undated) DEVICE — CATH FOLEY SIL 2-WAY 12FR 5CC

## (undated) DEVICE — PAD DERMAPROX THCK 11X15X1IN

## (undated) DEVICE — DRESSING N ADH OIL EMUL 3X3

## (undated) DEVICE — SOL POVIDONE PREP IODINE 4 OZ

## (undated) DEVICE — DRAPE THREE-QTR REINF 53X77IN

## (undated) DEVICE — KIT TRIMANO